# Patient Record
Sex: FEMALE | HISPANIC OR LATINO | Employment: UNEMPLOYED | URBAN - METROPOLITAN AREA
[De-identification: names, ages, dates, MRNs, and addresses within clinical notes are randomized per-mention and may not be internally consistent; named-entity substitution may affect disease eponyms.]

---

## 2017-11-20 ENCOUNTER — GENERIC CONVERSION - ENCOUNTER (OUTPATIENT)
Dept: OTHER | Facility: OTHER | Age: 22
End: 2017-11-20

## 2017-11-26 ENCOUNTER — HOSPITAL ENCOUNTER (EMERGENCY)
Facility: HOSPITAL | Age: 22
Discharge: HOME | End: 2017-11-26
Attending: EMERGENCY MEDICINE | Admitting: EMERGENCY MEDICINE
Payer: COMMERCIAL

## 2017-11-26 VITALS
RESPIRATION RATE: 25 BRPM | HEART RATE: 78 BPM | DIASTOLIC BLOOD PRESSURE: 81 MMHG | SYSTOLIC BLOOD PRESSURE: 117 MMHG | WEIGHT: 162.2 LBS | TEMPERATURE: 98.6 F | OXYGEN SATURATION: 100 %

## 2017-11-26 DIAGNOSIS — Z3A.11 11 WEEKS GESTATION OF PREGNANCY: ICD-10-CM

## 2017-11-26 DIAGNOSIS — O21.9 NAUSEA AND VOMITING IN PREGNANCY: Primary | ICD-10-CM

## 2017-11-26 LAB
ABO GROUP BLD: NORMAL
ALBUMIN SERPL BCP-MCNC: 3.8 G/DL (ref 3.5–5)
ALP SERPL-CCNC: 50 U/L (ref 46–116)
ALT SERPL W P-5'-P-CCNC: 16 U/L (ref 12–78)
ANION GAP SERPL CALCULATED.3IONS-SCNC: 11 MMOL/L (ref 4–13)
AST SERPL W P-5'-P-CCNC: 13 U/L (ref 5–45)
ATRIAL RATE: 74 BPM
B-HCG SERPL-ACNC: ABNORMAL MIU/ML
BASOPHILS # BLD AUTO: 0.01 THOUSANDS/ΜL (ref 0–0.1)
BASOPHILS NFR BLD AUTO: 0 % (ref 0–1)
BILIRUB DIRECT SERPL-MCNC: 0.08 MG/DL (ref 0–0.2)
BILIRUB SERPL-MCNC: 0.32 MG/DL (ref 0.2–1)
BILIRUB UR QL STRIP: NEGATIVE
BLD GP AB SCN SERPL QL: NEGATIVE
BUN SERPL-MCNC: 6 MG/DL (ref 5–25)
CALCIUM SERPL-MCNC: 9.2 MG/DL (ref 8.3–10.1)
CHLORIDE SERPL-SCNC: 103 MMOL/L (ref 100–108)
CLARITY UR: CLEAR
CO2 SERPL-SCNC: 24 MMOL/L (ref 21–32)
COLOR UR: YELLOW
COLOR, POC: YELLOW
CREAT SERPL-MCNC: 0.37 MG/DL (ref 0.6–1.3)
EOSINOPHIL # BLD AUTO: 0.1 THOUSAND/ΜL (ref 0–0.61)
EOSINOPHIL NFR BLD AUTO: 1 % (ref 0–6)
ERYTHROCYTE [DISTWIDTH] IN BLOOD BY AUTOMATED COUNT: 19.7 % (ref 11.6–15.1)
EXT PREG TEST URINE: POSITIVE
GFR SERPL CREATININE-BSD FRML MDRD: 150 ML/MIN/1.73SQ M
GLUCOSE SERPL-MCNC: 81 MG/DL (ref 65–140)
GLUCOSE UR STRIP-MCNC: NEGATIVE MG/DL
HCT VFR BLD AUTO: 29.7 % (ref 34.8–46.1)
HGB BLD-MCNC: 8.9 G/DL (ref 11.5–15.4)
HGB UR QL STRIP.AUTO: NEGATIVE
KETONES UR STRIP-MCNC: ABNORMAL MG/DL
LEUKOCYTE ESTERASE UR QL STRIP: NEGATIVE
LIPASE SERPL-CCNC: 75 U/L (ref 73–393)
LYMPHOCYTES # BLD AUTO: 1.97 THOUSANDS/ΜL (ref 0.6–4.47)
LYMPHOCYTES NFR BLD AUTO: 24 % (ref 14–44)
MCH RBC QN AUTO: 19.8 PG (ref 26.8–34.3)
MCHC RBC AUTO-ENTMCNC: 30 G/DL (ref 31.4–37.4)
MCV RBC AUTO: 66 FL (ref 82–98)
MONOCYTES # BLD AUTO: 0.59 THOUSAND/ΜL (ref 0.17–1.22)
MONOCYTES NFR BLD AUTO: 7 % (ref 4–12)
NEUTROPHILS # BLD AUTO: 5.52 THOUSANDS/ΜL (ref 1.85–7.62)
NEUTS SEG NFR BLD AUTO: 68 % (ref 43–75)
NITRITE UR QL STRIP: NEGATIVE
NRBC BLD AUTO-RTO: 0 /100 WBCS
P AXIS: 38 DEGREES
PH UR STRIP.AUTO: 7 [PH] (ref 4.5–8)
PLATELET # BLD AUTO: 269 THOUSANDS/UL (ref 149–390)
POTASSIUM SERPL-SCNC: 3.4 MMOL/L (ref 3.5–5.3)
PR INTERVAL: 160 MS
PROT SERPL-MCNC: 7.7 G/DL (ref 6.4–8.2)
PROT UR STRIP-MCNC: NEGATIVE MG/DL
QRS AXIS: 60 DEGREES
QRSD INTERVAL: 90 MS
QT INTERVAL: 380 MS
QTC INTERVAL: 421 MS
RBC # BLD AUTO: 4.5 MILLION/UL (ref 3.81–5.12)
RH BLD: POSITIVE
SODIUM SERPL-SCNC: 138 MMOL/L (ref 136–145)
SP GR UR STRIP.AUTO: 1.02 (ref 1–1.03)
SPECIMEN EXPIRATION DATE: NORMAL
T WAVE AXIS: 43 DEGREES
UROBILINOGEN UR QL STRIP.AUTO: 0.2 E.U./DL
VENTRICULAR RATE: 74 BPM
WBC # BLD AUTO: 8.19 THOUSAND/UL (ref 4.31–10.16)

## 2017-11-26 PROCEDURE — 96361 HYDRATE IV INFUSION ADD-ON: CPT

## 2017-11-26 PROCEDURE — 85025 COMPLETE CBC W/AUTO DIFF WBC: CPT | Performed by: FAMILY MEDICINE

## 2017-11-26 PROCEDURE — 81003 URINALYSIS AUTO W/O SCOPE: CPT

## 2017-11-26 PROCEDURE — 99284 EMERGENCY DEPT VISIT MOD MDM: CPT

## 2017-11-26 PROCEDURE — 96374 THER/PROPH/DIAG INJ IV PUSH: CPT

## 2017-11-26 PROCEDURE — 86850 RBC ANTIBODY SCREEN: CPT | Performed by: FAMILY MEDICINE

## 2017-11-26 PROCEDURE — 81025 URINE PREGNANCY TEST: CPT | Performed by: EMERGENCY MEDICINE

## 2017-11-26 PROCEDURE — 93005 ELECTROCARDIOGRAM TRACING: CPT | Performed by: FAMILY MEDICINE

## 2017-11-26 PROCEDURE — 86901 BLOOD TYPING SEROLOGIC RH(D): CPT | Performed by: FAMILY MEDICINE

## 2017-11-26 PROCEDURE — 36415 COLL VENOUS BLD VENIPUNCTURE: CPT | Performed by: FAMILY MEDICINE

## 2017-11-26 PROCEDURE — 83690 ASSAY OF LIPASE: CPT | Performed by: FAMILY MEDICINE

## 2017-11-26 PROCEDURE — 81002 URINALYSIS NONAUTO W/O SCOPE: CPT | Performed by: EMERGENCY MEDICINE

## 2017-11-26 PROCEDURE — 84702 CHORIONIC GONADOTROPIN TEST: CPT | Performed by: FAMILY MEDICINE

## 2017-11-26 PROCEDURE — 86900 BLOOD TYPING SEROLOGIC ABO: CPT | Performed by: FAMILY MEDICINE

## 2017-11-26 PROCEDURE — 80076 HEPATIC FUNCTION PANEL: CPT | Performed by: FAMILY MEDICINE

## 2017-11-26 PROCEDURE — 80048 BASIC METABOLIC PNL TOTAL CA: CPT | Performed by: FAMILY MEDICINE

## 2017-11-26 RX ORDER — ONDANSETRON 2 MG/ML
4 INJECTION INTRAMUSCULAR; INTRAVENOUS ONCE
Status: COMPLETED | OUTPATIENT
Start: 2017-11-26 | End: 2017-11-26

## 2017-11-26 RX ORDER — ONDANSETRON 4 MG/1
4 TABLET, FILM COATED ORAL EVERY 8 HOURS PRN
Qty: 10 TABLET | Refills: 0 | Status: ON HOLD | OUTPATIENT
Start: 2017-11-26 | End: 2018-06-11

## 2017-11-26 RX ORDER — ACETAMINOPHEN 500 MG
1000 TABLET ORAL EVERY 6 HOURS PRN
Status: ON HOLD | COMMUNITY
End: 2018-06-11

## 2017-11-26 RX ORDER — FAMOTIDINE 20 MG
TABLET ORAL
Qty: 30 EACH | Refills: 0 | Status: SHIPPED | OUTPATIENT
Start: 2017-11-26 | End: 2017-11-28

## 2017-11-26 RX ADMIN — SODIUM CHLORIDE 1000 ML: 0.9 INJECTION, SOLUTION INTRAVENOUS at 14:28

## 2017-11-26 RX ADMIN — ONDANSETRON 4 MG: 2 INJECTION INTRAMUSCULAR; INTRAVENOUS at 14:35

## 2017-11-26 NOTE — ED ATTENDING ATTESTATION
Yael Lares MD, saw and evaluated the patient  I have discussed the patient with the resident/non-physician practitioner and agree with the resident's/non-physician practitioner's findings, Plan of Care, and MDM as documented in the resident's/non-physician practitioner's note, except where noted  All available labs and Radiology studies were reviewed  At this point I agree with the current assessment done in the Emergency Department  I have conducted an independent evaluation of this patient a history and physical is as follows:  Patient is 11 weeks pregnant per LMP, , comes with c/o nausea, vomiting, dizziness, going on for several days; denies abdominal pain, vaginal bleeding  PSH:   On exam, no acute distress, VSS, Abd soft, NTNDM [place] CTA, CVA RRR, Neuro intact, PERRL, no CN or focal neuro deficit  We will check labs, to r/o Dehydration, Anemia, check cbc, cmp, lipase, beta HCG  Update:  Labs within normal limits, bedside ultrasound done, IUP confirmed, fetal heart rate documented  Patient stable for discharge, outpatient follow-up with OBGYN      Critical Care Time  CritCare Time

## 2017-11-26 NOTE — ED PROVIDER NOTES
History  Chief Complaint   Patient presents with    Dizziness     pt reports feeling dizzy every morning since she found out she was pregnant on wednesday    LMP 9/10  denies abdominal pain, bleeding or discharge  nausea without vomiting  22 yo F came in with complaints of dizziness and lightheadedness for last 3 weeks when she found out that she was pregnant  Her last LMP was on Sept 10 and she has not visited her OB yet  She says the nausea, dizziness, lightheadedness so bad that she could not even get up today  She also has vomiting with nausea,about 2 episodes daily, nonbilious, no blood  She denies any cough, congestion, fever, ear ache pain abdomen, urinary complaints, constipation, diarrhea, sexually transmitted ds, PID  She is  and has had 2 term deliveries via C section and had to be transfused blood in last pregnancy after C section  She denies smoking, alcohol and last used pot 3 weeks ago        Dizziness   Associated symptoms: nausea and vomiting    Associated symptoms: no blood in stool, no chest pain, no diarrhea, no headaches, no palpitations and no shortness of breath        Prior to Admission Medications   Prescriptions Last Dose Informant Patient Reported? Taking?   acetaminophen (TYLENOL) 500 mg tablet   Yes Yes   Sig: Take 1,000 mg by mouth every 6 (six) hours as needed for mild pain   albuterol (2 5 mg/3 mL) 0 083 % nebulizer solution 2017 at Unknown time  No Yes   Sig: Take 3 mL by nebulization every 6 (six) hours as needed for wheezing      Facility-Administered Medications: None       Past Medical History:   Diagnosis Date    Asthma        Past Surgical History:   Procedure Laterality Date     SECTION         History reviewed  No pertinent family history  I have reviewed and agree with the history as documented      Social History   Substance Use Topics    Smoking status: Never Smoker    Smokeless tobacco: Never Used    Alcohol use No        Review of Systems   Constitutional: Positive for activity change and appetite change  Negative for fatigue, fever and unexpected weight change  HENT: Negative for congestion, sneezing, sore throat, trouble swallowing and voice change  Respiratory: Negative for choking, chest tightness, shortness of breath and wheezing  Cardiovascular: Negative for chest pain, palpitations and leg swelling  Gastrointestinal: Positive for nausea and vomiting  Negative for abdominal distention, abdominal pain, blood in stool, constipation and diarrhea  Genitourinary: Negative for difficulty urinating, vaginal bleeding and vaginal discharge  Neurological: Positive for dizziness and light-headedness  Negative for headaches  Physical Exam  ED Triage Vitals   Temperature Pulse Respirations Blood Pressure SpO2   11/26/17 1217 11/26/17 1217 11/26/17 1217 11/26/17 1217 11/26/17 1217   98 6 °F (37 °C) 82 16 138/68 100 %      Temp Source Heart Rate Source Patient Position - Orthostatic VS BP Location FiO2 (%)   11/26/17 1217 11/26/17 1441 11/26/17 1441 11/26/17 1441 --   Temporal Monitor Lying Right arm       Pain Score       11/26/17 1217       No Pain           Orthostatic Vital Signs  Vitals:    11/26/17 1217 11/26/17 1441 11/26/17 1606   BP: 138/68 152/80 117/81   Pulse: 82 65 78   Patient Position - Orthostatic VS:  Lying Lying       Physical Exam   Constitutional: She is oriented to person, place, and time  She appears well-developed and well-nourished  HENT:   Head: Normocephalic and atraumatic  Lips and tongue dry   Eyes: Conjunctivae and EOM are normal    Neck: Normal range of motion  Cardiovascular: Normal rate, regular rhythm, normal heart sounds and intact distal pulses  Pulmonary/Chest: Effort normal and breath sounds normal    Abdominal: Soft  Neurological: She is alert and oriented to person, place, and time  Skin: Skin is warm and dry         ED Medications  Medications   sodium chloride 0 9 % bolus 1,000 mL (0 mL Intravenous Stopped 11/26/17 1600)   ondansetron (ZOFRAN) injection 4 mg (4 mg Intravenous Given 11/26/17 1435)       Diagnostic Studies  Results Reviewed     Procedure Component Value Units Date/Time    Hepatic function panel [[id number]]  (Normal) Collected:  11/26/17 1427    Lab Status:  Final result Specimen:  Blood from Arm, Left Updated:  11/26/17 1521     Total Bilirubin 0 32 mg/dL      Bilirubin, Direct 0 08 mg/dL      Alkaline Phosphatase 50 U/L      AST 13 U/L      ALT 16 U/L      Total Protein 7 7 g/dL      Albumin 3 8 g/dL     Lipase [[id number]]  (Normal) Collected:  11/26/17 1427    Lab Status:  Final result Specimen:  Blood from Arm, Left Updated:  11/26/17 1521     Lipase 75 u/L     hCG, quantitative [[id number]]  (Abnormal) Collected:  11/26/17 1427    Lab Status:  Final result Specimen:  Blood from Arm, Left Updated:  11/26/17 1521     HCG, Quant 111,165 9 (H) mIU/mL     Narrative:          Expected Ranges:     Approximate               Approximate HCG  Gestation age          Concentration ( mIU/mL)  _____________          ______________________   Aundra Dally                      HCG values  0 2-1                       5-50  1-2                           2-3                         100-5000  3-4                         500-72377  4-5                         1000-32222  5-6                         [id number]  6-8                         87260-169298  8-12                        [id number]    Basic metabolic panel [[id number]]  (Abnormal) Collected:  11/26/17 1427    Lab Status:  Final result Specimen:  Blood from Arm, Left Updated:  11/26/17 1454     Sodium 138 mmol/L      Potassium 3 4 (L) mmol/L      Chloride 103 mmol/L      CO2 24 mmol/L      Anion Gap 11 mmol/L      BUN 6 mg/dL      Creatinine 0 37 (L) mg/dL      Glucose 81 mg/dL      Calcium 9 2 mg/dL      eGFR 150 ml/min/1 73sq m     Narrative:         National Kidney Disease Education Program recommendations are as follows:  GFR calculation is accurate only with a steady state creatinine  Chronic Kidney disease less than 60 ml/min/1 73 sq  meters  Kidney failure less than 15 ml/min/1 73 sq  meters      CBC and differential [[id number]]  (Abnormal) Collected:  11/26/17 1427    Lab Status:  Final result Specimen:  Blood from Arm, Left Updated:  11/26/17 1434     WBC 8 19 Thousand/uL      RBC 4 50 Million/uL      Hemoglobin 8 9 (L) g/dL      Hematocrit 29 7 (L) %      MCV 66 (L) fL      MCH 19 8 (L) pg      MCHC 30 0 (L) g/dL      RDW 19 7 (H) %      Platelets 007 Thousands/uL      nRBC 0 /100 WBCs      Neutrophils Relative 68 %      Lymphocytes Relative 24 %      Monocytes Relative 7 %      Eosinophils Relative 1 %      Basophils Relative 0 %      Neutrophils Absolute 5 52 Thousands/µL      Lymphocytes Absolute 1 97 Thousands/µL      Monocytes Absolute 0 59 Thousand/µL      Eosinophils Absolute 0 10 Thousand/µL      Basophils Absolute 0 01 Thousands/µL     POCT urinalysis dipstick [[id number]]  (Normal) Resulted:  11/26/17 1255    Lab Status:  Final result Updated:  11/26/17 1256     Color, UA yellow    POCT pregnancy, urine [[id number]]  (Normal) Resulted:  11/26/17 1255    Lab Status:  Final result Updated:  11/26/17 1255     EXT PREG TEST UR (Ref: Negative) positive    ED Urine Macroscopic [[id number]]  (Abnormal) Collected:  11/26/17 1310    Lab Status:  Final result Specimen:  Urine Updated:  11/26/17 1254     Color, UA Yellow     Clarity, UA Clear     pH, UA 7 0     Leukocytes, UA Negative     Nitrite, UA Negative     Protein, UA Negative mg/dl      Glucose, UA Negative mg/dl      Ketones, UA 15 (1+) (A) mg/dl      Urobilinogen, UA 0 2 E U /dl      Bilirubin, UA Negative     Blood, UA Negative     Specific Gravity, UA 1 020    Narrative:       CLINITEK RESULT                 No orders to display         Procedures  ECG 12 Lead Documentation  Date/Time: 11/26/2017 2:41 PM  Performed by: Nathanael Escalante  Authorized by: Mirna Rojo     ECG reviewed by me, the ED Provider: yes    Patient location:  ED  Previous ECG:     Previous ECG:  Compared to current  Rhythm:     Rhythm: sinus rhythm    Ectopy:     Ectopy: PVCs      PVCs:  Infrequent  Conduction:     Conduction: normal    ST segments:     ST segments:  Normal  Pelvic Ultrasound  Performed by: Ciarra Feliciano by: Lolly Lugo     Procedure date/time:  11/26/2017 2:42 PM  Patient location:  Bedside and ED  Procedure details:     Indications: evaluate for IUP      Assess:  Intrauterine pregnancy    Technique:  Transabdominal obstetric (limited) exam  Uterine findings:     Intrauterine pregnancy: identified      Single gestation: identified      Fetal heart rate: identified      Fetal heart rate (bpm):  160          Phone Consults  ED Phone Contact    ED Course  ED Course                                MDM  Number of Diagnoses or Management Options  11 weeks gestation of pregnancy:   Nausea and vomiting in pregnancy:     CritCare Time    Disposition  Final diagnoses:   11 weeks gestation of pregnancy   Nausea and vomiting in pregnancy     Time reflects when diagnosis was documented in both MDM as applicable and the Disposition within this note     Time User Action Codes Description Comment    11/26/2017  4:00 PM Marleny Oddi Add [Z3A 11] 11 weeks gestation of pregnancy     11/26/2017  4:00 PM Marleny Oddi Add [O21 9] Nausea and vomiting in pregnancy     11/26/2017  4:00 PM Marleny Oddi Modify [Z3A 11] 11 weeks gestation of pregnancy     11/26/2017  4:00 PM Marleny Oddi Modify [O21 9] Nausea and vomiting in pregnancy     11/26/2017  4:00 PM Marleny Oddi Modify [Z3A 11] 11 weeks gestation of pregnancy     11/26/2017  4:00 PM Marleny Oddi Modify [O21 9] Nausea and vomiting in pregnancy       ED Disposition     ED Disposition Condition Comment    Discharge  [address] discharge to home/self care      Condition at discharge: Good        Follow-up Information     Follow up With Specialties Details Why [address] [facility] [address] [facility] OB/GYN Obstetrics and Gynecology Schedule an appointment as soon as possible for a visit in 1 week  [address] [facility] [address] [phone]        Patient's Medications   Discharge Prescriptions    ONDANSETRON (ZOFRAN) 4 MG TABLET    Take 1 tablet by mouth every 8 (eight) hours as needed for nausea or vomiting       Start Date: 11/26/2017End Date: --       Order Dose: 4 mg       Quantity: 10 tablet    Refills: 0    PRENATAL VIT-FE FUMARATE-FA (PRENATAL COMPLETE) 14-0 4 MG TABS    Take once a day       Start Date: 11/26/2017End Date: --       Order Dose: --       Quantity: 30 each    Refills: 0     No discharge procedures on file  ED Provider  Attending physically available and evaluated Josseline Guzman I managed the patient along with the ED Attending      Electronically Signed by         Diana Rowland MD  Resident  11/26/17 8738

## 2017-11-26 NOTE — DISCHARGE INSTRUCTIONS
Nausea and Vomiting in Pregnancy   AMBULATORY CARE:   Nausea and vomiting in pregnancy  can happen any time of day  These symptoms usually start before the 9th week of pregnancy, and end by the 14th week (second trimester)  Some women can have nausea and vomiting for a longer time  These symptoms can affect some women throughout the entire pregnancy  Nausea and vomiting do not harm your baby  These symptoms can make it hard for you to do your daily activities  Seek care immediately if:   · You have signs of dehydration  Examples are dark yellow urine, dry mouth and lips, dry skin, fast heartbeat, and urinating less than usual     · You have severe abdominal pain  · You feel too weak or dizzy to stand up  · You see blood in your vomit or bowel movements  Contact your healthcare provider if:   · You vomit more than 4 times in 1 day  · You have not been able to keep liquids down for more than 1 day  · You lose more than 2 pounds  · You have a fever  · Your nausea and vomiting continue longer than 14 weeks  · You have questions or concerns about your condition or care  Treatment  for nausea and vomiting in pregnancy is usually not needed  You can make changes in the foods you eat and in your activities to help manage your symptoms  You may need to try several things to learn what works for you  Talk to your healthcare provider if your symptoms do not decrease with the changes suggested below  You may need vitamin B6 and medicine if these changes do not help, or your symptoms become severe  Nutrition changes you can make to manage nausea and vomiting:   · Eat small meals throughout the day instead of 3 large meals  You may be more likely to have nausea and vomiting when your stomach is empty  Eat foods that are low in fat and high in protein  Examples are lean meat, beans, turkey, and chicken without the skin   Eat a small snack, such as crackers, dry cereal, or a small sandwich before you go to bed  · Eat some crackers or dry toast before you get out of bed in the morning  Get out of bed slowly  Sudden movements could cause you to get dizzy and nauseated  · Eat bland foods when you feel nauseated  Examples of bland foods include dry toast, dry cereal, plain pasta, white rice, and bread  Other bland foods include saltine crackers, bananas, gelatin, and pretzels  Avoid spicy, greasy, and fried foods  Avoid any other foods that make you feel nauseated  · Drink liquids that contain ginger  Drink ginger ale made with real ginger or ginger tea made with fresh grated ginger  Ginger capsules or ginger candies may also help to decrease nausea and vomiting  · Drink liquids between meals instead of with meals  Wait at least 30 minutes after you eat to drink liquids  Drink small amounts of liquids often throughout the day to prevent dehydration  Ask how much liquid you should drink each day  Other changes you can make to manage nausea and vomiting:   · Avoid smells that bother you  Strong odors may cause nausea and vomiting to start, or make it worse  Take a short walk, turn on a fan, or try to sleep with the window open to get fresh air  When you are cooking, open windows to get rid of smells that may cause nausea  · Do not brush your teeth right after you eat  if it makes you nauseated  · Rest when you need to  Start activity slowly and work up to your usual routine as you start to feel better  · Talk to your healthcare provider about your prenatal vitamins  Prenatal vitamins can cause nausea for some women  Try taking your prenatal vitamin at night or with a snack  If this change does not help, your healthcare provider may recommend a different type of vitamin  · Do not use any medicines, vitamins, or supplements to manage your symptoms without asking your healthcare provider  Many medicines can harm an unborn baby       · Light to moderate exercise  may help to decrease your symptoms  It may also help you to sleep better at night  Ask your healthcare provider about the best exercise plan for you  Follow up with your healthcare provider as directed:  Write down your questions so you remember to ask them during your visits  © 2017 [facility] Information is for End User's use only and may not be sold, redistributed or otherwise used for commercial purposes  All illustrations and images included in CareNotes® are the copyrighted property of A D A M [employer]  or Jesse Turner  The above information is an  only  It is not intended as medical advice for individual conditions or treatments  Talk to your doctor, nurse or pharmacist before following any medical regimen to see if it is safe and effective for you  Pregnancy at 11 to 14 Weeks   AMBULATORY CARE:   What changes are happening to your body: You are now at the end of your first trimester and entering your second trimester  Morning sickness usually goes away by this time  You may have other symptoms such as fatigue, frequent urination, and headaches  You may have gained between 2 to 4 pounds by now  Seek care immediately if:   · You have pain or cramping in your abdomen or low back  · You have heavy vaginal bleeding or clotting  · You pass material that looks like tissue or large clots  Collect the material and bring it with you  Contact your healthcare provider if:   · You cannot keep food or drinks down, and you are losing weight  · You have light bleeding  · You have chills or a fever  · You have vaginal itching, burning, or pain  · You have yellow, green, white, or foul-smelling vaginal discharge  · You have pain or burning when you urinate, less urine than usual, or pink or bloody urine  · You have questions or concerns about your condition or care    How to care for yourself at this stage of your pregnancy:   · Get plenty of rest   You may feel more tired than normal  You may need to take naps or go to bed earlier  · Manage nausea and vomiting  Avoid fatty and spicy foods  Eat small meals throughout the day instead of large meals  Mel may help to decrease nausea  Ask your healthcare provider about other ways of decreasing nausea and vomiting  · Eat a variety of healthy foods  Healthy foods include fruits, vegetables, whole-grain breads, low-fat dairy foods, beans, lean meats, and fish  Drink liquids as directed  Ask how much liquid to drink each day and which liquids are best for you  Limit caffeine to less than 200 milligrams each day  Limit your intake of fish to 2 servings each week  Choose fish low in mercury such as canned light tuna, shrimp, salmon, cod, or tilapia  Do not  eat fish high in mercury such as swordfish, tilefish, cornelio mackerel, and shark  · Take prenatal vitamins as directed  Your need for certain vitamins and minerals, such as folic acid, increases during pregnancy  Prenatal vitamins provide some of the extra vitamins and minerals you need  Prenatal vitamins may also help to decrease the risk of certain birth defects  · Do not smoke  If you smoke, it is never too late to quit  Smoking increases your risk of a miscarriage and other health problems during your pregnancy  Smoking can cause your baby to be born too early or weigh less at birth  Ask your healthcare provider for information if you need help quitting  · Do not drink alcohol  Alcohol passes from your body to your baby through the placenta  It can affect your baby's brain development and cause fetal alcohol syndrome (FAS)  FAS is a group of conditions that causes mental, behavior, and growth problems  · Talk to your healthcare provider before you take any medicines  Many medicines may harm your baby if you take them when you are pregnant  Do not take any medicines, vitamins, herbs, or supplements without first talking to your healthcare provider   Never use illegal or street drugs (such as marijuana or cocaine) while you are pregnant  Safety tips during pregnancy:   · Avoid hot tubs and saunas  Do not use a hot tub or sauna while you are pregnant, especially during your first trimester  Hot tubs and saunas may raise your baby's temperature and increase the risk of birth defects  · Avoid toxoplasmosis  This is an infection caused by eating raw meat or being around infected cat feces  It can cause birth defects, miscarriages, and other problems  Wash your hands after you touch raw meat  Make sure any meat is well-cooked before you eat it  Avoid raw eggs and unpasteurized milk  Use gloves or ask someone else to clean your cat's litter box while you are pregnant  Changes that are happening with your baby: Your baby has fully formed fingernails and toenails  Your baby's heartbeat can now be heard  Ask your healthcare provider if you can listen to your baby's heartbeat  By week 14, your baby is over 4 inches long from the top of the head to the rump (baby's bottom)  Your baby weighs over 3 ounces  What you need to know about prenatal care:  During the first 28 weeks of your pregnancy, you will see your healthcare provider once a month  Prenatal care can help prevent problems during pregnancy and childbirth  Your healthcare provider will check your blood pressure and weight  You may also need any of the following:  · A urine test  may also be done to check for sugar and protein  These can be signs of gestational diabetes or infection  · Genetic disorders screening tests  may be offered to you  This screening test checks your baby's risk of genetic disorders such as Down syndrome  The screening test includes a blood test and ultrasound  · Your baby's heart rate  will be checked  © 2017 [address] Information is for End User's use only and may not be sold, redistributed or otherwise used for commercial purposes   All illustrations and images included in [employer] are the copyrighted property of A D A M , Inc  or Jesse Turner  The above information is an  only  It is not intended as medical advice for individual conditions or treatments  Talk to your doctor, nurse or pharmacist before following any medical regimen to see if it is safe and effective for you

## 2017-11-28 ENCOUNTER — HOSPITAL ENCOUNTER (EMERGENCY)
Facility: HOSPITAL | Age: 22
Discharge: HOME | End: 2017-11-28
Attending: EMERGENCY MEDICINE | Admitting: EMERGENCY MEDICINE
Payer: COMMERCIAL

## 2017-11-28 VITALS
RESPIRATION RATE: 16 BRPM | WEIGHT: 161.44 LBS | OXYGEN SATURATION: 100 % | SYSTOLIC BLOOD PRESSURE: 125 MMHG | TEMPERATURE: 98.9 F | DIASTOLIC BLOOD PRESSURE: 60 MMHG | HEART RATE: 95 BPM

## 2017-11-28 DIAGNOSIS — Z76.0 MEDICATION REFILL: ICD-10-CM

## 2017-11-28 DIAGNOSIS — Z34.90 PREGNANT: Primary | ICD-10-CM

## 2017-11-28 LAB
ALBUMIN SERPL BCP-MCNC: 3.6 G/DL (ref 3.5–5)
ALP SERPL-CCNC: 48 U/L (ref 46–116)
ALT SERPL W P-5'-P-CCNC: 17 U/L (ref 12–78)
ANION GAP SERPL CALCULATED.3IONS-SCNC: 7 MMOL/L (ref 4–13)
AST SERPL W P-5'-P-CCNC: 12 U/L (ref 5–45)
BASOPHILS # BLD AUTO: 0.01 THOUSANDS/ΜL (ref 0–0.1)
BASOPHILS NFR BLD AUTO: 0 % (ref 0–1)
BILIRUB SERPL-MCNC: 0.33 MG/DL (ref 0.2–1)
BUN SERPL-MCNC: 6 MG/DL (ref 5–25)
CALCIUM SERPL-MCNC: 8.9 MG/DL (ref 8.3–10.1)
CHLORIDE SERPL-SCNC: 104 MMOL/L (ref 100–108)
CO2 SERPL-SCNC: 25 MMOL/L (ref 21–32)
CREAT SERPL-MCNC: 0.4 MG/DL (ref 0.6–1.3)
EOSINOPHIL # BLD AUTO: 0.12 THOUSAND/ΜL (ref 0–0.61)
EOSINOPHIL NFR BLD AUTO: 2 % (ref 0–6)
ERYTHROCYTE [DISTWIDTH] IN BLOOD BY AUTOMATED COUNT: 19.7 % (ref 11.6–15.1)
GFR SERPL CREATININE-BSD FRML MDRD: 146 ML/MIN/1.73SQ M
GLUCOSE SERPL-MCNC: 101 MG/DL (ref 65–140)
HCT VFR BLD AUTO: 28 % (ref 34.8–46.1)
HGB BLD-MCNC: 8.4 G/DL (ref 11.5–15.4)
LYMPHOCYTES # BLD AUTO: 1.77 THOUSANDS/ΜL (ref 0.6–4.47)
LYMPHOCYTES NFR BLD AUTO: 23 % (ref 14–44)
MCH RBC QN AUTO: 19.9 PG (ref 26.8–34.3)
MCHC RBC AUTO-ENTMCNC: 30 G/DL (ref 31.4–37.4)
MCV RBC AUTO: 66 FL (ref 82–98)
MONOCYTES # BLD AUTO: 0.55 THOUSAND/ΜL (ref 0.17–1.22)
MONOCYTES NFR BLD AUTO: 7 % (ref 4–12)
NEUTROPHILS # BLD AUTO: 5.16 THOUSANDS/ΜL (ref 1.85–7.62)
NEUTS SEG NFR BLD AUTO: 68 % (ref 43–75)
NRBC BLD AUTO-RTO: 0 /100 WBCS
PLATELET # BLD AUTO: 248 THOUSANDS/UL (ref 149–390)
POTASSIUM SERPL-SCNC: 3.5 MMOL/L (ref 3.5–5.3)
PROT SERPL-MCNC: 7.3 G/DL (ref 6.4–8.2)
RBC # BLD AUTO: 4.23 MILLION/UL (ref 3.81–5.12)
SODIUM SERPL-SCNC: 136 MMOL/L (ref 136–145)
WBC # BLD AUTO: 7.61 THOUSAND/UL (ref 4.31–10.16)

## 2017-11-28 PROCEDURE — 85025 COMPLETE CBC W/AUTO DIFF WBC: CPT | Performed by: EMERGENCY MEDICINE

## 2017-11-28 PROCEDURE — 99284 EMERGENCY DEPT VISIT MOD MDM: CPT

## 2017-11-28 PROCEDURE — 36415 COLL VENOUS BLD VENIPUNCTURE: CPT

## 2017-11-28 PROCEDURE — 80053 COMPREHEN METABOLIC PANEL: CPT | Performed by: EMERGENCY MEDICINE

## 2017-11-28 RX ORDER — VITAMIN A, VITAMIN C, VITAMIN D-3, VITAMIN E, VITAMIN B-1, VITAMIN B-2, NIACIN, VITAMIN B-6, CALCIUM, IRON, ZINC, COPPER 4000; 120; 400; 22; 1.84; 3; 20; 10; 1; 12; 200; 27; 25; 2 [IU]/1; MG/1; [IU]/1; MG/1; MG/1; MG/1; MG/1; MG/1; MG/1; UG/1; MG/1; MG/1; MG/1; MG/1
1 TABLET ORAL DAILY
Qty: 30 TABLET | Refills: 0 | Status: SHIPPED | OUTPATIENT
Start: 2017-11-28 | End: 2018-07-17 | Stop reason: ALTCHOICE

## 2017-11-28 RX ORDER — PNV NO.95/FERROUS FUM/FOLIC AC 28MG-0.8MG
1 TABLET ORAL DAILY
Qty: 20 TABLET | Refills: 0 | Status: SHIPPED | OUTPATIENT
Start: 2017-11-28 | End: 2017-11-28

## 2017-11-28 NOTE — ED NOTES
Pt up at registration window wanting to speak with charge nurse  Patient and patient's mother asking why she is still waiting after they have seen multiple people that have arrived after her, get called back  Patient and her mother told that the sickest patients get pulled back first and that we are trying to work as quickly as we can to get people seen by the doctors  Patient reporting she was here two days ago and was given a script that "doesnt exist " Asked if she can just be given a new script  Delilah Cheung with [facility] to have her seen in fast track       Jon Rose RN  11/28/17 1659

## 2017-11-28 NOTE — DISCHARGE INSTRUCTIONS
Pregnancy   WHAT YOU NEED TO KNOW:   A normal pregnancy lasts about 40 weeks  The first trimester lasts from your last period through the 12th week of pregnancy  The second trimester lasts from the 13th week of your pregnancy through the 23rd week  The third trimester lasts from your 24th week of pregnancy until your baby is born  If you know the date of your last period, your healthcare provider can estimate your due date  You may give birth to your baby any time from 37 weeks to 2 weeks after your due date  DISCHARGE INSTRUCTIONS:   Return to the emergency department if:   · You develop a severe headache that does not go away  · You have new or increased vision changes, such as blurred or spotted vision  · You have new or increased swelling in your face or hands  · You have pain or cramping in your abdomen or low back  · You have vaginal bleeding  Contact your healthcare provider or obstetrician if:   · You have abdominal cramps, pressure, or tightening  · You have a change in vaginal discharge  · You cannot keep food or drinks down, and you are losing weight  · You have chills or a fever  · You have vaginal itching, burning, or pain  · You have yellow, green, white, or foul-smelling vaginal discharge  · You have pain or burning when you urinate, less urine than usual, or pink or bloody urine  · You have questions or concerns about your condition or care  Medicines:   · Prenatal vitamins  provide some of the extra vitamins and minerals you need during pregnancy  Prenatal vitamins may also help to decrease the risk of certain birth defects  · Take your medicine as directed  Contact your healthcare provider if you think your medicine is not helping or if you have side effects  Tell him or her if you are allergic to any medicine  Keep a list of the medicines, vitamins, and herbs you take  Include the amounts, and when and why you take them   Bring the list or the pill bottles to follow-up visits  Carry your medicine list with you in case of an emergency  Follow up with your healthcare provider or obstetrician as directed:  Go to all of your prenatal visits during your pregnancy  Write down your questions so you remember to ask them during your visits  Body changes that may occur during your pregnancy:   · Breast changes  you will experience include tenderness and tingling during the early part of your pregnancy  Your breasts will become larger  You may need to use a support bra  You may see a thin, yellow fluid, called colostrum, leak from your nipples during the second trimester  Colostrum is a liquid that changes to milk about 3 days after you give birth  · Skin changes and stretch marks  may occur during your pregnancy  You may have red marks, called stretch marks, on your skin  Stretch marks will usually fade after pregnancy  Use lotion if your skin is dry and itchy  The skin on your face, around your nipples, and below your belly button may darken  Most of the time, your skin will return to its normal color after your baby is born  · Morning sickness  is nausea and vomiting that can happen at any time of day  Avoid fatty and spicy foods  Eat small meals throughout the day instead of large meals  Mel may help to decrease nausea  Ask your healthcare provider about other ways of decreasing nausea and vomiting  · Heartburn  may be caused by changes in your hormones during pregnancy  Your growing uterus may also push your stomach upward and force stomach acid to back up into your esophagus  Eat 4 or 5 small meals each day instead of large meals  Avoid spicy foods  Avoid eating right before bedtime  · Constipation  may develop during your pregnancy  To treat constipation, eat foods high in fiber such as fiber cereals, beans, fruits, vegetables, whole-grain breads, and prune juice  Get regular exercise and drink plenty of water   Your healthcare provider may also suggest a fiber supplement to soften your bowel movements  Talk to your healthcare provider before you use any medicines to decrease constipation  · Hemorrhoids  are enlarged veins in the rectal area  They may cause pain, itching, and bright red bleeding from your rectum  To decrease your risk of hemorrhoids, prevent constipation and do not strain to have a bowel movement  If you have hemorrhoids, soak in a tub of warm water to ease discomfort  Ask your healthcare provider how you can treat hemorrhoids  · Leg cramps and swelling  may be caused by low calcium levels or the added weight of pregnancy  Raise your legs above the level of your heart to decrease swelling  During a leg cramp, stretch or massage the muscle that has the cramp  Heat may help decrease pain and muscle spasms  Apply heat on your muscle for 20 to 30 minutes every 2 hours for as many days as directed  · Back pain  may occur as your baby grows  Do not stand for long periods of time or lift heavy items  Use good posture while you stand, squat, or bend  Wear low-heeled shoes with good support  Rest may also help to relieve back pain  Ask your healthcare provider about exercises you can do to strengthen your back muscles  Stay healthy during your pregnancy:   · Eat a variety of healthy foods  Healthy foods include fruits, vegetables, whole-grain breads, low-fat dairy foods, beans, lean meats, and fish  Drink liquids as directed  Ask how much liquid to drink each day and which liquids are best for you  Limit caffeine to less than 200 milligrams each day  Limit your intake of fish to 2 servings each week  Choose fish low in mercury such as canned light tuna, shrimp, crab, salmon, cod, or tilapia  Do not  eat fish high in mercury such as swordfish, tilefish, cornelio mackerel, and shark  · Take prenatal vitamins as directed  Your need for certain vitamins and minerals, such as folic acid, increases during pregnancy   Prenatal vitamins provide some of the extra vitamins and minerals you need  Prenatal vitamins may also help to decrease the risk of certain birth defects  · Ask how much weight you should gain during your pregnancy  Too much or too little weight gain can be unhealthy for you and your baby  · Talk to your healthcare provider about exercise  Moderate exercise can help you stay fit  Your healthcare provider will help you plan an exercise program that is safe for you during pregnancy  · Do not smoke  If you smoke, it is never too late to quit  Smoking increases your risk of a miscarriage and other health problems during your pregnancy  Smoking can cause your baby to be born too early or weigh less at birth  Ask your healthcare provider for information if you need help quitting  · Do not drink alcohol  Alcohol passes from your body to your baby through the placenta  It can affect your baby's brain development and cause fetal alcohol syndrome (FAS)  FAS is a group of conditions that causes mental, behavior, and growth problems  · Talk to your healthcare provider before you take any medicines  Many medicines may harm your baby if you take them when you are pregnant  Do not take any medicines, vitamins, herbs, or supplements without first talking to your healthcare provider  Never use illegal or street drugs (such as marijuana or cocaine) while you are pregnant  Safety tips:   · Avoid hot tubs and saunas  Do not use a hot tub or sauna while you are pregnant, especially during your first trimester  Hot tubs and saunas may raise your baby's temperature and increase the risk of birth defects  · Avoid toxoplasmosis  This is an infection caused by eating raw meat or being around infected cat feces  It can cause birth defects, miscarriages, and other problems  Wash your hands after you touch raw meat  Make sure any meat is well-cooked before you eat it  Avoid raw eggs and unpasteurized milk   Use gloves or ask someone else to clean your cat's litter box while you are pregnant  · Ask your healthcare provider about travel  The most comfortable time to travel is during the second trimester  Ask your healthcare provider if you can travel after 36 weeks  You may not be able to travel in an airplane after 36 weeks  He may also recommend that you avoid long road trips  © 2017 [employer] Information is for End User's use only and may not be sold, redistributed or otherwise used for commercial purposes  All illustrations and images included in CareNotes® are the copyrighted property of [employer]  or Jesse Turner  The above information is an  only  It is not intended as medical advice for individual conditions or treatments  Talk to your doctor, nurse or pharmacist before following any medical regimen to see if it is safe and effective for you

## 2017-11-28 NOTE — ED PROVIDER NOTES
History  Chief Complaint   Patient presents with    Dizziness     Dizziness when standing for 3 weeks  Seen here 2 days ago for same  Pregnant- 11 weeks  Has OB appt tomorrow  This is a 78-year-old female patient who is  2 para 1 was seen here 2 days ago  She does have chronic dizziness when she stands her hemoglobin today is improved  She actually came in today because the prenatal vitamins that she was given at her last visit did not exist   She has a follow-up with OB tomorrow she has no abdominal pain no vaginal bleeding no back pain no fluid gush from her vagina  Really has no complaints just came in for prescription  It did appear that her hemoglobin improved from her last visit and she is chronically anemic  Patient be given a prescription and discharged home  Dizziness       Prior to Admission Medications   Prescriptions Last Dose Informant Patient Reported? Taking? Prenatal Vit-Fe Fumarate-FA (PRENATAL COMPLETE) 14-0 4 MG TABS   No No   Sig: Take once a day   acetaminophen (TYLENOL) 500 mg tablet   Yes No   Sig: Take 1,000 mg by mouth every 6 (six) hours as needed for mild pain   ondansetron (ZOFRAN) 4 mg tablet   No No   Sig: Take 1 tablet by mouth every 8 (eight) hours as needed for nausea or vomiting      Facility-Administered Medications: None       Past Medical History:   Diagnosis Date    Asthma        Past Surgical History:   Procedure Laterality Date     SECTION         History reviewed  No pertinent family history  I have reviewed and agree with the history as documented  Social History   Substance Use Topics    Smoking status: Never Smoker    Smokeless tobacco: Never Used    Alcohol use No        Review of Systems   Neurological: Positive for dizziness  All other systems reviewed and are negative        Physical Exam  ED Triage Vitals [17 1203]   Temperature Pulse Respirations Blood Pressure SpO2   98 9 °F (37 2 °C) 95 16 125/60 100 % Temp Source Heart Rate Source Patient Position - Orthostatic VS BP Location FiO2 (%)   Oral -- -- -- --      Pain Score       No Pain           Orthostatic Vital Signs  Vitals:    11/28/17 1203   BP: 125/60   Pulse: 95       Physical Exam   Constitutional: She appears well-developed and well-nourished  HENT:   Head: Normocephalic and atraumatic  Right Ear: External ear normal    Left Ear: External ear normal    Nose: Nose normal    Mouth/Throat: Oropharynx is clear and moist    Eyes: Conjunctivae are normal  Pupils are equal, round, and reactive to light  Neck: Normal range of motion  Neck supple  Cardiovascular: Normal rate and regular rhythm  Pulmonary/Chest: Effort normal and breath sounds normal    Abdominal: Soft  Bowel sounds are normal  There is no tenderness  Neurological: She is alert  Skin: Skin is warm  Psychiatric: She has a normal mood and affect  Her behavior is normal    Nursing note and vitals reviewed  ED Medications  Medications - No data to display    Diagnostic Studies  Results Reviewed     Procedure Component Value Units Date/Time    Comprehensive metabolic panel [[id number]]  (Abnormal) Collected:  11/28/17 1219    Lab Status:  Final result Specimen:  Blood from Arm, Right Updated:  11/28/17 1237     Sodium 136 mmol/L      Potassium 3 5 mmol/L      Chloride 104 mmol/L      CO2 25 mmol/L      Anion Gap 7 mmol/L      BUN 6 mg/dL      Creatinine 0 40 (L) mg/dL      Glucose 101 mg/dL      Calcium 8 9 mg/dL      AST 12 U/L      ALT 17 U/L      Alkaline Phosphatase 48 U/L      Total Protein 7 3 g/dL      Albumin 3 6 g/dL      Total Bilirubin 0 33 mg/dL      eGFR 146 ml/min/1 73sq m     Narrative:         National Kidney Disease Education Program recommendations are as follows:  GFR calculation is accurate only with a steady state creatinine  Chronic Kidney disease less than 60 ml/min/1 73 sq  meters  Kidney failure less than 15 ml/min/1 73 sq  meters      CBC and differential [[id number]]  (Abnormal) Collected:  11/28/17 1219    Lab Status:  Final result Specimen:  Blood from Arm, Right Updated:  11/28/17 1225     WBC 7 61 Thousand/uL      RBC 4 23 Million/uL      Hemoglobin 8 4 (L) g/dL      Hematocrit 28 0 (L) %      MCV 66 (L) fL      MCH 19 9 (L) pg      MCHC 30 0 (L) g/dL      RDW 19 7 (H) %      Platelets 072 Thousands/uL      nRBC 0 /100 WBCs      Neutrophils Relative 68 %      Lymphocytes Relative 23 %      Monocytes Relative 7 %      Eosinophils Relative 2 %      Basophils Relative 0 %      Neutrophils Absolute 5 16 Thousands/µL      Lymphocytes Absolute 1 77 Thousands/µL      Monocytes Absolute 0 55 Thousand/µL      Eosinophils Absolute 0 12 Thousand/µL      Basophils Absolute 0 01 Thousands/µL                  No orders to display              Procedures  Procedures       Phone Contacts  ED Phone Contact    ED Course  ED Course                                MDM  CritCare Time    Disposition  Final diagnoses:   Pregnant   Medication refill     Time reflects when diagnosis was documented in both MDM as applicable and the Disposition within this note     Time User Action Codes Description Comment    11/28/2017  2:23 PM [facility], [facility] [B18 57] Pregnant     11/28/2017  2:23 PM [place], [facility] Add [Z76 0] Medication refill       ED Disposition     ED Disposition Condition Comment    Discharge  [address] discharge to home/self care  Condition at discharge: Good        Follow-up Information     Follow up With Specialties Details Why Contact Info       Follow-up with the OBGYN tomorrow as planned         Patient's Medications   Discharge Prescriptions    PRENATAL VIT-FE FUMARATE-FA (PRENATAL VITAMIN PLUS LOW IRON) 27-1 MG TABS    Take 1 tablet by mouth daily       Start Date: 11/28/2017End Date: --       Order Dose: 1 tablet       Quantity: 30 tablet    Refills: 0     No discharge procedures on file      ED Provider  Electronically Signed by           Zuleima Gonzalez Alvaro Alcala PA-C  11/28/17 0941

## 2017-11-29 ENCOUNTER — APPOINTMENT (OUTPATIENT)
Dept: PERINATAL CARE | Facility: OTHER | Age: 22
End: 2017-11-29
Payer: COMMERCIAL

## 2017-11-29 ENCOUNTER — GENERIC CONVERSION - ENCOUNTER (OUTPATIENT)
Dept: OTHER | Facility: OTHER | Age: 22
End: 2017-11-29

## 2017-11-29 PROCEDURE — 76801 OB US < 14 WKS SINGLE FETUS: CPT | Performed by: OBSTETRICS & GYNECOLOGY

## 2018-01-16 NOTE — PROGRESS NOTES
2017         RE: Nicole Smith                                To: [address]   MR#: [id number]                                    [address]   : [address] [facility], [address]   SS#: *********                                    Fax: [phone]   ENC: [id number]   (Exam #: [id number])      The LMP of this 23year old,  G3, P2-0-0-2 patient was SEP 10 2017, giving   her an IDA of 2018 and a current gestational age of 5 weeks 3 days   by dates  A sonographic examination was performed on 2017 using   real time equipment  The ultrasound examination was performed using   abdominal technique  The patient has a BMI of 30 0  Her blood pressure   today was 133/85  Earliest US on record: [facility] US 17  10W6D 18  IDA      Thank you very much for referring this very nice patient for a dating   ultrasound  This is her third pregnancy  She has a history of 2 previous    sections  She states that she developed hypertension towards the   end of her pregnancies  She states that she had a blood transfusion just   prior to her last  delivery  She denies the current use of   tobacco, alcohol, or drugs  She currently takes prenatal vitamins and has   no known drug allergies  Her family medical history is unremarkable  A   review of systems is otherwise negative  On exam, the patient appears   well, in no acute distress, and her abdomen is nontender  Multiple longitudinal and transverse sections revealed a paige   intrauterine pregnancy with the fetus in breech presentation  The placenta   is anterior in implantation        Cardiac motion was observed at 156 bpm       INDICATIONS      confirm gestational age   Previous C/S x 2   obesity      Exam Types      Level I      RESULTS      Fetus # 1 of 1   Breech presentation      MEASUREMENTS (* Included In Average GA)      CRL              4 1 cm 10 weeks 6 days*      THE AVERAGE GESTATIONAL AGE is 10 weeks 6 days +/- 7 days  ANATOMY COMMENTS      Anatomic detail is extremely limited at this gestational age  A discrete   fetal pole with cardiac motion was documented  Limb buds were documented   as well  The gestational sac is normal in appearance and located in the   fundus of the uterus  No gross abnormalities were noted on this   examination  Free fluid is not seen in the posterior cul-de-sac  There is   no suspicion of a subchorionic hematoma  ADNEXA      The left ovary appeared normal and measured 3 2 x 2 0 x 1 9 cm with a   volume of 6 4 cc  The right ovary was not visualized  AMNIOTIC FLUID         Largest Vertical Pocket = 2 8 cm   Amniotic Fluid: Normal      IMPRESSION      [facility] IUP   10 weeks and 6 days by this ultrasound  (IDA=2018)   Breech presentation   Regular fetal heart rate of 156 bpm   Anterior placenta      GENERAL COMMENT      Today's ultrasound is overall reassuring without significant fetal   abnormalities appreciated or suspected in a study that is otherwise   limited by early gestational age  Given the patient's history of hypertension in 2 prior pregnancies, I   recommend initiating low dose aspirin therapy  A recent meta-analysis   yielded risk reductions of 24% for preeclampsia, 20% for intrauterine   growth restriction, and 14% for  birth, with an absolute risk   reduction of 2-5% for preeclampsia, one to 5% for intrauterine growth   restriction, and 2-4% for  birth  In this study, there was no   identified risk of harm to the mother or fetus but long-term evidence was   somewhat limited  Given the overall safety profile and risk-benefit   analysis, I recommend an 81 mg aspirin be taken everyday until   approximately 35 weeks  Ideally, low dose aspirin therapy should commence   prior to 16 weeks because if aspirin is taking later, it does not appear   to be as effective    I reviewed these recommendations with the patient and   answered all of her questions to apparent satisfaction  We discussed follow-up in detail and I recommend Crystal return at 20   weeks for a detailed fetal anatomic evaluation  Thank you very much for allowing us to participate in the care of this   very nice patient  Should you have any questions, please do not hesitate   to contact our office  Please note, in addition to the time spent discussing the results of the   ultrasound, I spent approximately 10 minutes of face-to-face time with the   patient, greater than 50% of which was spent in counseling and the   coordination of care for this patient  ARMAND Guo M D     Electronically signed 11/29/17 15:28

## 2018-01-20 ENCOUNTER — HOSPITAL ENCOUNTER (EMERGENCY)
Facility: HOSPITAL | Age: 23
Discharge: HOME | End: 2018-01-20
Attending: EMERGENCY MEDICINE | Admitting: EMERGENCY MEDICINE
Payer: COMMERCIAL

## 2018-01-20 VITALS
SYSTOLIC BLOOD PRESSURE: 119 MMHG | RESPIRATION RATE: 16 BRPM | BODY MASS INDEX: 32.01 KG/M2 | HEART RATE: 78 BPM | WEIGHT: 175 LBS | DIASTOLIC BLOOD PRESSURE: 61 MMHG | TEMPERATURE: 98 F | OXYGEN SATURATION: 100 %

## 2018-01-20 DIAGNOSIS — R10.9 ABDOMINAL CRAMPS: ICD-10-CM

## 2018-01-20 DIAGNOSIS — R42 LIGHTHEADEDNESS: Primary | ICD-10-CM

## 2018-01-20 DIAGNOSIS — R51.9 HEADACHE: ICD-10-CM

## 2018-01-20 LAB
BACTERIA UR QL AUTO: ABNORMAL /HPF
BILIRUB UR QL STRIP: ABNORMAL
CLARITY UR: ABNORMAL
COLOR UR: YELLOW
EXT PREG TEST URINE: NORMAL
GLUCOSE UR STRIP-MCNC: NEGATIVE MG/DL
HGB UR QL STRIP.AUTO: NEGATIVE
KETONES UR STRIP-MCNC: ABNORMAL MG/DL
LEUKOCYTE ESTERASE UR QL STRIP: ABNORMAL
MUCOUS THREADS UR QL AUTO: ABNORMAL
NITRITE UR QL STRIP: NEGATIVE
NON-SQ EPI CELLS URNS QL MICRO: ABNORMAL /HPF
PH UR STRIP.AUTO: 6 [PH] (ref 4.5–8)
PROT UR STRIP-MCNC: ABNORMAL MG/DL
RBC #/AREA URNS AUTO: ABNORMAL /HPF
SP GR UR STRIP.AUTO: >=1.03 (ref 1–1.03)
UROBILINOGEN UR QL STRIP.AUTO: 0.2 E.U./DL
WBC #/AREA URNS AUTO: ABNORMAL /HPF

## 2018-01-20 PROCEDURE — 96361 HYDRATE IV INFUSION ADD-ON: CPT

## 2018-01-20 PROCEDURE — 96374 THER/PROPH/DIAG INJ IV PUSH: CPT

## 2018-01-20 PROCEDURE — 99284 EMERGENCY DEPT VISIT MOD MDM: CPT

## 2018-01-20 PROCEDURE — 81002 URINALYSIS NONAUTO W/O SCOPE: CPT | Performed by: EMERGENCY MEDICINE

## 2018-01-20 PROCEDURE — 81001 URINALYSIS AUTO W/SCOPE: CPT

## 2018-01-20 PROCEDURE — 81025 URINE PREGNANCY TEST: CPT | Performed by: EMERGENCY MEDICINE

## 2018-01-20 RX ORDER — METOCLOPRAMIDE HYDROCHLORIDE 5 MG/ML
10 INJECTION INTRAMUSCULAR; INTRAVENOUS ONCE
Status: COMPLETED | OUTPATIENT
Start: 2018-01-20 | End: 2018-01-20

## 2018-01-20 RX ADMIN — SODIUM CHLORIDE 1000 ML: 0.9 INJECTION, SOLUTION INTRAVENOUS at 15:19

## 2018-01-20 RX ADMIN — METOCLOPRAMIDE 10 MG: 5 INJECTION, SOLUTION INTRAMUSCULAR; INTRAVENOUS at 15:19

## 2018-01-20 NOTE — DISCHARGE INSTRUCTIONS
Lightheadedness   WHAT YOU NEED TO KNOW:   Lightheadedness is the feeling that you may faint, but you do not  Your heartbeat may be fast or feel like it flutters  Lightheadedness may occur when you take certain medicines, such as medicine to lower your blood pressure  Dehydration, low sodium, low blood sugar, an abnormal heart rhythm, and anxiety are other common causes  DISCHARGE INSTRUCTIONS:   Return to the emergency department if:   · You have sudden chest pain  · You have trouble breathing or shortness of breath  · You have vision changes, are sweating, and have nausea while you are sitting or lying down  · You feel flushed and your heart is fluttering  · You faint  Contact your healthcare provider if:   · You feel lightheaded often  · Your heart beats faster or slower than usual      · You have questions or concerns about your condition or care  Follow up with your healthcare provider as directed: You may need more tests to help find the cause of your lightheadedness  The tests will help healthcare providers plan the best treatment for you  Write down your questions so you remember to ask them during your visits  Self-care:  Talk with your healthcare provider about these and other ways to manage your symptoms:  · Lie down  when you feel lightheaded, your throat gets tight, or your vision changes  Raise your legs above the level of your heart  · Stand up slowly  Sit on the side of the bed or couch for a few minutes before you stand up  · Take slow, deep breaths when you feel lightheaded  This can help decrease the feeling that you might faint  · Ask if you need to avoid hot baths and saunas  These may make your symptoms worse  Watch for signs of low blood sugar: These include hunger, nervousness, sweating, and fast or fluttery heartbeats  Talk with your healthcare provider about ways to keep your blood sugar level steady    Check your blood pressure often:  You should do this especially if you take medicine to lower your blood pressure  Check your blood pressure when you are lying down and when you are standing  Ask how often to check during the day  Keep a record of your blood pressure numbers  Your healthcare provider may use the record to help plan your treatment  Keep a record of your lightheadedness episodes:  Include your symptoms and your activity before and after the episode  The record can help your healthcare provider find the cause of your lightheadedness and help you manage episodes  © 2017 [facility] [facility] Information is for End User's use only and may not be sold, redistributed or otherwise used for commercial purposes  All illustrations and images included in CareNotes® are the copyrighted property of A D A M [employer]  or Jesse Turner  The above information is an  only  It is not intended as medical advice for individual conditions or treatments  Talk to your doctor, nurse or pharmacist before following any medical regimen to see if it is safe and effective for you

## 2018-01-20 NOTE — ED PROVIDER NOTES
History  Chief Complaint   Patient presents with    Dizziness     Pt states "I took a hot shower 2 days ago and since then I have been really dizzy and with a bad migraine", pt is reportedly 19 weeks pregnant, unsure who her Ob doctor is  States that she has had lower belly pain which she called her doctor for a few days ago "but they never called me back"      19 weeks pregnant p/w "I have a migraine" x 2 days  Migraine is frontal, constant  Associated with lightheadedness, some light sensitivity and N/V  Also notes intermittent lower abd cramps  Denies bleeding  Called her OBGYN a few days ago "but they were on lunch break "        History provided by:  Patient   used: No    Dizziness   Timing:  Constant  Progression:  Unchanged  Chronicity:  New  Relieved by:  Nothing  Worsened by:  Nothing  Ineffective treatments:  None tried  Associated symptoms: headaches, nausea and vomiting    Associated symptoms: no vision changes and no weakness        Prior to Admission Medications   Prescriptions Last Dose Informant Patient Reported? Taking? Prenatal Vit-Fe Fumarate-FA (PRENATAL VITAMIN PLUS LOW IRON) 27-1 MG TABS   No No   Sig: Take 1 tablet by mouth daily   acetaminophen (TYLENOL) 500 mg tablet   Yes No   Sig: Take 1,000 mg by mouth every 6 (six) hours as needed for mild pain   ondansetron (ZOFRAN) 4 mg tablet   No No   Sig: Take 1 tablet by mouth every 8 (eight) hours as needed for nausea or vomiting      Facility-Administered Medications: None       Past Medical History:   Diagnosis Date    Asthma        Past Surgical History:   Procedure Laterality Date     SECTION         History reviewed  No pertinent family history  I have reviewed and agree with the history as documented  Social History   Substance Use Topics    Smoking status: Never Smoker    Smokeless tobacco: Never Used    Alcohol use No        Review of Systems   Constitutional: Negative for chills and fever  HENT: Negative for congestion, rhinorrhea, sinus pressure and sore throat  Eyes: Negative for photophobia, pain, redness and visual disturbance  Respiratory: Negative for cough  Gastrointestinal: Positive for abdominal pain (Intermittent, lower abd cramps), nausea and vomiting  Musculoskeletal: Negative for myalgias, neck pain and neck stiffness  Skin: Negative for rash  Neurological: Positive for dizziness and headaches  Negative for facial asymmetry, speech difficulty, weakness, light-headedness and numbness  Psychiatric/Behavioral: Negative for confusion  All other systems reviewed and are negative  Physical Exam  ED Triage Vitals [01/20/18 1257]   Temperature Pulse Respirations Blood Pressure SpO2   98 °F (36 7 °C) 89 18 155/67 97 %      Temp src Heart Rate Source Patient Position - Orthostatic VS BP Location FiO2 (%)   -- Monitor Sitting Right arm --      Pain Score       8           Orthostatic Vital Signs  Vitals:    01/20/18 1257 01/20/18 1523 01/20/18 1603   BP: 155/67 120/67 119/61   Pulse: 89 72 78   Patient Position - Orthostatic VS: Sitting Sitting Lying       Physical Exam   Constitutional: She is oriented to person, place, and time  She appears well-developed and well-nourished  No distress  HENT:   Head: Normocephalic  Mouth/Throat: Oropharynx is clear and moist and mucous membranes are normal    Neck: Normal range of motion  Neck supple  Cardiovascular: Normal rate, regular rhythm, normal heart sounds and intact distal pulses  Exam reveals no gallop and no friction rub  No murmur heard  Pulmonary/Chest: Effort normal and breath sounds normal  No respiratory distress  She has no wheezes  She has no rales  Abdominal: Soft  Normal appearance and bowel sounds are normal  She exhibits no distension and no mass  There is no hepatosplenomegaly  There is no tenderness   There is no rigidity, no rebound, no guarding, no CVA tenderness, no tenderness at McBurney's point and negative Gomez's sign  Musculoskeletal: She exhibits no edema  Lymphadenopathy:     She has no cervical adenopathy  Neurological: She is alert and oriented to person, place, and time  She has normal strength  No cranial nerve deficit or sensory deficit  GCS eye subscore is 4  GCS verbal subscore is 5  GCS motor subscore is 6  Skin: Skin is warm, dry and intact  No rash noted  No pallor  Nursing note and vitals reviewed        ED Medications  Medications   metoclopramide (REGLAN) injection 10 mg (10 mg Intravenous Given 1/20/18 1519)   sodium chloride 0 9 % bolus 1,000 mL (0 mL Intravenous Stopped 1/20/18 1621)       Diagnostic Studies  Results Reviewed     Procedure Component Value Units Date/Time    Urine Microscopic [[id number]]  (Abnormal) Collected:  01/20/18 1325    Lab Status:  Final result Specimen:  Urine from Urine, Clean Catch Updated:  01/20/18 1355     RBC, UA None Seen /hpf      WBC, UA 1-2 (A) /hpf      Epithelial Cells Occasional /hpf      Bacteria, UA Occasional /hpf      MUCOUS THREADS Moderate    POCT pregnancy, urine [[id number]]  (Normal) Resulted:  01/20/18 1330    Lab Status:  Edited Result - FINAL Updated:  01/20/18 1335     EXT PREG TEST UR (Ref: Negative) Positive ( + )    POCT urinalysis dipstick [[id number]]  (Abnormal) Resulted:  01/20/18 1331    Lab Status:  Final result Specimen:  Urine Updated:  01/20/18 1331    ED Urine Macroscopic [[id number]]  (Abnormal) Collected:  01/20/18 1325    Lab Status:  Final result Specimen:  Urine Updated:  01/20/18 1326     Color, UA Yellow     Clarity, UA Slightly Cloudy     pH, UA 6 0     Leukocytes, UA Small (A)     Nitrite, UA Negative     Protein, UA 30 (1+) (A) mg/dl      Glucose, UA Negative mg/dl      Ketones, UA 15 (1+) (A) mg/dl      Urobilinogen, UA 0 2 E U /dl      Bilirubin, UA Interference- unable to analyze (A)     Blood, UA Negative     Specific Gravity, UA >=1 030    Narrative:       CLINITEK RESULT                 No orders to display                  Procedures  Pelvic Ultrasound  Performed by: Mellisa Richard  Authorized by: Mellisa Richard     Procedure date/time:  1/20/2018 1:51 PM  Patient location:  Bedside  Procedure details:     Indications: evaluate for IUP    Uterine findings:     Intrauterine pregnancy: identified      Fetal heart rate (bpm):  144           Phone Contacts  ED Phone Contact    ED Course  ED Course as of Jan 20 1650   Sat Jan 20, 2018   1613 Pt reports resolution of symptoms  MDM  Number of Diagnoses or Management Options  Diagnosis management comments: 1  Migraine - Symptomatic tx   2   Intermittent lower abd cramps - Will check urine to r/o UTI, bedside US to r/o ectopic pregnancy  CritCare Time    Disposition  Final diagnoses:   Lightheadedness   Headache   Abdominal cramps     Time reflects when diagnosis was documented in both MDM as applicable and the Disposition within this note     Time User Action Codes Description Comment    1/20/2018  4:13 PM Ashley Cardinal [R42] Lightheadedness     1/20/2018  4:13 PM Carl Lemons Add [R51] Headache     1/20/2018  4:14 PM Naz Petersen 46 [R10 9] Abdominal cramps       ED Disposition     ED Disposition Condition Comment    Discharge  [facility] discharge to home/self care  Condition at discharge: Good        Follow-up Information    None       Discharge Medication List as of 1/20/2018  4:14 PM      CONTINUE these medications which have NOT CHANGED    Details   acetaminophen (TYLENOL) 500 mg tablet Take 1,000 mg by mouth every 6 (six) hours as needed for mild pain, Historical Med      ondansetron (ZOFRAN) 4 mg tablet Take 1 tablet by mouth every 8 (eight) hours as needed for nausea or vomiting, Starting Sun 11/26/2017, Print      Prenatal Vit-Fe Fumarate-FA (PRENATAL VITAMIN PLUS LOW IRON) 27-1 MG TABS Take 1 tablet by mouth daily, Starting Tue 11/28/2017, Print           No discharge procedures on file      ED Provider  Electronically Signed by           DO Gerson  01/20/18 7176

## 2018-01-22 VITALS
SYSTOLIC BLOOD PRESSURE: 133 MMHG | WEIGHT: 159.05 LBS | DIASTOLIC BLOOD PRESSURE: 85 MMHG | HEART RATE: 93 BPM | HEIGHT: 61 IN | BODY MASS INDEX: 30.03 KG/M2

## 2018-03-01 ENCOUNTER — ROUTINE PRENATAL (OUTPATIENT)
Dept: PERINATAL CARE | Facility: CLINIC | Age: 23
End: 2018-03-01
Payer: COMMERCIAL

## 2018-03-01 VITALS
SYSTOLIC BLOOD PRESSURE: 123 MMHG | DIASTOLIC BLOOD PRESSURE: 76 MMHG | HEART RATE: 108 BPM | WEIGHT: 179.2 LBS | HEIGHT: 64 IN | BODY MASS INDEX: 30.59 KG/M2

## 2018-03-01 DIAGNOSIS — Z36.3 ENCOUNTER FOR ANTENATAL SCREENING FOR MALFORMATIONS: Primary | ICD-10-CM

## 2018-03-01 DIAGNOSIS — Z3A.24 24 WEEKS GESTATION OF PREGNANCY: ICD-10-CM

## 2018-03-01 DIAGNOSIS — O99.212 OBESITY AFFECTING PREGNANCY IN SECOND TRIMESTER: ICD-10-CM

## 2018-03-01 PROCEDURE — 99212 OFFICE O/P EST SF 10 MIN: CPT | Performed by: OBSTETRICS & GYNECOLOGY

## 2018-03-01 PROCEDURE — 76811 OB US DETAILED SNGL FETUS: CPT | Performed by: OBSTETRICS & GYNECOLOGY

## 2018-03-01 NOTE — PATIENT INSTRUCTIONS
Please take 81mg daily aspirin as advised  Please return in 6 weeks for fetal growth measurement  Preeclampsia   WHAT YOU NEED TO KNOW:   What is preeclampsia? Preeclampsia is a condition that can develop during week 20 or later of your pregnancy  Preeclampsia means you have high blood pressure and may have protein in your urine  Preeclampsia can cause mild to life-threatening health problems for you and your unborn baby  What are the signs and symptoms of preeclampsia? You may not have any symptoms  Severe preeclampsia may cause any of the following symptoms:  · Swollen face and hands    · A sudden weight gain of 5 pounds or more    · Headache    · Spotted or blurred vision     · Pain in your upper abdomen  What increases my risk for preeclampsia? · First pregnancy    · Pregnant with twins or multiples    · Personal or family history of preeclampsia or eclampsia    · Overweight    · Diabetes, high blood pressure, or kidney disease    · Age older than 40 years  How is preeclampsia diagnosed? · A blood pressure  of 140/90 mmHg or more for at least 2 readings may mean you have preeclampsia  Your blood pressure will need to be checked 1 to 2 times a week until your baby is born  · Blood tests  are done to check your liver and kidney function  You may need blood tests every week while you are pregnant  · Urine tests  are used to check for protein  You may need to give healthcare providers a urine sample at each visit  You may also need to collect your urine every time you urinate for 24 hours  How will my unborn baby be monitored? You may need to keep track of how often your baby moves or kicks over a certain amount of time  Ask your healthcare provider how to do kick counts and how often to do them  You may also need the following tests at each visit until your baby is born:  · A fetal biophysical profile  combines a nonstress test and an ultrasound of your unborn baby   The nonstress test measures changes in your baby's heartbeat when he moves  The ultrasound will show your baby's movement, growth, and how his breathing muscles are working  Healthcare providers can check the amount of fluid around your baby  The ultrasound will also show how well your baby's lungs are working  · An umbilical cord Doppler  checks blood flow through the umbilical cord  How is preeclampsia treated? · Medicines  may be given to lower your blood pressure, protect your organs, or prevent seizures  Low doses of aspirin after 12 weeks of pregnancy may be recommended if you are at high risk for preeclampsia  Aspirin may help prevent preeclampsia or problems that can happen from preeclampsia  Do not take aspirin unless directed by your healthcare provider  · Rest  as directed  Your healthcare provider may tell you to rest more often if you have mild symptoms of preeclampsia  Lie on your left side as often as you can  You may need complete bedrest if you have more severe symptoms  You may need to be in the hospital if your condition worsens  · Delivery  usually stops preeclampsia  Healthcare providers may deliver your baby right away if he is full-term (37 weeks or more)  He may need to be delivered early if you or the baby has life-threatening symptoms  What are the risks of preeclampsia? Your baby may not grow as he should and may need to be delivered early  Placental abruption can occur if the placenta pulls away from the uterus too soon  This condition is life-threatening for your baby  High blood pressure that is not controlled can lead to blood clots, kidney or liver failure, or stroke  Severe preeclampsia can cause seizures or coma  This condition is called eclampsia  Eclampsia is a life-threatening condition for you and your unborn baby  Call 911 for any of the following:   · You have a seizure  · You have severe abdominal pain with nausea and vomiting  When should I seek immediate care?    · You develop a severe headache that does not go away  · You have blurred or spotted vision that does not go away  · You are bleeding from your vagina  · You have new or increased swelling in your face or hands  · You are urinating little or not at all  When should I contact my healthcare provider? · You are urinating less than usual      · You do not feel your baby's movements as often as usual     · You have questions or concerns about your condition or care  CARE AGREEMENT:   You have the right to help plan your care  Learn about your health condition and how it may be treated  Discuss treatment options with your caregivers to decide what care you want to receive  You always have the right to refuse treatment  The above information is an  only  It is not intended as medical advice for individual conditions or treatments  Talk to your doctor, nurse or pharmacist before following any medical regimen to see if it is safe and effective for you  © 2017 [employer] Information is for End User's use only and may not be sold, redistributed or otherwise used for commercial purposes  All illustrations and images included in CareNotes® are the copyrighted property of [employer]  or [employer]

## 2018-03-01 NOTE — PROGRESS NOTES
[facility]: Ms Irena Alcantara was seen today at 24w4d for anatomic survey ultrasound  See ultrasound report under "OB Procedures" tab  Please don't hesitate to contact our office with any concerns or questions    Simin Rhodes MD

## 2018-05-17 PROCEDURE — G0145 SCR C/V CYTO,THINLAYER,RESCR: HCPCS | Performed by: PATHOLOGY

## 2018-05-17 PROCEDURE — 87624 HPV HI-RISK TYP POOLED RSLT: CPT | Performed by: OBSTETRICS & GYNECOLOGY

## 2018-05-17 PROCEDURE — G0124 SCREEN C/V THIN LAYER BY MD: HCPCS | Performed by: PATHOLOGY

## 2018-05-18 ENCOUNTER — LAB REQUISITION (OUTPATIENT)
Dept: LAB | Facility: HOSPITAL | Age: 23
End: 2018-05-18
Payer: COMMERCIAL

## 2018-05-18 DIAGNOSIS — Z34.80 ENCOUNTER FOR SUPERVISION OF OTHER NORMAL PREGNANCY, UNSPECIFIED TRIMESTER: ICD-10-CM

## 2018-05-18 LAB
CHLAMYDIA TRACHOMATIS DNA SDA,GENITAL (HISTORICAL): NORMAL
N GONORRHOEAE DNA SDA,GENITAL (HISTORICAL): NORMAL
STREP GRP B, MOLECULAR (HISTORICAL): DETECTED

## 2018-05-21 LAB — HPV RRNA GENITAL QL NAA+PROBE: ABNORMAL

## 2018-05-22 LAB
ALBUMIN SERPL BCP-MCNC: 3.1 G/DL (ref 3–5.2)
ALP SERPL-CCNC: 100 U/L (ref 43–122)
ALT SERPL W P-5'-P-CCNC: 16 U/L (ref 9–52)
AMORPHOUS MATERIAL (HISTORICAL): ABNORMAL
ANION GAP SERPL CALCULATED.3IONS-SCNC: 6 MMOL/L (ref 5–14)
AST SERPL W P-5'-P-CCNC: 12 U/L (ref 14–36)
BACTERIA UR QL AUTO: ABNORMAL
BILIRUB SERPL-MCNC: 0.3 MG/DL
BILIRUB UR QL STRIP: NEGATIVE MG/DL
BUN SERPL-MCNC: 6 MG/DL (ref 5–25)
CALCIUM SERPL-MCNC: 8.6 MG/DL (ref 8.4–10.2)
CASTS/CASTS TYPE (HISTORICAL): ABNORMAL /LPF
CHLORIDE SERPL-SCNC: 107 MEQ/L (ref 97–108)
CLARITY UR: ABNORMAL
CO2 SERPL-SCNC: 24 MMOL/L (ref 22–30)
COLOR UR: YELLOW
CREATINE, SERUM (HISTORICAL): 0.43 MG/DL (ref 0.6–1.2)
CREATININE, RANDOM URINE (HISTORICAL): 76.9 MG/DL (ref 50–200)
CRYSTAL TYPE (HISTORICAL): ABNORMAL /HPF
EGFR (HISTORICAL): >60 ML/MIN/1.73 M2
GLUCOSE 1 HR 50 GM GLUC CHALLENGE-PREG PTS (HISTORICAL): 103 MG/DL
GLUCOSE SERPL-MCNC: 103 MG/DL (ref 70–99)
GLUCOSE UR STRIP-MCNC: NEGATIVE MG/DL
HEMOGLOBIN A2 QUANTITATION (HISTORICAL): 2.4
HEMOGLOBIN E (HISTORICAL): 0
HEMOGLOBIN F QUANTITATION (HISTORICAL): 0.3
HEMOGLOBIN S QUANTITATION (HISTORICAL): 0
HEMOGLOBIN,CAPILLARY ELP (HISTORICAL): NORMAL
HGB A (HISTORICAL): 97.3
HGB C (HISTORICAL): 0
HGB UR QL STRIP.AUTO: NEGATIVE
KETONES UR STRIP-MCNC: NEGATIVE MG/DL
LEUKOCYTE ESTERASE UR QL STRIP: ABNORMAL
Lab: NORMAL
MUCOUS THREADS URNS QL MICRO: ABNORMAL
NITRITE UR QL STRIP: NEGATIVE
NON-SQ EPI CELLS URNS QL MICRO: ABNORMAL
OTHER HEMOGLOBIN 1 (HISTORICAL): 0
OTHER STN SPEC: ABNORMAL
PATHOLOGIST INTERPRET. (HISTORICAL): NORMAL
PH UR STRIP.AUTO: 7 [PH] (ref 4.5–8)
POTASSIUM SERPL-SCNC: 3.3 MEQ/L (ref 3.6–5)
PROT UR STRIP-MCNC: 15 MG/DL
PROT UR-MCNC: 16 MG/DL
PROT/CREAT UR: 208 MG/G
RBC #/AREA URNS AUTO: ABNORMAL /HPF
SICKLE CELLS (HISTORICAL): NORMAL
SODIUM SERPL-SCNC: 137 MEQ/L (ref 137–147)
SP GR UR STRIP.AUTO: 1.01 (ref 1–1.04)
TOTAL PROTEIN (HISTORICAL): 5.9 G/DL (ref 5.9–8.4)
TSH SERPL DL<=0.05 MIU/L-ACNC: 1.57 UIU/ML (ref 0.47–4.68)
URIC ACID (HISTORICAL): 3 MG/DL (ref 2.5–7.5)
UROBILINOGEN UR QL STRIP.AUTO: NEGATIVE MG/DL (ref 0–1)
WBC #/AREA URNS AUTO: ABNORMAL /HPF

## 2018-05-23 ENCOUNTER — ULTRASOUND (OUTPATIENT)
Dept: PERINATAL CARE | Facility: OTHER | Age: 23
End: 2018-05-23
Payer: COMMERCIAL

## 2018-05-23 VITALS
HEART RATE: 99 BPM | WEIGHT: 184.6 LBS | HEIGHT: 64 IN | DIASTOLIC BLOOD PRESSURE: 80 MMHG | SYSTOLIC BLOOD PRESSURE: 120 MMHG | BODY MASS INDEX: 31.51 KG/M2

## 2018-05-23 DIAGNOSIS — O99.340 DEPRESSION AFFECTING PREGNANCY: ICD-10-CM

## 2018-05-23 DIAGNOSIS — Z92.89 HISTORY OF TRANSFUSION OF PACKED RBC: ICD-10-CM

## 2018-05-23 DIAGNOSIS — F32.A DEPRESSION AFFECTING PREGNANCY: ICD-10-CM

## 2018-05-23 DIAGNOSIS — O34.219 MATERNAL CARE FOR SCAR FROM PREVIOUS CESAREAN DELIVERY, UNSPECIFIED PRIOR CESAREAN DELIVERY TYPE: ICD-10-CM

## 2018-05-23 DIAGNOSIS — O09.33 INSUFFICIENT PRENATAL CARE IN THIRD TRIMESTER: ICD-10-CM

## 2018-05-23 DIAGNOSIS — Z3A.36 36 WEEKS GESTATION OF PREGNANCY: ICD-10-CM

## 2018-05-23 DIAGNOSIS — Z63.4 WIDOWED: ICD-10-CM

## 2018-05-23 DIAGNOSIS — Z36.89 ENCOUNTER FOR ULTRASOUND TO CHECK FETAL GROWTH: Primary | ICD-10-CM

## 2018-05-23 LAB
ABSOL LYMPHOCYTES (HISTORICAL): 1.3 K/UL (ref 0.5–4)
BASOPHILS # BLD AUTO: 0 % (ref 0–1)
BASOPHILS # BLD AUTO: 0 K/UL (ref 0–0.1)
COMMENT (HISTORICAL): ABNORMAL
DEPRECATED RDW RBC AUTO: 19.3 %
EOSINOPHIL # BLD AUTO: 0.1 K/UL (ref 0–0.4)
EOSINOPHIL NFR BLD AUTO: 2 % (ref 0–6)
HCT VFR BLD AUTO: 23.4 % (ref 36–46)
HEPATITIS B SURFACE AG CONFIRMATION (HISTORICAL): ABNORMAL
HEPATITIS C ANTIBODY (HISTORICAL): NORMAL
HGB BLD-MCNC: 7.1 G/DL (ref 12–16)
HIV 1/2 AB DIFFERENTIATION (HISTORICAL): NEGATIVE
LAB AP GYN PRIMARY INTERPRETATION: NORMAL
LAB AP LMP: NORMAL
LYMPHOCYTES NFR BLD AUTO: 19 % (ref 25–45)
Lab: NORMAL
MCH RBC QN AUTO: 18.6 PG (ref 26–34)
MCHC RBC AUTO-ENTMCNC: 30.5 % (ref 31–36)
MCV RBC AUTO: 61 FL (ref 80–100)
MONOCYTES # BLD AUTO: 0.4 K/UL (ref 0.2–0.9)
MONOCYTES NFR BLD AUTO: 6 % (ref 1–10)
NEUTROPHILS ABS COUNT (HISTORICAL): 5.2 K/UL (ref 1.8–7.8)
NEUTS SEG NFR BLD AUTO: 73 % (ref 45–65)
PATH INTERP SPEC-IMP: NORMAL
PLATELET # BLD AUTO: 188 K/MCL (ref 150–450)
RBC # BLD AUTO: 3.84 M/MCL (ref 4–5.2)
RBC MORPHOLOGY (HISTORICAL): ABNORMAL
RPR SCREEN (HISTORICAL): ABNORMAL
RUBELLA, IGG (HISTORICAL): 6.5 IU/ML
WBC # BLD AUTO: 7 K/MCL (ref 4.5–11)

## 2018-05-23 PROCEDURE — 99213 OFFICE O/P EST LOW 20 MIN: CPT | Performed by: OBSTETRICS & GYNECOLOGY

## 2018-05-23 PROCEDURE — 76816 OB US FOLLOW-UP PER FETUS: CPT | Performed by: OBSTETRICS & GYNECOLOGY

## 2018-05-23 SDOH — SOCIAL STABILITY - SOCIAL INSECURITY: DISSAPEARANCE AND DEATH OF FAMILY MEMBER: Z63.4

## 2018-05-23 NOTE — PROGRESS NOTES
[facility]: Ms Izaiah Bazan was seen today at 36w3d for fetal growth assessment ultrasound  She has required transfusion with both prior deliveries  Please also note that she was  this pregnancy and was depressed today so I prescribed her Zoloft  She is interested in a social work consult surrounding delivery which is scheduled for 6/11/18  See ultrasound report under "OB Procedures" tab  Please don't hesitate to contact our office with any concerns or questions    Deana Dubois MD

## 2018-05-24 LAB — VARICELLA ZOSTER IGG (HISTORICAL): 637

## 2018-06-01 LAB
ABSOL LYMPHOCYTES (HISTORICAL): 1.7 K/UL (ref 0.5–4)
ALBUMIN SERPL BCP-MCNC: 3.2 G/DL (ref 3–5.2)
ALP SERPL-CCNC: 116 U/L (ref 43–122)
ALT SERPL W P-5'-P-CCNC: 22 U/L (ref 9–52)
ANION GAP SERPL CALCULATED.3IONS-SCNC: 8 MMOL/L (ref 5–14)
AST SERPL W P-5'-P-CCNC: 14 U/L (ref 14–36)
BANDS (HISTORICAL): 5 % (ref 3–11)
BILIRUB SERPL-MCNC: 0.3 MG/DL
BUN SERPL-MCNC: 9 MG/DL (ref 5–25)
CALCIUM SERPL-MCNC: 8.9 MG/DL (ref 8.4–10.2)
CHLORIDE SERPL-SCNC: 106 MEQ/L (ref 97–108)
CO2 SERPL-SCNC: 23 MMOL/L (ref 22–30)
COMMENT (HISTORICAL): ABNORMAL
CREATINE, SERUM (HISTORICAL): 0.44 MG/DL (ref 0.6–1.2)
DEPRECATED RDW RBC AUTO: 18.9 %
EGFR (HISTORICAL): >60 ML/MIN/1.73 M2
FERRITIN SERPL-MCNC: 4 NG/ML (ref 6–137)
FOLATE SERPL-MCNC: 10.1 NG/ML
GLUCOSE SERPL-MCNC: 67 MG/DL (ref 70–99)
HCT VFR BLD AUTO: 23.4 % (ref 36–46)
HGB BLD-MCNC: 7.1 G/DL (ref 12–16)
IRON SERPL-MCNC: 22 UG/DL (ref 37–170)
LDH (HISTORICAL): 447 U/L (ref 313–618)
LYMPHOCYTES NFR BLD AUTO: 21 % (ref 25–45)
MCH RBC QN AUTO: 18.4 PG (ref 26–34)
MCHC RBC AUTO-ENTMCNC: 30.5 % (ref 31–36)
MCV RBC AUTO: 61 FL (ref 80–100)
MONOCYTES # BLD AUTO: 0.6 K/UL (ref 0.2–0.9)
MONOCYTES NFR BLD AUTO: 7 % (ref 1–10)
NEUTROPHILS ABS COUNT (HISTORICAL): 6 K/UL (ref 1.8–7.8)
NEUTS SEG NFR BLD AUTO: 67 % (ref 45–65)
PERCENT SATURATION (HISTORICAL): 4 % (ref 11–46)
PLATELET # BLD AUTO: 233 K/MCL (ref 150–450)
POTASSIUM SERPL-SCNC: 4.2 MEQ/L (ref 3.6–5)
RBC # BLD AUTO: 3.87 M/MCL (ref 4–5.2)
RBC MORPHOLOGY (HISTORICAL): ABNORMAL
SODIUM SERPL-SCNC: 137 MEQ/L (ref 137–147)
TIBC SERPL-MCNC: 553 UG/DL (ref 265–497)
TOTAL PROTEIN (HISTORICAL): 6.3 G/DL (ref 5.9–8.4)
VIT B12 SERPL-MCNC: 380 PG/ML (ref 239–931)
WBC # BLD AUTO: 8.3 K/MCL (ref 4.5–11)

## 2018-06-05 LAB — METHYLMALONIC ACID, S (HISTORICAL): 0.14

## 2018-06-10 ENCOUNTER — HOSPITAL ENCOUNTER (OUTPATIENT)
Dept: LABOR AND DELIVERY | Facility: HOSPITAL | Age: 23
Setting detail: SURGERY ADMIT
Discharge: HOME | DRG: 540 | End: 2018-06-10
Payer: COMMERCIAL

## 2018-06-10 ENCOUNTER — HOSPITAL ENCOUNTER (INPATIENT)
Facility: HOSPITAL | Age: 23
LOS: 3 days | Discharge: HOME | DRG: 540 | End: 2018-06-13
Attending: OBSTETRICS & GYNECOLOGY | Admitting: OBSTETRICS & GYNECOLOGY
Payer: COMMERCIAL

## 2018-06-10 DIAGNOSIS — Z3A.39 39 WEEKS GESTATION OF PREGNANCY: Primary | ICD-10-CM

## 2018-06-10 DIAGNOSIS — Z98.891 H/O: CESAREAN SECTION: ICD-10-CM

## 2018-06-10 PROCEDURE — 30233N1 TRANSFUSION OF NONAUTOLOGOUS RED BLOOD CELLS INTO PERIPHERAL VEIN, PERCUTANEOUS APPROACH: ICD-10-PCS | Performed by: OBSTETRICS & GYNECOLOGY

## 2018-06-10 PROCEDURE — 86592 SYPHILIS TEST NON-TREP QUAL: CPT | Performed by: OBSTETRICS & GYNECOLOGY

## 2018-06-10 PROCEDURE — 85027 COMPLETE CBC AUTOMATED: CPT | Performed by: OBSTETRICS & GYNECOLOGY

## 2018-06-10 PROCEDURE — 80307 DRUG TEST PRSMV CHEM ANLYZR: CPT | Performed by: OBSTETRICS & GYNECOLOGY

## 2018-06-10 RX ORDER — ONDANSETRON 2 MG/ML
4 INJECTION INTRAMUSCULAR; INTRAVENOUS EVERY 8 HOURS PRN
Status: DISCONTINUED | OUTPATIENT
Start: 2018-06-10 | End: 2018-06-11

## 2018-06-10 RX ORDER — SODIUM CHLORIDE, SODIUM LACTATE, POTASSIUM CHLORIDE, CALCIUM CHLORIDE 600; 310; 30; 20 MG/100ML; MG/100ML; MG/100ML; MG/100ML
125 INJECTION, SOLUTION INTRAVENOUS CONTINUOUS
Status: DISCONTINUED | OUTPATIENT
Start: 2018-06-10 | End: 2018-06-13 | Stop reason: HOSPADM

## 2018-06-11 ENCOUNTER — ANESTHESIA (INPATIENT)
Dept: LABOR AND DELIVERY | Facility: HOSPITAL | Age: 23
DRG: 540 | End: 2018-06-11
Payer: COMMERCIAL

## 2018-06-11 PROBLEM — Z3A.39 39 WEEKS GESTATION OF PREGNANCY: Status: ACTIVE | Noted: 2018-06-11

## 2018-06-11 PROBLEM — Z98.891 H/O: CESAREAN SECTION: Status: ACTIVE | Noted: 2018-02-13

## 2018-06-11 LAB
ABO GROUP BLD: NORMAL
AMPHETAMINES SERPL QL SCN: NEGATIVE
BARBITURATES UR QL: NEGATIVE
BASE EXCESS BLDCOV CALC-SCNC: -2.6 MMOL/L (ref 1–9)
BENZODIAZ UR QL: NEGATIVE
BLD GP AB SCN SERPL QL: NEGATIVE
COCAINE UR QL: NEGATIVE
ERYTHROCYTE [DISTWIDTH] IN BLOOD BY AUTOMATED COUNT: 26.3 % (ref 11.6–15.1)
HCO3 BLDCOV-SCNC: 21.7 MMOL/L (ref 12.2–28.6)
HCT VFR BLD AUTO: 28.8 % (ref 34.8–46.1)
HGB BLD-MCNC: 8.2 G/DL (ref 11.5–15.4)
MCH RBC QN AUTO: 20 PG (ref 26.8–34.3)
MCHC RBC AUTO-ENTMCNC: 28.5 G/DL (ref 31.4–37.4)
MCV RBC AUTO: 70 FL (ref 82–98)
METHADONE UR QL: NEGATIVE
OPIATES UR QL SCN: NEGATIVE
OXYHGB MFR BLDCOV: 84.2 %
PCO2 BLDCOV: 36.6 MM HG (ref 27–43)
PCP UR QL: NEGATIVE
PH BLDCOV: 7.39 [PH] (ref 7.19–7.49)
PLATELET # BLD AUTO: 210 THOUSANDS/UL (ref 149–390)
PO2 BLDCOV: 38.8 MM HG (ref 15–45)
RBC # BLD AUTO: 4.11 MILLION/UL (ref 3.81–5.12)
RH BLD: POSITIVE
RPR SER QL: NORMAL
SAO2 % BLDCOV: 19.6 ML/DL
SPECIMEN EXPIRATION DATE: NORMAL
THC UR QL: NEGATIVE
WBC # BLD AUTO: 9.6 THOUSAND/UL (ref 4.31–10.16)

## 2018-06-11 PROCEDURE — 59514 CESAREAN DELIVERY ONLY: CPT | Performed by: OBSTETRICS & GYNECOLOGY

## 2018-06-11 PROCEDURE — 86900 BLOOD TYPING SEROLOGIC ABO: CPT | Performed by: OBSTETRICS & GYNECOLOGY

## 2018-06-11 PROCEDURE — P9021 RED BLOOD CELLS UNIT: HCPCS | Performed by: ANESTHESIOLOGY

## 2018-06-11 PROCEDURE — 82805 BLOOD GASES W/O2 SATURATION: CPT | Performed by: OBSTETRICS & GYNECOLOGY

## 2018-06-11 PROCEDURE — 4A1HXCZ MONITORING OF PRODUCTS OF CONCEPTION, CARDIAC RATE, EXTERNAL APPROACH: ICD-10-PCS | Performed by: OBSTETRICS & GYNECOLOGY

## 2018-06-11 PROCEDURE — 86901 BLOOD TYPING SEROLOGIC RH(D): CPT | Performed by: OBSTETRICS & GYNECOLOGY

## 2018-06-11 PROCEDURE — 86850 RBC ANTIBODY SCREEN: CPT | Performed by: OBSTETRICS & GYNECOLOGY

## 2018-06-11 PROCEDURE — 86920 COMPATIBILITY TEST SPIN: CPT | Performed by: ANESTHESIOLOGY

## 2018-06-11 RX ORDER — KETOROLAC TROMETHAMINE 30 MG/ML
15 INJECTION, SOLUTION INTRAMUSCULAR; INTRAVENOUS EVERY 6 HOURS PRN
Status: DISCONTINUED | OUTPATIENT
Start: 2018-06-11 | End: 2018-06-13 | Stop reason: HOSPADM

## 2018-06-11 RX ORDER — OXYCODONE HYDROCHLORIDE AND ACETAMINOPHEN 5; 325 MG/1; MG/1
1 TABLET ORAL EVERY 4 HOURS PRN
Status: DISCONTINUED | OUTPATIENT
Start: 2018-06-12 | End: 2018-06-13 | Stop reason: HOSPADM

## 2018-06-11 RX ORDER — DIPHENHYDRAMINE HYDROCHLORIDE 50 MG/ML
25 INJECTION INTRAMUSCULAR; INTRAVENOUS EVERY 6 HOURS PRN
Status: DISCONTINUED | OUTPATIENT
Start: 2018-06-11 | End: 2018-06-13 | Stop reason: HOSPADM

## 2018-06-11 RX ORDER — BUPIVACAINE HYDROCHLORIDE 7.5 MG/ML
INJECTION, SOLUTION INTRASPINAL AS NEEDED
Status: DISCONTINUED | OUTPATIENT
Start: 2018-06-11 | End: 2018-06-11 | Stop reason: SURG

## 2018-06-11 RX ORDER — ALBUTEROL SULFATE 90 UG/1
2 AEROSOL, METERED RESPIRATORY (INHALATION) EVERY 4 HOURS PRN
COMMUNITY
End: 2018-12-08

## 2018-06-11 RX ORDER — ONDANSETRON 2 MG/ML
4 INJECTION INTRAMUSCULAR; INTRAVENOUS EVERY 4 HOURS PRN
Status: DISPENSED | OUTPATIENT
Start: 2018-06-11 | End: 2018-06-12

## 2018-06-11 RX ORDER — IBUPROFEN 600 MG/1
600 TABLET ORAL EVERY 6 HOURS PRN
Status: DISCONTINUED | OUTPATIENT
Start: 2018-06-11 | End: 2018-06-13 | Stop reason: HOSPADM

## 2018-06-11 RX ORDER — OXYTOCIN/RINGER'S LACTATE 30/500 ML
PLASTIC BAG, INJECTION (ML) INTRAVENOUS CONTINUOUS PRN
Status: DISCONTINUED | OUTPATIENT
Start: 2018-06-11 | End: 2018-06-11 | Stop reason: SURG

## 2018-06-11 RX ORDER — OXYCODONE HYDROCHLORIDE AND ACETAMINOPHEN 5; 325 MG/1; MG/1
1 TABLET ORAL EVERY 4 HOURS PRN
Status: DISCONTINUED | OUTPATIENT
Start: 2018-06-11 | End: 2018-06-13 | Stop reason: HOSPADM

## 2018-06-11 RX ORDER — MORPHINE SULFATE 0.5 MG/ML
INJECTION, SOLUTION EPIDURAL; INTRATHECAL; INTRAVENOUS
Status: COMPLETED
Start: 2018-06-11 | End: 2018-06-11

## 2018-06-11 RX ORDER — SODIUM CHLORIDE, SODIUM LACTATE, POTASSIUM CHLORIDE, CALCIUM CHLORIDE 600; 310; 30; 20 MG/100ML; MG/100ML; MG/100ML; MG/100ML
125 INJECTION, SOLUTION INTRAVENOUS CONTINUOUS
Status: DISCONTINUED | OUTPATIENT
Start: 2018-06-11 | End: 2018-06-13 | Stop reason: HOSPADM

## 2018-06-11 RX ORDER — DEXAMETHASONE SODIUM PHOSPHATE 10 MG/ML
8 INJECTION, SOLUTION INTRAMUSCULAR; INTRAVENOUS ONCE AS NEEDED
Status: DISPENSED | OUTPATIENT
Start: 2018-06-11 | End: 2018-06-12

## 2018-06-11 RX ORDER — OXYTOCIN/RINGER'S LACTATE 30/500 ML
62.5 PLASTIC BAG, INJECTION (ML) INTRAVENOUS CONTINUOUS
Status: ACTIVE | OUTPATIENT
Start: 2018-06-11 | End: 2018-06-11

## 2018-06-11 RX ORDER — ONDANSETRON 2 MG/ML
4 INJECTION INTRAMUSCULAR; INTRAVENOUS ONCE AS NEEDED
Status: DISCONTINUED | OUTPATIENT
Start: 2018-06-11 | End: 2018-06-11

## 2018-06-11 RX ORDER — FENTANYL CITRATE/PF 50 MCG/ML
25 SYRINGE (ML) INJECTION
Status: DISCONTINUED | OUTPATIENT
Start: 2018-06-11 | End: 2018-06-11

## 2018-06-11 RX ORDER — MORPHINE SULFATE 0.5 MG/ML
INJECTION, SOLUTION EPIDURAL; INTRATHECAL; INTRAVENOUS AS NEEDED
Status: DISCONTINUED | OUTPATIENT
Start: 2018-06-11 | End: 2018-06-11 | Stop reason: SURG

## 2018-06-11 RX ORDER — ONDANSETRON 2 MG/ML
4 INJECTION INTRAMUSCULAR; INTRAVENOUS EVERY 8 HOURS PRN
Status: DISCONTINUED | OUTPATIENT
Start: 2018-06-11 | End: 2018-06-13 | Stop reason: HOSPADM

## 2018-06-11 RX ORDER — SIMETHICONE 80 MG
80 TABLET,CHEWABLE ORAL 4 TIMES DAILY PRN
Status: DISCONTINUED | OUTPATIENT
Start: 2018-06-11 | End: 2018-06-13 | Stop reason: HOSPADM

## 2018-06-11 RX ORDER — BUPIVACAINE HYDROCHLORIDE 7.5 MG/ML
INJECTION, SOLUTION INTRASPINAL
Status: COMPLETED
Start: 2018-06-11 | End: 2018-06-11

## 2018-06-11 RX ORDER — ACETAMINOPHEN 325 MG/1
650 TABLET ORAL EVERY 4 HOURS PRN
Status: DISCONTINUED | OUTPATIENT
Start: 2018-06-11 | End: 2018-06-13 | Stop reason: HOSPADM

## 2018-06-11 RX ORDER — DOCUSATE SODIUM 100 MG/1
100 CAPSULE, LIQUID FILLED ORAL 2 TIMES DAILY
Status: DISCONTINUED | OUTPATIENT
Start: 2018-06-11 | End: 2018-06-13 | Stop reason: HOSPADM

## 2018-06-11 RX ORDER — DIAPER,BRIEF,INFANT-TODD,DISP
1 EACH MISCELLANEOUS DAILY PRN
Status: DISCONTINUED | OUTPATIENT
Start: 2018-06-11 | End: 2018-06-13 | Stop reason: HOSPADM

## 2018-06-11 RX ORDER — OXYCODONE HYDROCHLORIDE AND ACETAMINOPHEN 5; 325 MG/1; MG/1
2 TABLET ORAL EVERY 4 HOURS PRN
Status: DISCONTINUED | OUTPATIENT
Start: 2018-06-12 | End: 2018-06-13 | Stop reason: HOSPADM

## 2018-06-11 RX ORDER — ALBUTEROL SULFATE 90 UG/1
2 AEROSOL, METERED RESPIRATORY (INHALATION) EVERY 4 HOURS PRN
Status: DISCONTINUED | OUTPATIENT
Start: 2018-06-11 | End: 2018-06-13 | Stop reason: HOSPADM

## 2018-06-11 RX ADMIN — OXYCODONE HYDROCHLORIDE AND ACETAMINOPHEN 1 TABLET: 5; 325 TABLET ORAL at 23:18

## 2018-06-11 RX ADMIN — SODIUM CHLORIDE, SODIUM LACTATE, POTASSIUM CHLORIDE, AND CALCIUM CHLORIDE 125 ML/HR: .6; .31; .03; .02 INJECTION, SOLUTION INTRAVENOUS at 11:15

## 2018-06-11 RX ADMIN — ONDANSETRON 4 MG: 2 INJECTION INTRAMUSCULAR; INTRAVENOUS at 14:46

## 2018-06-11 RX ADMIN — BUPIVACAINE HYDROCHLORIDE IN DEXTROSE 1.6 ML: 7.5 INJECTION, SOLUTION SUBARACHNOID at 07:36

## 2018-06-11 RX ADMIN — HYDROMORPHONE HYDROCHLORIDE 0.5 MG: 1 INJECTION, SOLUTION INTRAMUSCULAR; INTRAVENOUS; SUBCUTANEOUS at 19:52

## 2018-06-11 RX ADMIN — DOCUSATE SODIUM 100 MG: 100 CAPSULE, LIQUID FILLED ORAL at 19:52

## 2018-06-11 RX ADMIN — SODIUM CHLORIDE, SODIUM LACTATE, POTASSIUM CHLORIDE, AND CALCIUM CHLORIDE 125 ML/HR: .6; .31; .03; .02 INJECTION, SOLUTION INTRAVENOUS at 19:49

## 2018-06-11 RX ADMIN — SODIUM CHLORIDE, SODIUM LACTATE, POTASSIUM CHLORIDE, AND CALCIUM CHLORIDE: .6; .31; .03; .02 INJECTION, SOLUTION INTRAVENOUS at 08:05

## 2018-06-11 RX ADMIN — KETOROLAC TROMETHAMINE 15 MG: 30 INJECTION, SOLUTION INTRAMUSCULAR at 16:30

## 2018-06-11 RX ADMIN — Medication 250 MILLI-UNITS/MIN: at 08:02

## 2018-06-11 RX ADMIN — CEFAZOLIN SODIUM 2000 MG: 10 INJECTION, POWDER, FOR SOLUTION INTRAVENOUS at 07:34

## 2018-06-11 RX ADMIN — MORPHINE SULFATE 0.15 MG: 0.5 INJECTION, SOLUTION EPIDURAL; INTRATHECAL; INTRAVENOUS at 07:36

## 2018-06-11 RX ADMIN — ONDANSETRON HYDROCHLORIDE 4 MG: 2 INJECTION, SOLUTION INTRAVENOUS at 08:06

## 2018-06-11 RX ADMIN — SODIUM CHLORIDE, SODIUM LACTATE, POTASSIUM CHLORIDE, AND CALCIUM CHLORIDE 125 ML/HR: .6; .31; .03; .02 INJECTION, SOLUTION INTRAVENOUS at 00:17

## 2018-06-11 RX ADMIN — DEXAMETHASONE SODIUM PHOSPHATE 8 MG: 10 INJECTION, SOLUTION INTRAMUSCULAR; INTRAVENOUS at 16:30

## 2018-06-11 NOTE — OP NOTE
OPERATIVE REPORT  PATIENT NAME: Ida Camilo    :  1995  MRN: [id number]  Pt Location: [facility] L[facility] OR ROOM 01    SURGERY DATE: 2018    Surgeon(s) and Role:     * Ladarius Orlando DO - Primary     * Joel Tyson MD - Assisting     * Anika Castro DO - Assisting    Preop Diagnosis:  39 weeks gestation  Hx previous  section x2    Procedure(s) (LRB):   SECTION () REPEAT (N/A), low transverse    Specimen(s):  ID Type Source Tests Collected by Time Destination   A :  Tissue (Placenta on Hold) OB Only Placenta PLACENTA IN STORAGE Ladarius Orlando DO 2018    B :  Cord Blood Cord BLOOD GAS, VENOUS, CORD Ladarius Orlando DO 2018        Estimated Blood Loss:   700cc    Drains:  Urethral Catheter Double-lumen 16 Fr  (Active)   Number of days: 0       Anesthesia Type:   Spinal anesthesia    Operative Indications:  39 weeks of gestation  Hx previous  section x2    Operative Findings:  Viable female , weight 7lbs 1oz, APGARs of 8 (1 min) and 9 (5 min)  Intact placenta, manually expressed, 3 vessel cord  Clear amniotic fluid  Grossly normal appearing uterus  Grossly normal appearing fallopian tubes and ovaries bilaterally  Arterial cord pH unable to be obtained  Venous cord pH 7 391, base excess -2 6    Complications:   None    Procedure and Technique:  Ida Camilo is a 23yo G3 now P3 who presented to labor and delivery for a scheduled repeat  section  She presented the night before to be evaluated for anemia  She received 2u of PRBCs due to a depressed preoperative hemoglobin 8 2  Risks, benefits, possible complications, alternate treatment options, and expected outcomes were discussed with the patient  The patient agreed with the proposed plan and gave informed consent  The patient was taken to the operating room where she was properly identified to the OR staff and attending physician  She received spinal anesthesia by Dr Deny Cantu preoperatively  Fetal heart tones were appreciated and found to be appropriate  A Alvarenga catheter was aseptically inserted and SCDs were placed  The abdomen was prepped with Chloraprep and the vagina prepped with Betadine  Following appropriate drying time, the patient was draped in the usual sterile manner for a Pfannenstiel incision  The patient had received Ancef 2g IV pre-operatively for prophylaxis  A Time Out was held and the above information confirmed  The patient was identified as Herman Pale and the procedure verified as  Delivery  A Pfannenstiel incision was made and carried down through the underlying subcutaneous tissue to the fascia using a scalpel  Rectus fascia was then incised in the midline and extended laterally using Oliveira scissors  The superior edge of the fascia was grasped with Kocher clamps, tented upward, and the underlying muscle was bluntly and sharply dissected off  The inferior edge was grasped with Kocher clamps and cleared in similar fashion  All anatomic layers were well-demarcated  The rectus muscles were  and the peritoneum was identified and subsequently entered and extended longitudinally with blunt dissection  The vesicouterine peritoneum was identified and a bladder flap was created using Metzenbaum scissors  The Shan-O retractor was inserted  A low transverse uterine incision was made with the scalpel and extended laterally with blunt dissection  The amnion was entered bluntly  Surgeons hand was inserted through the hysterotomy and the fetal head was palpated, elevated, and delivered through the uterine incision with the assistance of fundal pressure  Baby had spontaneous cry with good color and tone  The umbilical cord was clamped and cut  The infant was handed off to the  providers  Venous cord gases, cord blood, and a segment of umbilical cord were obtained for evaluation and promptly sent to the lab   Arterial cord gasses were unable to be obtained  The placenta delivered spontaneously with uterine fundal massage and was noted to have a 3 vessel cord  This was sent to the lab for placental storage  The uterus was examined and a moist lap sponge was used to clear the cavity of clots and products of conception  The uterine incision was closed with a running locked suture of 0 Vicryl  A second layer of the same suture was used to imbricate the first   Good hemostasis was confirmed upon uterine closure  The Shan-O retractor was removed from the abdomen  The rectus abdominus was reapproximated using 2-0 Plain suture  The fascia was closed with a running suture of 0 Vicryl  Subcutaneous tissues were closed with 2-0 Plain suture  The skin was closed with 4-0 Monocryl in a subcuticular fashion  Histoacryl was applied and an abdominal binder was then placed  At the conclusion of the procedure, all needle, sponge, and instrument counts were noted to be correct x2  The patient tolerated the procedure well and was transferred to her the recovery room in stable condition  Dr Johnny Robertson was present and participated in all key portions of the case      Patient Disposition:  PACU  and hemodynamically stable    SIGNATURE: Wallace Han DO  DATE: June 11, 2018  TIME: 9:13 AM

## 2018-06-11 NOTE — PLAN OF CARE
Knowledge Deficit     Verbalizes understanding of labor plan Completed     Patient/family/caregiver demonstrates understanding of disease process, treatment plan, medications, and discharge instructions Completed        Labor & Delivery     Manages discomfort Completed     Patient vital signs are stable Completed          DISCHARGE PLANNING     Discharge to home or other facility with appropriate resources Progressing        INFECTION - ADULT     Absence or prevention of progression during hospitalization Progressing     Absence of fever/infection during neutropenic period Progressing        PAIN - ADULT     Verbalizes/displays adequate comfort level or baseline comfort level Progressing        POSTPARTUM     Experiences normal postpartum course Progressing     Appropriate maternal -  bonding Progressing     Establishment of infant feeding pattern Progressing     Incision(s), wounds(s) or drain site(s) healing without S/S of infection Progressing        SAFETY ADULT     Patient will remain free of falls Progressing     Maintain or return to baseline ADL function Progressing     Maintain or return mobility status to optimal level Progressing

## 2018-06-11 NOTE — ANESTHESIA PREPROCEDURE EVALUATION
Review of Systems/Medical History  Patient summary reviewed  Chart reviewed      Cardiovascular  Exercise tolerance (METS): >4,     Pulmonary  Asthma , well controlled/ stable Asthma type of rescue: PRN inhaler,        GI/Hepatic  Negative GI/hepatic ROS          Negative  ROS        Endo/Other  Negative endo/other ROS      GYN  Negative gynecology ROS          Hematology  Anemia anemia of chronic disease,     Musculoskeletal  Negative musculoskeletal ROS        Neurology  Negative neurology ROS      Psychology   Depression ,              Physical Exam    Airway    Mallampati score: II  TM Distance: <3 FB  Neck ROM: full     Dental       Cardiovascular  Rhythm: regular, Rate: normal,     Pulmonary  Breath sounds clear to auscultation,     Other Findings        Anesthesia Plan  ASA Score- 3     Anesthesia Type- spinal with ASA Monitors  Additional Monitors:   Airway Plan:         Plan Factors- Patient instructed to abstain from smoking on day of procedure  Patient did not smoke on day of surgery  Induction- intravenous  Postoperative Plan-     Informed Consent- Anesthetic plan and risks discussed with patient

## 2018-06-11 NOTE — ANESTHESIA PROCEDURE NOTES
Spinal Block    Start time: 6/11/2018 7:30 AM  End time: 6/11/2018 7:35 AM  Staffing  Anesthesiologist: Mony Andrade  Performed: anesthesiologist   Preanesthetic Checklist  Completed: patient identified, site marked, surgical consent, pre-op evaluation, timeout performed, IV checked, risks and benefits discussed and monitors and equipment checked  Spinal Block  Patient position: sitting  Prep: Betadine  Patient monitoring: heart rate, continuous pulse ox and frequent blood pressure checks  Approach: midline  Location: L3-4  Injection technique: single-shot  Needle  Needle type: pencil-tip   Needle gauge: 24 G  Assessment  Injection Assessment:  negative aspiration for heme, no paresthesia on injection and positive aspiration for clear CSF    Post-procedure:  site cleaned

## 2018-06-11 NOTE — H&P
History & Physical - OB/GYN   Lynn Navarrete 23 y o  female MRN: [id number]  Unit/Bed#: L&D 328-01 Encounter: [id number]     Lynn Navarrete is a patient of Dr David Sims      Chief complaint:  "I am here for a scheduled "     HPI:  Lynn Navarrete is a 23 y o   female with an IDA of 2018, at 39w0d weeks gestation who is being admitted for Elective  Section, Repeat  She is a patient of [facility]  This is her third   She has a history of intraoperative bleeding requiring blood transfusions and uterotonic agents  She does not remember how much blood she received, but records from [facility] show she received at least 1u of pRBCs during the time of her last   She is a recent  and was started on antidepressants 50mg Zoloft  She currently denies homicidal ideation and suicidal ideation  She is a chronic anemic with a Hb in the 8's  Her mother states she has a "mediterranean disease" but the patient's Hb electrophoresis was WNL  She is aware that her Hb is low and provides verbal consent to receive blood and blood products   Also discussed with patient's mother      Contractions:  none  Fetal movement:  present  Vaginal bleeding:   none  Leaking of fluid:  none     Pregnancy Complications:  Previous two pregnancies delivered by  with complication of blood transfusions; GBS+; history of gestational HTN x2     PMH:  Medical History        Past Medical History:   Diagnosis Date    Asthma        Depression     PSH:  Surgical History         Past Surgical History:   Procedure Laterality Date     SECTION                Social Hx:  Denies EtOH, cigarette, and recreational drug use in pregnancy; history of marijuana use - everyday for the past five years, stopped 2017 after finding out about pregnancy     OB Hx:               Obstetric History       T0      L0     SAB0   TAB0   Ectopic0   Multiple0   Live Births0        # Outcome Date GA Lbr Wai/2nd Weight Sex Delivery Anes PTL Lv   1 Current                               Meds:  No current facility-administered medications on file prior to encounter               Current Outpatient Prescriptions on File Prior to Encounter   Medication Sig Dispense Refill    acetaminophen (TYLENOL) 500 mg tablet Take 1,000 mg by mouth every 6 (six) hours as needed for mild pain        ondansetron (ZOFRAN) 4 mg tablet Take 1 tablet by mouth every 8 (eight) hours as needed for nausea or vomiting 10 tablet 0    Prenatal Vit-Fe Fumarate-FA (PRENATAL VITAMIN PLUS LOW IRON) 27-1 MG TABS Take 1 tablet by mouth daily 30 tablet 0         Allergies: Allergies   Allergen Reactions    Iron Dizziness         Review of Systems   All other systems reviewed and are negative      Physical Exam   Constitutional: She is oriented to person, place, and time  She appears well-developed and well-nourished  Cardiovascular: Normal rate and regular rhythm  Pulmonary/Chest: Effort normal    Abdominal: Soft  She exhibits no distension  There is no tenderness  Neurological: She is alert and oriented to person, place, and time  Cervix:  Not examined     Fetal heart rate:    120bpm/mod variability/no decelerations/reactive accelerations     [place]:    N/a     EFW: unknown - last previous 36w - 2917g, possibly 7 5lb by Leopold's     GBS: positive     Membranes: intact     Labs:  Hemoglobin: Hb 8 2  Blood type: A+  Antibody: negative  Group B strep: positive  HIV: negative  Hepatitis B: negative  RPR: nonreactive  Rubella: nonimmune  Varicella immune  1 hour Glucose: normal, 103     Assessment:   23 y o   at 39w0d presents for a scheduled  with two previous deliveries through  requiring transfusions [1u PRBC [2015]]     Plan:   1  IUP at 39w1d   - cEFM, tocometry   - Planned  section scheduled for 18  2  FEN   - IV LR for hydration   - NPO after midnight for surgery  3   Anemia   - Admission Hb 8 2   - T&C for 4u PRBCs   - Will give 2u of PRBCs pre-operatively overnight, consent signed  4  Anesthesia   - Spinal tomorrow  5  GBS positive   - Scheduled C/S will not need prophylaxis  6  Pre-op antibioitcs   - Ancef 2g  7  DVT ppx   - SCDS bilateral  8   Rubella non-immune   - MMR postpartum     D/w Dr Seth Forth

## 2018-06-11 NOTE — ANESTHESIA PREPROCEDURE EVALUATION
Review of Systems/Medical History  Patient summary reviewed  Chart reviewed      Cardiovascular  Negative cardio ROS Exercise tolerance (METS): >4,     Pulmonary  Asthma , well controlled/ stable ,        GI/Hepatic       Negative  ROS        Endo/Other  Negative endo/other ROS      GYN  Negative gynecology ROS          Hematology  Anemia anemia of chronic disease,     Musculoskeletal  Negative musculoskeletal ROS        Neurology  Negative neurology ROS      Psychology   Depression ,              Physical Exam    Airway    Mallampati score: I  TM Distance: <3 FB  Neck ROM: full     Dental       Cardiovascular  Comment: Negative ROS, Rhythm: regular, Rate: normal,     Pulmonary  Breath sounds clear to auscultation,     Other Findings        Anesthesia Plan  ASA Score- 3     Anesthesia Type- spinal with ASA Monitors  Additional Monitors:   Airway Plan:         Plan Factors- Patient instructed to abstain from smoking on day of procedure  Patient did not smoke on day of surgery  Induction- intravenous  Postoperative Plan- Plan for postoperative opioid use  Informed Consent- Anesthetic plan and risks discussed with patient

## 2018-06-12 LAB
ABO GROUP BLD BPU: NORMAL
ABO GROUP BLD BPU: NORMAL
BASOPHILS # BLD AUTO: 0 THOUSANDS/ΜL (ref 0–0.1)
BASOPHILS NFR BLD AUTO: 0 % (ref 0–1)
BPU ID: NORMAL
BPU ID: NORMAL
CROSSMATCH: NORMAL
CROSSMATCH: NORMAL
EOSINOPHIL # BLD AUTO: 0.01 THOUSAND/ΜL (ref 0–0.61)
EOSINOPHIL NFR BLD AUTO: 0 % (ref 0–6)
ERYTHROCYTE [DISTWIDTH] IN BLOOD BY AUTOMATED COUNT: 29.3 % (ref 11.6–15.1)
HCT VFR BLD AUTO: 31 % (ref 34.8–46.1)
HGB BLD-MCNC: 9.1 G/DL (ref 11.5–15.4)
LYMPHOCYTES # BLD AUTO: 1.23 THOUSANDS/ΜL (ref 0.6–4.47)
LYMPHOCYTES NFR BLD AUTO: 11 % (ref 14–44)
MCH RBC QN AUTO: 20.9 PG (ref 26.8–34.3)
MCHC RBC AUTO-ENTMCNC: 29.4 G/DL (ref 31.4–37.4)
MCV RBC AUTO: 71 FL (ref 82–98)
MONOCYTES # BLD AUTO: 0.38 THOUSAND/ΜL (ref 0.17–1.22)
MONOCYTES NFR BLD AUTO: 3 % (ref 4–12)
NEUTROPHILS # BLD AUTO: 9.91 THOUSANDS/ΜL (ref 1.85–7.62)
NEUTS SEG NFR BLD AUTO: 86 % (ref 43–75)
PLATELET # BLD AUTO: 220 THOUSANDS/UL (ref 149–390)
RBC # BLD AUTO: 4.35 MILLION/UL (ref 3.81–5.12)
UNIT DISPENSE STATUS: NORMAL
UNIT DISPENSE STATUS: NORMAL
UNIT PRODUCT CODE: NORMAL
UNIT PRODUCT CODE: NORMAL
UNIT RH: NORMAL
UNIT RH: NORMAL
WBC # BLD AUTO: 11.53 THOUSAND/UL (ref 4.31–10.16)

## 2018-06-12 PROCEDURE — 85025 COMPLETE CBC W/AUTO DIFF WBC: CPT | Performed by: OBSTETRICS & GYNECOLOGY

## 2018-06-12 PROCEDURE — 90715 TDAP VACCINE 7 YRS/> IM: CPT | Performed by: OBSTETRICS & GYNECOLOGY

## 2018-06-12 PROCEDURE — 99024 POSTOP FOLLOW-UP VISIT: CPT | Performed by: OBSTETRICS & GYNECOLOGY

## 2018-06-12 RX ADMIN — IBUPROFEN 600 MG: 600 TABLET ORAL at 22:16

## 2018-06-12 RX ADMIN — TETANUS TOXOID, REDUCED DIPHTHERIA TOXOID AND ACELLULAR PERTUSSIS VACCINE, ADSORBED 0.5 ML: 5; 2.5; 8; 8; 2.5 SUSPENSION INTRAMUSCULAR at 08:27

## 2018-06-12 RX ADMIN — Medication 1 TABLET: at 08:26

## 2018-06-12 RX ADMIN — SERTRALINE HYDROCHLORIDE 50 MG: 50 TABLET ORAL at 08:26

## 2018-06-12 RX ADMIN — KETOROLAC TROMETHAMINE 15 MG: 30 INJECTION, SOLUTION INTRAMUSCULAR at 08:27

## 2018-06-12 RX ADMIN — SIMETHICONE CHEW TAB 80 MG 80 MG: 80 TABLET ORAL at 22:23

## 2018-06-12 RX ADMIN — OXYCODONE HYDROCHLORIDE AND ACETAMINOPHEN 1 TABLET: 5; 325 TABLET ORAL at 04:45

## 2018-06-12 RX ADMIN — OXYCODONE HYDROCHLORIDE AND ACETAMINOPHEN 2 TABLET: 5; 325 TABLET ORAL at 22:22

## 2018-06-12 RX ADMIN — DOCUSATE SODIUM 100 MG: 100 CAPSULE, LIQUID FILLED ORAL at 08:27

## 2018-06-12 RX ADMIN — OXYCODONE HYDROCHLORIDE AND ACETAMINOPHEN 1 TABLET: 5; 325 TABLET ORAL at 13:42

## 2018-06-12 RX ADMIN — OXYCODONE HYDROCHLORIDE AND ACETAMINOPHEN 1 TABLET: 5; 325 TABLET ORAL at 18:26

## 2018-06-12 RX ADMIN — DOCUSATE SODIUM 100 MG: 100 CAPSULE, LIQUID FILLED ORAL at 18:23

## 2018-06-12 NOTE — PLAN OF CARE
Problem: POSTPARTUM  Goal: Experiences normal postpartum course  INTERVENTIONS:  - Monitor maternal vital signs  - Assess uterine involution and lochia   Outcome: Progressing    Goal: Appropriate maternal -  bonding  INTERVENTIONS:  - Identify family support  - Assess for appropriate maternal/infant bonding   -Encourage maternal/infant bonding opportunities  - Referral to  or  as needed   Outcome: Progressing    Goal: Establishment of infant feeding pattern  INTERVENTIONS:  - Assess breast/bottle feeding  - Refer to lactation as needed   Outcome: Progressing    Goal: Incision(s), wounds(s) or drain site(s) healing without S/S of infection  INTERVENTIONS  - Assess and document risk factors for skin impairment   - Assess and document dressing, incision, wound bed, drain sites and surrounding tissue  - Initiate Nutrition services consult and/or wound management as needed   Outcome: Progressing      Problem: PAIN - ADULT  Goal: Verbalizes/displays adequate comfort level or baseline comfort level  Interventions:  - Encourage patient to monitor pain and request assistance  - Assess pain using appropriate pain scale  - Administer analgesics based on type and severity of pain and evaluate response  - Implement non-pharmacological measures as appropriate and evaluate response  - Consider cultural and social influences on pain and pain management  - Notify physician/advanced practitioner if interventions unsuccessful or patient reports new pain   Outcome: Progressing      Problem: INFECTION - ADULT  Goal: Absence or prevention of progression during hospitalization  INTERVENTIONS:  - Assess and monitor for signs and symptoms of infection  - Monitor lab/diagnostic results  - Monitor all insertion sites, i e  indwelling lines, tubes, and drains  - Monitor endotracheal (as able) and nasal secretions for changes in amount and color  - [place] appropriate cooling/warming therapies per order  - Administer medications as ordered  - Instruct and encourage patient and family to use good hand hygiene technique  - Identify and instruct in appropriate isolation precautions for identified infection/condition   Outcome: Progressing    Goal: Absence of fever/infection during neutropenic period  INTERVENTIONS:  - Monitor WBC  - Implement neutropenic guidelines   Outcome: Completed Date Met: 06/12/18  No neutropenia    Problem: SAFETY ADULT  Goal: Patient will remain free of falls  INTERVENTIONS:  - Assess patient frequently for physical needs  -  Identify cognitive and physical deficits and behaviors that affect risk of falls    -  [place] fall precautions as indicated by assessment   - Educate patient/family on patient safety including physical limitations  - Instruct patient to call for assistance with activity based on assessment  - Modify environment to reduce risk of injury  - Consider OT/PT consult to assist with strengthening/mobility   Outcome: Progressing    Goal: Maintain or return to baseline ADL function  INTERVENTIONS:  -  Assess patient's ability to carry out ADLs; assess patient's baseline for ADL function and identify physical deficits which impact ability to perform ADLs (bathing, care of mouth/teeth, toileting, grooming, dressing, etc )  - Assess/evaluate cause of self-care deficits   - Assess range of motion  - Assess patient's mobility; develop plan if impaired  - Assess patient's need for assistive devices and provide as appropriate  - Encourage maximum independence but intervene and supervise when necessary  ¯ Involve family in performance of ADLs  ¯ Assess for home care needs following discharge   ¯ Request OT consult to assist with ADL evaluation and planning for discharge  ¯ Provide patient education as appropriate   Outcome: Progressing    Goal: Maintain or return mobility status to optimal level  INTERVENTIONS:  - Assess patient's baseline mobility status (ambulation, transfers, stairs, etc )    - Identify cognitive and physical deficits and behaviors that affect mobility  - Identify mobility aids required to assist with transfers and/or ambulation (gait belt, sit-to-stand, lift, walker, cane, etc )  - [place] fall precautions as indicated by assessment  - Record patient progress and toleration of activity level on Mobility SBAR; progress patient to next Phase/Stage  - Instruct patient to call for assistance with activity based on assessment  - Request Rehabilitation consult to assist with strengthening/weightbearing, etc    Outcome: Progressing      Problem: DISCHARGE PLANNING  Goal: Discharge to home or other facility with appropriate resources  INTERVENTIONS:  - Identify barriers to discharge w/patient and caregiver  - Arrange for needed discharge resources and transportation as appropriate  - Identify discharge learning needs (meds, wound care, etc )  - Arrange for interpretive services to assist at discharge as needed  - Refer to Case Management Department for coordinating discharge planning if the patient needs post-hospital services based on physician/advanced practitioner order or complex needs related to functional status, cognitive ability, or social support system   Outcome: Progressing

## 2018-06-12 NOTE — SOCIAL WORK
Consult received for MOB , hx of THC use  CM met with mom to address consult and discuss any discharge needs  MOB reports last THC use prior to pregnancy  UDS for mom and baby negative on admission  MOB reports being  18 months, FOB is not the  partner  FOB of this child is not involved in infant care  MOB lives with her mother who is identified as her primary support person at this time  She has a hx of depression  She has not sought out any counseling services in the past but reports that she would like to be set up with [facility] services prior to discharge to f/u outpatient  She is high risk PPD given social situation  She has not had any bereavement services since her prior partners passing, and we did not discuss how she was feeling today, however, she was tearful when we discussed [facility] services  CM called [facility] today and spoke with Viri Steele  In intake - set initial appointment for counseling for  at 2pm with Ms Lisa Macias, [address]  Mom agreeable to this date and time - will put further information in her AVS      MOB gave birth to a baby girl, Errol Wadsworth  FOB not identified or involved in infant care  MOB lives with her two children, ages 1 and 3 and her mother who is her primary support person  She reports having all necessary items for baby at discharge  PNC provided through [facility]  She plans to use [facility] for baby at discharge  She can walk to Ped appt from her home  For [address] appointment, she reports that her mother drives  SHe has [facility] services and has a set appointment for Friday, 6/15  She is formula feeding  Baby will be enrolled in [employer] and MOB knows to call within the first 30 days to avoid gaps in care  Mental Health follow up arranged  No further CM needs identified

## 2018-06-12 NOTE — DISCHARGE INSTR - APPOINTMENTS
[address]  [address] [place], [place] [address]  Paresh Parson U  47  [phone]  Appointment: Tuesday, June 19th at 2pm with Ms Fatuma Rhodes for an "IDI" Appointment - Please arrive 10 minutes early  You will be instructed to sign some paperwork, and to also sign into a tablet  If the tablet is not working correctly please notify the     Attendance is taken seriously - if you can not attend your first appointment please communicate with the practice to re-schedule  Please bring with you:   Your family physicians information (name, address, telephone number)  Your Insurance Card  Any information on your school or employer  Any history with any past [address] Ana JON 06/12/18 12:48 PM

## 2018-06-12 NOTE — PROGRESS NOTES
Progress Note - OB/GYN   Onur Parra 23 y o  female MRN: [id number]  Unit/Bed#: L&D 313-01 Encounter: [id number]    Assessment:  23 y o  [id number] s/p Repeat low transverse  section post-operative day 1   Chronic anemia   History of THC use,  UDS negative   rubella nonimmune    Plan:  1  Routine post-partum care   - adequate urine output overnight, Alvarenga removed this morning follow-up voiding trial  2  Chronic anemia   - hemoglobin on admission  8 2--> 2uPRBCs--> 9 1  3  Rubella nonimmune   - MMR ordered  4  Social issues: recently , history of THC use   - follow-up case management consult  5  Contraception   -Possibly Nexplanon  6  Anticipate discharge POD 3    Subjective/Objective   Chief Complaint:       Subjective:  Onur Parra is well appearing and has no complaints at this time  Pain: yes  Tolerating PO: yes  Voiding:  Not yet, Alvarenga removed this morning  Flatus: yes  BM: no  Ambulating: yes  Breastfeeding: no  Chest pain: no  Shortness of breath: no  Leg pain: no  Lochia:  minimal    Objective:     Vitals: Blood pressure 118/84, pulse 59, temperature 98 2 °F (36 8 °C), temperature source Oral, resp  rate 16, height 5' 2" (1 575 m), weight 82 6 kg (182 lb), last menstrual period 09/10/2017, SpO2 98 %, not currently breastfeeding      Intake/Output Summary (Last 24 hours) at 18 0853  Last data filed at 18 0500   Gross per 24 hour   Intake             1500 ml   Output             5350 ml   Net            -3850 ml       Physical Exam:     General: NAD  Cardiovascular: RRR, no murmur, nl S1/S2   Lungs: CTAB, non-labored breathing   Abdomen: Soft, no distension/rebound/guarding/tenderness   Fundus: Firm, non-tender, fundus: at the umbilicus   Incision: C/D/I, healing well and suture  Lower Extremities: Non-tender  Other: none    Lab, Imaging and other studies:     Recent Results (from the past 72 hour(s))   CBC    Collection Time: 06/10/18 11:58 PM   Result Value Ref Range WBC 9 60 4 31 - 10 16 Thousand/uL    RBC 4 11 3 81 - 5 12 Million/uL    Hemoglobin 8 2 (L) 11 5 - 15 4 g/dL    Hematocrit 28 8 (L) 34 8 - 46 1 %    MCV 70 (L) 82 - 98 fL    MCH 20 0 (L) 26 8 - 34 3 pg    MCHC 28 5 (L) 31 4 - 37 4 g/dL    RDW 26 3 (H) 11 6 - 15 1 %    Platelets 781 331 - 487 Thousands/uL   RPR    Collection Time: 06/10/18 11:58 PM   Result Value Ref Range    RPR Non-Reactive Non-Reactive   Rapid drug screen, urine    Collection Time: 06/10/18 11:58 PM   Result Value Ref Range    Amph/Meth UR Negative Negative    Barbiturate Ur Negative Negative    Benzodiazepine Urine Negative Negative    Cocaine Urine Negative Negative    Methadone Urine Negative Negative    Opiate Urine Negative Negative    PCP Ur Negative Negative    THC Urine Negative Negative   Type and screen    Collection Time: 06/11/18 12:06 AM   Result Value Ref Range    ABO Grouping A     Rh Factor Positive     Antibody Screen Negative     Specimen Expiration Date [id number]    Prepare RBC:Special Requirements: None; Has consent been obtained? Yes; Where is the Surgery Scheduled?  [facility], 3 Units    Collection Time: 06/11/18  1:56 AM   Result Value Ref Range    Unit Product Code R9194V44     Unit Number [id number]     Unit ABO A     Unit [facility] POS     Crossmatch Compatible     Unit Dispense Status Crossmatched     Unit Product Code B1453F40     Unit Number [id number]     Unit ABO A     Unit [facility] POS     Crossmatch Compatible     Unit Dispense Status Crossmatched     Unit Product Code A9538V38     Unit Number [id number]     Unit ABO A     Unit [facility] POS     Crossmatch Compatible     Unit Dispense Status Crossmatched     Unit Product Code E2638B99     Unit Number [id number]     Unit ABO A     Unit [facility] POS     Crossmatch Compatible     Unit Dispense Status Crossmatched    Blood gas, venous, cord    Collection Time: 06/11/18  7:13 AM   Result Value Ref Range    pH, Cord Roly 7 391 7 190 - 7 490    pCO2, Cord Roly 36 6 27 0 - 43 0 mm HG    pO2, Cord Roly 38 8 15 0 - 45 0 mm HG    HCO3, Cord Roly 21 7 12 2 - 28 6 mmol/L    Base Exc, Cord Roly -2 6 (L) 1 0 - 9 0 mmol/L    O2 Cont, Cord Roly 19 6 mL/dL    O2 HGB,VENOUS CORD 84 2 %   CBC and differential    Collection Time: 06/12/18  5:10 AM   Result Value Ref Range    WBC 11 53 (H) 4 31 - 10 16 Thousand/uL    RBC 4 35 3 81 - 5 12 Million/uL    Hemoglobin 9 1 (L) 11 5 - 15 4 g/dL    Hematocrit 31 0 (L) 34 8 - 46 1 %    MCV 71 (L) 82 - 98 fL    MCH 20 9 (L) 26 8 - 34 3 pg    MCHC 29 4 (L) 31 4 - 37 4 g/dL    RDW 29 3 (H) 11 6 - 15 1 %    Platelets 786 129 - 225 Thousands/uL    Neutrophils Relative 86 (H) 43 - 75 %    Lymphocytes Relative 11 (L) 14 - 44 %    Monocytes Relative 3 (L) 4 - 12 %    Eosinophils Relative 0 0 - 6 %    Basophils Relative 0 0 - 1 %    Neutrophils Absolute 9 91 (H) 1 85 - 7 62 Thousands/µL    Lymphocytes Absolute 1 23 0 60 - 4 47 Thousands/µL    Monocytes Absolute 0 38 0 17 - 1 22 Thousand/µL    Eosinophils Absolute 0 01 0 00 - 0 61 Thousand/µL    Basophils Absolute 0 00 0 00 - 0 10 Thousands/µL   Prepare RBC:Special Requirements: None; Has consent been obtained? Yes; Date of Surgery: 6/11/2018 (0730); Where is the Surgery Scheduled?  [facility], 2 Units    Collection Time: 06/12/18  5:30 AM   Result Value Ref Range    Unit Product Code T5736V75     Unit Number [id number]     Unit ABO A     Unit [facility] POS     Crossmatch Compatible     Unit Dispense Status Presumed Trans     Unit Product Code E6755I34     Unit Number [id number]     Unit ABO A     Unit RH POS     Crossmatch Compatible     Unit Dispense Status Presumed Trans      Meds:    docusate sodium 100 mg Oral BID   prenatal multivitamin 1 tablet Oral Daily   sertraline 50 mg Oral Daily       acetaminophen 650 mg Q4H PRN   albuterol 2 puff Q4H PRN   dexamethasone 8 mg Once PRN   diphenhydrAMINE 25 mg Q6H PRN   hydrocortisone 1 application Daily PRN   HYDROmorphone 0 5 mg Q3H PRN   ibuprofen 600 mg Q6H PRN ketorolac 15 mg Q6H PRN   measles-mumps-rubella 0 5 mL Prior to discharge   ondansetron 4 mg Q4H PRN   ondansetron 4 mg Q8H PRN   oxyCODONE-acetaminophen 1 tablet Q4H PRN   oxyCODONE-acetaminophen 1 tablet Q4H PRN   oxyCODONE-acetaminophen 2 tablet Q4H PRN   simethicone 80 mg 4x Daily PRN   tetanus-diphtheria-acellular pertussis 0 5 mL Prior to discharge   witch hazel-glycerin 1 pad Q4H PRN             Signature / Title: Cassidy Posadas MD, Ob/Gyn, PGY-2  Date: 6/12/2018  Time: 6:09 AM

## 2018-06-12 NOTE — PLAN OF CARE
Problem: POSTPARTUM  Goal: Experiences normal postpartum course  INTERVENTIONS:  - Monitor maternal vital signs  - Assess uterine involution and lochia   Outcome: Progressing    Goal: Appropriate maternal -  bonding  INTERVENTIONS:  - Identify family support  - Assess for appropriate maternal/infant bonding   -Encourage maternal/infant bonding opportunities  - Referral to  or  as needed   Outcome: Progressing    Goal: Establishment of infant feeding pattern  INTERVENTIONS:  - Assess breast/bottle feeding  - Refer to lactation as needed   Outcome: Progressing    Goal: Incision(s), wounds(s) or drain site(s) healing without S/S of infection  INTERVENTIONS  - Assess and document risk factors for skin impairment   - Assess and document dressing, incision, wound bed, drain sites and surrounding tissue  - Initiate Nutrition services consult and/or wound management as needed   Outcome: Progressing      Problem: PAIN - ADULT  Goal: Verbalizes/displays adequate comfort level or baseline comfort level  Interventions:  - Encourage patient to monitor pain and request assistance  - Assess pain using appropriate pain scale  - Administer analgesics based on type and severity of pain and evaluate response  - Implement non-pharmacological measures as appropriate and evaluate response  - Consider cultural and social influences on pain and pain management  - Notify physician/advanced practitioner if interventions unsuccessful or patient reports new pain   Outcome: Progressing      Problem: INFECTION - ADULT  Goal: Absence or prevention of progression during hospitalization  INTERVENTIONS:  - Assess and monitor for signs and symptoms of infection  - Monitor lab/diagnostic results  - Monitor all insertion sites, i e  indwelling lines, tubes, and drains  - Monitor endotracheal (as able) and nasal secretions for changes in amount and color  - [place] appropriate cooling/warming therapies per order  - Administer medications as ordered  - Instruct and encourage patient and family to use good hand hygiene technique  - Identify and instruct in appropriate isolation precautions for identified infection/condition   Outcome: Progressing    Goal: Absence of fever/infection during neutropenic period  INTERVENTIONS:  - Monitor WBC  - Implement neutropenic guidelines   Outcome: Progressing      Problem: SAFETY ADULT  Goal: Patient will remain free of falls  INTERVENTIONS:  - Assess patient frequently for physical needs  -  Identify cognitive and physical deficits and behaviors that affect risk of falls    -  [place] fall precautions as indicated by assessment   - Educate patient/family on patient safety including physical limitations  - Instruct patient to call for assistance with activity based on assessment  - Modify environment to reduce risk of injury  - Consider OT/PT consult to assist with strengthening/mobility   Outcome: Progressing    Goal: Maintain or return to baseline ADL function  INTERVENTIONS:  -  Assess patient's ability to carry out ADLs; assess patient's baseline for ADL function and identify physical deficits which impact ability to perform ADLs (bathing, care of mouth/teeth, toileting, grooming, dressing, etc )  - Assess/evaluate cause of self-care deficits   - Assess range of motion  - Assess patient's mobility; develop plan if impaired  - Assess patient's need for assistive devices and provide as appropriate  - Encourage maximum independence but intervene and supervise when necessary  ¯ Involve family in performance of ADLs  ¯ Assess for home care needs following discharge   ¯ Request OT consult to assist with ADL evaluation and planning for discharge  ¯ Provide patient education as appropriate   Outcome: Progressing    Goal: Maintain or return mobility status to optimal level  INTERVENTIONS:  - Assess patient's baseline mobility status (ambulation, transfers, stairs, etc )    - Identify cognitive and physical deficits and behaviors that affect mobility  - Identify mobility aids required to assist with transfers and/or ambulation (gait belt, sit-to-stand, lift, walker, cane, etc )  - [place] fall precautions as indicated by assessment  - Record patient progress and toleration of activity level on Mobility SBAR; progress patient to next Phase/Stage  - Instruct patient to call for assistance with activity based on assessment  - Request Rehabilitation consult to assist with strengthening/weightbearing, etc    Outcome: Progressing      Problem: DISCHARGE PLANNING  Goal: Discharge to home or other facility with appropriate resources  INTERVENTIONS:  - Identify barriers to discharge w/patient and caregiver  - Arrange for needed discharge resources and transportation as appropriate  - Identify discharge learning needs (meds, wound care, etc )  - Arrange for interpretive services to assist at discharge as needed  - Refer to Case Management Department for coordinating discharge planning if the patient needs post-hospital services based on physician/advanced practitioner order or complex needs related to functional status, cognitive ability, or social support system   Outcome: Progressing

## 2018-06-12 NOTE — PLAN OF CARE
Problem: DISCHARGE PLANNING - CARE MANAGEMENT  Goal: Discharge to post-acute care or home with appropriate resources  INTERVENTIONS:  - Conduct assessment to determine patient/family and health care team treatment goals, and need for post-acute services based on payer coverage, community resources, and patient preferences, and barriers to discharge  - Address psychosocial, clinical, and financial barriers to discharge as identified in assessment in conjunction with the patient/family and health care team  - Arrange appropriate level of post-acute services according to patients   needs and preference and payer coverage in collaboration with the physician and health care team  - Communicate with and update the patient/family, physician, and health care team regarding progress on the discharge plan  - Arrange appropriate transportation to post-acute venues  Outcome: Completed Date Met: 06/12/18  Mental Health follow up set up for outpatient - [facility]  First appointment 6/19/2018 at 2pm at the [facility] practice

## 2018-06-13 VITALS
OXYGEN SATURATION: 95 % | HEART RATE: 68 BPM | BODY MASS INDEX: 33.49 KG/M2 | RESPIRATION RATE: 18 BRPM | TEMPERATURE: 98.6 F | WEIGHT: 182 LBS | DIASTOLIC BLOOD PRESSURE: 99 MMHG | SYSTOLIC BLOOD PRESSURE: 124 MMHG | HEIGHT: 62 IN

## 2018-06-13 PROCEDURE — 90707 MMR VACCINE SC: CPT | Performed by: OBSTETRICS & GYNECOLOGY

## 2018-06-13 PROCEDURE — 99024 POSTOP FOLLOW-UP VISIT: CPT | Performed by: OBSTETRICS & GYNECOLOGY

## 2018-06-13 RX ORDER — OXYCODONE HYDROCHLORIDE AND ACETAMINOPHEN 5; 325 MG/1; MG/1
1 TABLET ORAL EVERY 4 HOURS PRN
Qty: 15 TABLET | Refills: 0 | Status: SHIPPED | OUTPATIENT
Start: 2018-06-13 | End: 2018-06-23

## 2018-06-13 RX ORDER — ACETAMINOPHEN 325 MG/1
650 TABLET ORAL EVERY 4 HOURS PRN
Qty: 30 TABLET | Refills: 0 | Status: SHIPPED | OUTPATIENT
Start: 2018-06-13 | End: 2018-07-17 | Stop reason: ALTCHOICE

## 2018-06-13 RX ORDER — ACETAMINOPHEN 325 MG/1
650 TABLET ORAL EVERY 4 HOURS PRN
Qty: 30 TABLET | Refills: 0 | Status: SHIPPED | OUTPATIENT
Start: 2018-06-13 | End: 2018-06-13

## 2018-06-13 RX ORDER — DOCUSATE SODIUM 100 MG/1
100 CAPSULE, LIQUID FILLED ORAL 2 TIMES DAILY PRN
Qty: 10 CAPSULE | Refills: 0 | Status: SHIPPED | OUTPATIENT
Start: 2018-06-13 | End: 2018-07-17 | Stop reason: ALTCHOICE

## 2018-06-13 RX ORDER — IBUPROFEN 200 MG
600 TABLET ORAL EVERY 6 HOURS PRN
Qty: 30 TABLET | Refills: 0 | Status: SHIPPED | OUTPATIENT
Start: 2018-06-13 | End: 2018-12-14

## 2018-06-13 RX ORDER — OXYCODONE HYDROCHLORIDE AND ACETAMINOPHEN 5; 325 MG/1; MG/1
1 TABLET ORAL EVERY 4 HOURS PRN
Qty: 15 TABLET | Refills: 0 | Status: SHIPPED | OUTPATIENT
Start: 2018-06-13 | End: 2018-06-13

## 2018-06-13 RX ORDER — DOCUSATE SODIUM 100 MG/1
100 CAPSULE, LIQUID FILLED ORAL 2 TIMES DAILY PRN
Qty: 10 CAPSULE | Refills: 0 | Status: SHIPPED | OUTPATIENT
Start: 2018-06-13 | End: 2018-06-13

## 2018-06-13 RX ORDER — IBUPROFEN 200 MG
600 TABLET ORAL EVERY 6 HOURS PRN
Qty: 30 TABLET | Refills: 0 | Status: SHIPPED | OUTPATIENT
Start: 2018-06-13 | End: 2018-06-13

## 2018-06-13 RX ADMIN — MEASLES, MUMPS, AND RUBELLA VIRUS VACCINE LIVE 0.5 ML: 1000; 12500; 1000 INJECTION, POWDER, LYOPHILIZED, FOR SUSPENSION SUBCUTANEOUS at 11:49

## 2018-06-13 RX ADMIN — SERTRALINE HYDROCHLORIDE 50 MG: 50 TABLET ORAL at 08:05

## 2018-06-13 RX ADMIN — OXYCODONE HYDROCHLORIDE AND ACETAMINOPHEN 2 TABLET: 5; 325 TABLET ORAL at 02:27

## 2018-06-13 RX ADMIN — DOCUSATE SODIUM 100 MG: 100 CAPSULE, LIQUID FILLED ORAL at 08:05

## 2018-06-13 RX ADMIN — Medication 1 TABLET: at 08:06

## 2018-06-13 RX ADMIN — OXYCODONE HYDROCHLORIDE AND ACETAMINOPHEN 2 TABLET: 5; 325 TABLET ORAL at 08:07

## 2018-06-13 NOTE — DISCHARGE SUMMARY
CS Discharge Summary - Michael Rivas 23 y o  female MRN: [id number]    Unit/Bed#: L&D 987-76 Encounter: [id number]    Admission Date: 6/10/2018     Discharge Date: 2018    Admitting Diagnosis:   1  IUP at 39w0d for scheduled RTLCS  2  Previous C - section x 2  3  Chronic anemia  4  GBS positive  5  Rubella non immune  6  History of THC use     Discharge Diagnosis:   Same, delivered, s/p RLTCS x 3    Procedures:   repeat  section, low transverse incision    Admitting Attending: Dr Will Unger  Delivery Attending: Dr Tommy Ulrich  Discharge Attending: Dr Bruce Valdez service: none  Consult attending: none    Hospital Course:     Michael Rivas is a 23 y o   who was admitted at 39w0d for elective C section  Past obstetric history was significant for LTCSx2 and history of intraoperative bleeding requiring blood transfusions and uterotonic agents  PMH significant for depression and chronic anemia  Hb electrophoresis was WNL  Pregnancy complicated by GBS + status    Pre operative hemoglobin was 8 2 and she received 2 U PRBC  She underwent an uncomplicated  section on 2018 with EBL 700cc   was transferred to  nursery  Had APGARS of 8 and 9 at 1 and 5 minutes  Patient tolerated the procedure well and was transferred to recovery in stable condition  Her post-operative course was uncomplicated  Preoperative hemaglobin was 8 2, postoperative was 9 1 after the 2 U PRBC  Her postoperative pain was well controlled with oral analgesics    On day of discharge, she was ambulating and able to reasonably perform all ADLs  She was voiding and had appropriate bowel function  Pain was well controlled  She was discharged home on post-operative day #2 without complications  Patient declined progestin contraception at present and was advised to abstain until IUD is placed   Patient was instructed to follow up with her OB for post op check at [facility] and also advised to make 3 week post partum visit  She was given appropriate warnings to call provider if she develops signs of infection or uncontrolled pain  Complications:   None    Condition at discharge:   stable     Provisions for Follow-Up Care:  See after visit summary for information related to follow-up care and any pertinent home health orders  Disposition:   Home    Planned Readmission:   No    Discharge Medications:   Prenatal vitamin daily for 6 months or the duration of nursing whichever is longer  Motrin 600 mg orally every 6 hours as needed for pain  Tylenol (over the counter) per bottle directions as needed for pain  Hydrocortisone cream 1% (over the counter) applied 1-2x daily to hemorrhoids as needed  Witch hazel pads for hemorrhoidal discomfort as needed      Discharge instructions :   -Do not place anything (no partner, tampons or douche) in your vagina for 6 weeks  -You may walk for exercise for the first 6 weeks then gradually return to your usual activities    -Please do not drive for 1 week if you have no stitches and for 2 weeks if you have stitches or underwent a  delivery     -You may take baths or shower per your preference    -Please look at your bust (breasts) in the mirror daily and call provider for redness or tenderness or increased warmth  - If you have had a  please look at your incision daily as well and call provider for increasing redness or steady drainage from the incision    -Please call your provider if temperature > 100 4*F or 38* C, worsening pain or a foul discharge        Maria Eugenia Willett [place]   2018

## 2018-06-13 NOTE — PROGRESS NOTES
Progress Note - OB/GYN   Miguelina Bowser 23 y o  female MRN: [id number]  Unit/Bed#: L&D 313-01 Encounter: [id number]      Miguelina Bowser is a patient of [facility]    Assessment:  Post operative day #2 s/p  RLTCS x3, stable, and doing well today  She has not required blood post-operatively after receiving 2 u of PRBCs prior to her surgery on the morning of 6/11/18  Plan:  1  Chronic Anemia   - Starting Hb 8 2   - Recommend iron and colace upon discharge tomorrow  2  Hemodynamically stable   - Pre-op Hb 8 2 --> post-op Hb 9 1   - Vitals WNL, currently asymptomatic  3  [place] catheter   - Removed   - Making adequate urine output independently  4  Continue routine post partum care   - Encourage ambulation   - Encourage breastfeeding  5  Continue current meds   - See list below   - Pain adequately controlled with PO analgesics  6  Future contraception    - Pt is unsure of what she would like for contraception at this point in time   - Will readdress this tomorrow prior to D/C  7  CM consult   -UDS neg for THC, cleared for discharge  8  Rubella non-immune   - MMR ordered prior to discharge  9  Disposition   - Stable   - Anticipate discharge home tomorrow      Subjective/Objective     Chief Complaint:     Post operative day #2 from a RLTCS with no complaints today    Subjective:     Pain: no  Tolerating Oral Intake: yes  Voiding: yes  Flatus: yes  Bowel Movement: no  Ambulating: yes  Breastfeeding: Bottle feeding  Chest Pain: no  Shortness of Breath: no  Leg Pain/Discomfort: no  Lochia: minimal    Objective:   Vitals: /82 (BP Location: Left arm)   Pulse 74   Temp 98 4 °F (36 9 °C) (Oral)   Resp 18   Ht 5' 2" (1 575 m)   Wt 82 6 kg (182 lb)   LMP 09/10/2017   SpO2 95%   Breastfeeding?  No   BMI 33 29 kg/m²       Intake/Output Summary (Last 24 hours) at 06/13/18 0607  Last data filed at 06/12/18 2230   Gross per 24 hour   Intake                0 ml   Output             1500 ml   Net            -1500 ml       Lab Results   Component Value Date    WBC 11 53 (H) 06/12/2018    HGB 9 1 (L) 06/12/2018    HCT 31 0 (L) 06/12/2018    MCV 71 (L) 06/12/2018     06/12/2018       Meds/Allergies   Current Facility-Administered Medications   Medication Dose Route Frequency    acetaminophen (TYLENOL) tablet 650 mg  650 mg Oral Q4H PRN    albuterol (PROVENTIL HFA,VENTOLIN HFA) inhaler 2 puff  2 puff Inhalation Q4H PRN    diphenhydrAMINE (BENADRYL) injection 25 mg  25 mg Intravenous Q6H PRN    docusate sodium (COLACE) capsule 100 mg  100 mg Oral BID    hydrocortisone 1 % cream 1 application  1 application Topical Daily PRN    HYDROmorphone (DILAUDID) injection 0 5 mg  0 5 mg Intravenous Q3H PRN    ibuprofen (MOTRIN) tablet 600 mg  600 mg Oral Q6H PRN    ketorolac (TORADOL) injection 15 mg  15 mg Intravenous Q6H PRN    lactated ringers infusion  125 mL/hr Intravenous Continuous    lactated ringers infusion  125 mL/hr Intravenous Continuous    measles-mumps-rubella (M-M-R II) subcutaneous injection 0 5 mL  0 5 mL Subcutaneous Prior to discharge    ondansetron (ZOFRAN) injection 4 mg  4 mg Intravenous Q8H PRN    oxyCODONE-acetaminophen (PERCOCET) 5-325 mg per tablet 1 tablet  1 tablet Oral Q4H PRN    oxyCODONE-acetaminophen (PERCOCET) 5-325 mg per tablet 1 tablet  1 tablet Oral Q4H PRN    oxyCODONE-acetaminophen (PERCOCET) 5-325 mg per tablet 2 tablet  2 tablet Oral Q4H PRN    prenatal multivitamin tablet 1 tablet  1 tablet Oral Daily    sertraline (ZOLOFT) tablet 50 mg  50 mg Oral Daily    simethicone (MYLICON) chewable tablet 80 mg  80 mg Oral 4x Daily PRN    witch hazel-glycerin (TUCKS) topical pad 1 pad  1 pad Topical Q4H PRN       Physical Exam:  General: in no apparent distress, well developed and well nourished and non-toxic  Lungs: non-labored breathing  Abdomen: abdomen is soft without significant tenderness, masses, organomegaly or guarding; incision c/d/i closed with running absorbable suture  Fundus: Firm and non-tender, 1 cm below the umbilicus  Lower extremeties: nontender    Labs/Tests:   No results found for this or any previous visit (from the past 24 hour(s))      MEDS:   Current Facility-Administered Medications   Medication Dose Route Frequency    acetaminophen (TYLENOL) tablet 650 mg  650 mg Oral Q4H PRN    albuterol (PROVENTIL HFA,VENTOLIN HFA) inhaler 2 puff  2 puff Inhalation Q4H PRN    diphenhydrAMINE (BENADRYL) injection 25 mg  25 mg Intravenous Q6H PRN    docusate sodium (COLACE) capsule 100 mg  100 mg Oral BID    hydrocortisone 1 % cream 1 application  1 application Topical Daily PRN    HYDROmorphone (DILAUDID) injection 0 5 mg  0 5 mg Intravenous Q3H PRN    ibuprofen (MOTRIN) tablet 600 mg  600 mg Oral Q6H PRN    ketorolac (TORADOL) injection 15 mg  15 mg Intravenous Q6H PRN    lactated ringers infusion  125 mL/hr Intravenous Continuous    lactated ringers infusion  125 mL/hr Intravenous Continuous    measles-mumps-rubella (M-M-R II) subcutaneous injection 0 5 mL  0 5 mL Subcutaneous Prior to discharge    ondansetron (ZOFRAN) injection 4 mg  4 mg Intravenous Q8H PRN    oxyCODONE-acetaminophen (PERCOCET) 5-325 mg per tablet 1 tablet  1 tablet Oral Q4H PRN    oxyCODONE-acetaminophen (PERCOCET) 5-325 mg per tablet 1 tablet  1 tablet Oral Q4H PRN    oxyCODONE-acetaminophen (PERCOCET) 5-325 mg per tablet 2 tablet  2 tablet Oral Q4H PRN    prenatal multivitamin tablet 1 tablet  1 tablet Oral Daily    sertraline (ZOLOFT) tablet 50 mg  50 mg Oral Daily    simethicone (MYLICON) chewable tablet 80 mg  80 mg Oral 4x Daily PRN    witch hazel-glycerin (TUCKS) topical pad 1 pad  1 pad Topical Q4H PRN     Invasive Devices     Peripheral Intravenous Line            Peripheral IV 06/11/18 Right Wrist 2 days                Bob Yepez DO  PGY-1 OB/GYN   6/13/2018 6:07 AM

## 2018-06-13 NOTE — CASE MANAGEMENT
Notification of Maternity Inpatient Admission/Maternity Inpatient Authorization Request  This is a Notification of Maternity Inpatient Admission/Maternity Inpatient Authorization Request to our facility [facility]  Please be advised that this patient is currently in our facility under Inpatient Status  Below you will find the Birth/[place] Summary, Attending Physician and Facilitys information including NPI# and contact for the Utilization  assigned to the [facility] where the patient is receiving services  Please feel free to contact the Utilization Review Department with any questions  Mothers Information:  Ladonna Ram  MRN: [id number]  YOB: 1995  Admission Date: 6/10/2018  9:24 PM  Discharge Date: 2018  1:34 PM  Disposition: Home/Self Care  Admitting Diagnosis:   O82  DELIVERY  [place] Information:  Estimated Date of Delivery: 18  Information for the patient's :  Danica Huang Girl  [facility]) [[id number]]      Delivery Information:  Sex: female  Delivered 2018 8:01 AM by , Low Transverse; Gestational Age: 36w3d    [place] Measurements:  Weight: 7 lb 1 oz (3204 g); Height: 21"    APGAR 1 minute 5 minutes 10 minutes   Totals: 8 9      OB History      Para Term  AB Living    3 3 3     3    SAB TAB Ectopic Multiple Live Births          0 3        Attending Physician:  ARCELIA Villalobos  Specialty- Obstetrics and Gynecology  [facility] ID- [id number]  [id number] [facility] [address] [facility], [address] [place] [address]  Phone 1: [phone]  Fax: [phone]    Facility: [place] [address] [facility]  [phone]  Tax ID: [id number]  NPI: [id number]    [address] [facility] in the [employer] by [employer] for 2017  Network Utilization Review Department  Phone: [phone];  Fax [phone]  ATTENTION: The Network Utilization Review Department is now centralized for our 7 Facilities  Make a note that we have a new phone and fax numbers for our Department  Please call with any questions or concerns to [phone] and carefully follow the prompts so that you are directed to the right person  All voicemails are confidential  Fax any determinations, approvals, denials, and requests for initial or continue stay review clinical to [phone]  **Due to HIGH CALL volume, it would be easier if you could please send daily logs  This will expedite your requests and in part, help us provide discharge notifications faster   **

## 2018-06-15 LAB
ABO GROUP BLD BPU: NORMAL
BPU ID: NORMAL
CROSSMATCH: NORMAL
UNIT DISPENSE STATUS: NORMAL
UNIT PRODUCT CODE: NORMAL
UNIT RH: NORMAL

## 2018-06-19 LAB — PLACENTA IN STORAGE: NORMAL

## 2018-07-17 ENCOUNTER — HOSPITAL ENCOUNTER (EMERGENCY)
Facility: HOSPITAL | Age: 23
Discharge: HOME | End: 2018-07-17
Attending: EMERGENCY MEDICINE
Payer: COMMERCIAL

## 2018-07-17 ENCOUNTER — APPOINTMENT (EMERGENCY)
Dept: RADIOLOGY | Facility: HOSPITAL | Age: 23
End: 2018-07-17
Payer: COMMERCIAL

## 2018-07-17 VITALS
HEART RATE: 100 BPM | DIASTOLIC BLOOD PRESSURE: 73 MMHG | TEMPERATURE: 98.2 F | OXYGEN SATURATION: 98 % | WEIGHT: 174 LBS | BODY MASS INDEX: 31.83 KG/M2 | RESPIRATION RATE: 20 BRPM | SYSTOLIC BLOOD PRESSURE: 135 MMHG

## 2018-07-17 DIAGNOSIS — J45.901 ASTHMA EXACERBATION: Primary | ICD-10-CM

## 2018-07-17 PROCEDURE — 71046 X-RAY EXAM CHEST 2 VIEWS: CPT

## 2018-07-17 PROCEDURE — 99284 EMERGENCY DEPT VISIT MOD MDM: CPT

## 2018-07-17 PROCEDURE — 94640 AIRWAY INHALATION TREATMENT: CPT

## 2018-07-17 RX ORDER — ALBUTEROL SULFATE 2.5 MG/3ML
5 SOLUTION RESPIRATORY (INHALATION) ONCE
Status: COMPLETED | OUTPATIENT
Start: 2018-07-17 | End: 2018-07-17

## 2018-07-17 RX ORDER — ALBUTEROL SULFATE 90 UG/1
2 AEROSOL, METERED RESPIRATORY (INHALATION) EVERY 6 HOURS PRN
Qty: 8 G | Refills: 0 | Status: SHIPPED | OUTPATIENT
Start: 2018-07-17 | End: 2020-01-23 | Stop reason: ALTCHOICE

## 2018-07-17 RX ORDER — PREDNISONE 10 MG/1
TABLET ORAL
Qty: 20 TABLET | Refills: 0 | Status: SHIPPED | OUTPATIENT
Start: 2018-07-17 | End: 2020-01-23 | Stop reason: ALTCHOICE

## 2018-07-17 RX ORDER — AZITHROMYCIN 250 MG/1
TABLET, FILM COATED ORAL
Qty: 6 TABLET | Refills: 0 | Status: SHIPPED | OUTPATIENT
Start: 2018-07-17 | End: 2018-07-22

## 2018-07-17 RX ADMIN — ALBUTEROL SULFATE 5 MG: 2.5 SOLUTION RESPIRATORY (INHALATION) at 17:35

## 2018-07-17 RX ADMIN — IPRATROPIUM BROMIDE 0.5 MG: 0.5 SOLUTION RESPIRATORY (INHALATION) at 17:35

## 2018-07-17 NOTE — ED PROVIDER NOTES
History  Chief Complaint   Patient presents with    Shortness of Breath     Reports sob, b/l ear pain, and difficulty breathing for the past three days  23y  o female with PMH of asthma presents to the ER for dyspnea for 3 days  Patient states "it's my asthma"  Patient states she was using her albuterol inhaler for symptoms but she ran out  Symptoms are constant  She denies sick contacts or recent travel  Patient recently had a  in   Patient states she had to be transfused prior to her  due to low iron  She denies long plane/car rides, birth control use, calf pain, history of DVT/cancer  Associated symptoms: rhinorrhea and cough  She denies fever, chills, chest pain, N/V/D, abdominal pain, weakness or paresthesias  History provided by:  Patient   used: No        Prior to Admission Medications   Prescriptions Last Dose Informant Patient Reported? Taking? albuterol (PROVENTIL HFA,VENTOLIN HFA) 90 mcg/act inhaler More than a month at Unknown time Self Yes No   Sig: Inhale 2 puffs every 4 (four) hours as needed for wheezing or shortness of breath   ibuprofen (MOTRIN) 200 mg tablet 2018 at Unknown time  No Yes   Sig: Take 3 tablets (600 mg total) by mouth every 6 (six) hours as needed for moderate pain      Facility-Administered Medications: None       Past Medical History:   Diagnosis Date    Asthma        Past Surgical History:   Procedure Laterality Date     SECTION      [place]  DELIVERY ONLY N/A 2018    Procedure:  SECTION () REPEAT;  Surgeon: Cristi Aleman DO;  Location: [facility];  Service: Obstetrics       Family History   Problem Relation Age of Onset    No Known Problems Mother     No Known Problems Father     No Known Problems Sister     No Known Problems Brother      I have reviewed and agree with the history as documented      Social History   Substance Use Topics    Smoking status: Never Smoker    Smokeless tobacco: Never Used    Alcohol use No        Review of Systems   Constitutional: Negative for chills and fever  HENT: Positive for congestion and rhinorrhea  Negative for drooling, ear discharge, ear pain, facial swelling and sore throat  Eyes: Negative for redness  Respiratory: Positive for cough and shortness of breath  Cardiovascular: Negative for chest pain  Gastrointestinal: Negative for abdominal pain, diarrhea, nausea and vomiting  Musculoskeletal: Negative for neck stiffness  Skin: Negative for rash  Allergic/Immunologic: Negative for food allergies  Neurological: Negative for weakness and numbness  Physical Exam  Physical Exam   Constitutional:  Non-toxic appearance  No distress  HENT:   Head: Normocephalic and atraumatic  Right Ear: Tympanic membrane, external ear and ear canal normal  No drainage, swelling or tenderness  No foreign bodies  Tympanic membrane is not erythematous  No hemotympanum  Left Ear: Tympanic membrane, external ear and ear canal normal  No drainage, swelling or tenderness  No foreign bodies  Tympanic membrane is not erythematous  No hemotympanum  Nose: Nose normal    Mouth/Throat: Uvula is midline, oropharynx is clear and moist and mucous membranes are normal  No uvula swelling  No posterior oropharyngeal edema, posterior oropharyngeal erythema or tonsillar abscesses  No tonsillar exudate  Neck: Normal range of motion and phonation normal  Neck supple  No tracheal deviation present  Cardiovascular: Normal rate, regular rhythm, S1 normal, S2 normal and normal heart sounds  Exam reveals no gallop and no friction rub  No murmur heard  Pulmonary/Chest: Effort normal  No respiratory distress  She has no decreased breath sounds  She has wheezes (diffuse)  She has no rhonchi  She has no rales  She exhibits no tenderness  Abdominal: Soft  Bowel sounds are normal  There is no tenderness  There is no rebound and no guarding         Neurological: She is alert  GCS eye subscore is 4  GCS verbal subscore is 5  GCS motor subscore is 6  Skin: Skin is warm and dry  No rash noted  Psychiatric: She has a normal mood and affect  Nursing note and vitals reviewed  Vital Signs  ED Triage Vitals   Temperature Pulse Respirations Blood Pressure SpO2   07/17/18 1647 07/17/18 1647 07/17/18 1647 07/17/18 1647 07/17/18 1647   98 2 °F (36 8 °C) 103 20 135/73 98 %      Temp Source Heart Rate Source Patient Position - Orthostatic VS BP Location FiO2 (%)   07/17/18 1647 07/17/18 1814 -- -- --   Oral Monitor         Pain Score       07/17/18 1647       8           Vitals:    07/17/18 1647 07/17/18 1814   BP: 135/73    Pulse: 103 100       Visual Acuity      ED Medications  Medications   albuterol inhalation solution 5 mg (5 mg Nebulization Given 7/17/18 1735)   ipratropium (ATROVENT) 0 02 % inhalation solution 0 5 mg (0 5 mg Nebulization Given 7/17/18 1735)       Diagnostic Studies  Results Reviewed     None                 XR chest 2 views   ED Interpretation by Flakito Mercado PA-C (07/17 1745)   No acute cardiopulmonary findings seen by me at this time  Final Result by Tosin Luis DO (07/17 1741)      No acute cardiopulmonary disease  Workstation performed: [id number]                    Procedures  Procedures       Phone Contacts  ED Phone Contact    ED Course  ED Course as of Jul 17 1820   Tue Jul 17, 2018   1809 Breathing treatment completed  Lung sounds now clear  Patient states this feels like her typical asthma  Will discharge  MDM  Number of Diagnoses or Management Options  Asthma exacerbation: new and requires workup  Diagnosis management comments: DDX consists of but not limited to: asthma exacerbation, anemia, PE, pneumothorax    Will check CXR and give duoneb  1809 Breathing treatment completed  Lung sounds now clear  Patient states this feels like her typical asthma  Will discharge        At discharge, I instructed the patient to:  -follow up with pcp  -take Azithromycin as prescribed  -take Prednisone as prescribed  -use Albuterol inhaler as prescribed  -rest and drink plenty of fluids  -return to the ER if symptoms worsened or new symptoms arose  Patient agreed to this plan and was stable at time of discharge  Amount and/or Complexity of Data Reviewed  Tests in the radiology section of CPT®: ordered and reviewed  Independent visualization of images, tracings, or specimens: yes    Patient Progress  Patient progress: stable    CritCare Time    Disposition  Final diagnoses:   Asthma exacerbation     Time reflects when diagnosis was documented in both MDM as applicable and the Disposition within this note     Time User Action Codes Description Comment    7/17/2018  6:15 PM Ernestinecourtney Botellolaura PIÑA Add [J45 [id number]] Asthma exacerbation       ED Disposition     ED Disposition Condition Comment    Discharge  [place] discharge to home/self care  Condition at discharge: Stable        Follow-up Information     Follow up With Specialties Details Why Contact Info    Jennifer Parisi MD Family Medicine Schedule an appointment as soon as possible for a visit in 1 day  [address] [place] [address]            Patient's Medications   Discharge Prescriptions    ALBUTEROL (PROVENTIL HFA,VENTOLIN HFA) 90 MCG/ACT INHALER    Inhale 2 puffs every 6 (six) hours as needed for wheezing       Start Date: 7/17/2018 End Date: --       Order Dose: 2 puffs       Quantity: 8 g    Refills: 0    AZITHROMYCIN (ZITHROMAX) 250 MG TABLET    Take 2 tablets (500mg) by mouth on day one then take 1 tablet (250mg) by mouth for the next four days         Start Date: 7/17/2018 End Date: 7/22/2018       Order Dose: --       Quantity: 6 tablet    Refills: 0    PREDNISONE 10 MG TABLET    Take 4 tablets for 2 days then take 3 tablets for 2 days then take 2 tablets for 2 days then take 1 tablet for 2 days       Start Date: 7/17/2018 End Date: --       Order Dose: --       Quantity: 20 tablet    Refills: 0     No discharge procedures on file      ED Provider  Electronically Signed by           Chandler Child PA-C  07/17/18 4047

## 2018-07-17 NOTE — DISCHARGE INSTRUCTIONS
Asthma   WHAT YOU NEED TO KNOW:   Asthma is a lung disease that makes breathing difficult  Chronic inflammation and reactions to triggers narrow the airways in the lungs  Asthma can become life-threatening if it is not managed  DISCHARGE INSTRUCTIONS:   Return to the emergency department if:   · You have severe shortness of breath  · Your lips or nails turn blue or gray  · The skin around your neck and ribs pulls in with each breath  · You have shortness of breath, even after you take your short-term medicine as directed  · Your peak flow numbers are in the red zone of your AAP  Contact your healthcare provider if:   · You run out of medicine before your next refill is due  · Your symptoms get worse  · You need to take more medicine than usual to control your symptoms  · You have questions or concerns about your condition or care  Medicines:   · Medicines  decrease inflammation, open airways, and make it easier to breathe  Medicines may be inhaled, taken as a pill, or injected  Short-term medicines relieve your symptoms quickly  Long-term medicines are used to prevent future attacks  You may also need medicine to help control your allergies  Ask your healthcare provider for more information about the medicine you are given and how to take it safely  · Take your medicine as directed  Contact your healthcare provider if you think your medicine is not helping or if you have side effects  Tell him of her if you are allergic to any medicine  Keep a list of the medicines, vitamins, and herbs you take  Include the amounts, and when and why you take them  Bring the list or the pill bottles to follow-up visits  Carry your medicine list with you in case of an emergency  Follow up with your healthcare provider as directed: You will need to return to make sure your medicine is working and your symptoms are controlled   You may be referred to an asthma specialist  Yakov Rosado may be asked to keep a record of your peak flow values and bring it with you to your appointments  Write down your questions so you remember to ask them during your visits  Manage your symptoms and prevent future attacks:   · Follow your Asthma Action Plan (AAP)  This is a written plan that you and your healthcare provider create  It explains which medicine you need and when to change doses if necessary  It also explains how you can monitor symptoms and use a peak flow meter  The meter measures how well your lungs are working  · Manage other health conditions , such as allergies, acid reflux, and sleep apnea  · Identify and avoid triggers  These may include pets, dust mites, mold, and cockroaches  · Do not smoke or be around others who smoke  Nicotine and other chemicals in cigarettes and cigars can cause lung damage  Ask your healthcare provider for information if you currently smoke and need help to quit  E-cigarettes or smokeless tobacco still contain nicotine  Talk to your healthcare provider before you use these products  · Ask about the flu vaccine  The flu can make your asthma worse  You may need a yearly flu shot  © 2017 [address] [place] [address] Information is for End User's use only and may not be sold, redistributed or otherwise used for commercial purposes  All illustrations and images included in CareNotes® are the copyrighted property of A D A M , Inc  or [employer]  The above information is an  only  It is not intended as medical advice for individual conditions or treatments  Talk to your doctor, nurse or pharmacist before following any medical regimen to see if it is safe and effective for you  DISCHARGE INSTRUCTIONS:    FOLLOW UP WITH YOUR PRIMARY CARE PROVIDER OR THE [address] [place] [address]  MAKE AN APPOINTMENT TO BE SEEN  TAKE AZITHROMYCIN AS PRESCRIBED   IF RASH, SHORTNESS OF BREATH OR TROUBLE SWALLOWING, STOP TAKING THE MEDICATION AND BE SEEN     TAKE PREDNISONE AS PRESCRIBED  IF RASH, SHORTNESS OF BREATH OR TROUBLE SWALLOWING, STOP TAKING THE MEDICATION AND BE SEEN  USE ALBUTEROL INHALER  IF RASH, SHORTNESS OF BREATH OR TROUBLE SWALLOWING, STOP TAKING THE MEDICATION AND BE SEEN  REST AND DRINK PLENTY OF FLUIDS  IF SYMPTOMS WORSEN OR NEW SYMPTOMS ARISE, RETURN TO THE ER TO BE SEEN

## 2018-09-18 DIAGNOSIS — Z33.1 PREGNANT STATE, INCIDENTAL: ICD-10-CM

## 2018-09-18 DIAGNOSIS — D50.8 OTHER IRON DEFICIENCY ANEMIAS: Primary | ICD-10-CM

## 2018-10-05 ENCOUNTER — TELEPHONE (OUTPATIENT)
Dept: HEMATOLOGY ONCOLOGY | Facility: CLINIC | Age: 23
End: 2018-10-05

## 2018-10-05 NOTE — TELEPHONE ENCOUNTER
Called patient as she had an 11:20 a m  Apt, and did not show up   Patients mother answered the phone, and she will have the patient call the office

## 2018-10-05 NOTE — TELEPHONE ENCOUNTER
Called patient for a third time regarding missed apt today  Left message for patient to call the office

## 2018-10-05 NOTE — TELEPHONE ENCOUNTER
Patient never called office  Called patient for a second time, and phone went to voicemail  Left message for patient to call the office

## 2018-12-08 ENCOUNTER — HOSPITAL ENCOUNTER (EMERGENCY)
Facility: HOSPITAL | Age: 23
Discharge: HOME | End: 2018-12-08
Attending: EMERGENCY MEDICINE | Admitting: EMERGENCY MEDICINE
Payer: COMMERCIAL

## 2018-12-08 VITALS
SYSTOLIC BLOOD PRESSURE: 134 MMHG | OXYGEN SATURATION: 100 % | RESPIRATION RATE: 18 BRPM | DIASTOLIC BLOOD PRESSURE: 75 MMHG | WEIGHT: 174 LBS | TEMPERATURE: 98.4 F | BODY MASS INDEX: 31.83 KG/M2 | HEART RATE: 78 BPM

## 2018-12-08 DIAGNOSIS — J45.909 ASTHMA: Primary | ICD-10-CM

## 2018-12-08 PROCEDURE — 99281 EMR DPT VST MAYX REQ PHY/QHP: CPT

## 2018-12-08 RX ORDER — ALBUTEROL SULFATE 2.5 MG/3ML
2.5 SOLUTION RESPIRATORY (INHALATION) ONCE
Status: COMPLETED | OUTPATIENT
Start: 2018-12-08 | End: 2018-12-08

## 2018-12-08 RX ORDER — ALBUTEROL SULFATE 90 UG/1
2 AEROSOL, METERED RESPIRATORY (INHALATION) EVERY 4 HOURS PRN
Qty: 1 INHALER | Refills: 0 | Status: SHIPPED | OUTPATIENT
Start: 2018-12-08 | End: 2020-01-23 | Stop reason: ALTCHOICE

## 2018-12-08 RX ADMIN — ALBUTEROL SULFATE 2.5 MG: 2.5 SOLUTION RESPIRATORY (INHALATION) at 17:33

## 2018-12-08 NOTE — ED PROVIDER NOTES
History  Chief Complaint   Patient presents with    Medication Refill     states ran out of her albuterol inhaler x1 moth ago with difficulty breathing since last night       Cough   Cough characteristics:  Dry  Sputum characteristics:  Nondescript  Severity:  Mild  Onset quality:  Gradual  Duration:  1 day  Timing:  Constant  Progression:  Unchanged  Chronicity:  New  Smoker: no    Context: upper respiratory infection    Relieved by:  Nothing  Worsened by:  Nothing  Ineffective treatments:  None tried  Associated symptoms: no chest pain, no chills, no headaches, no shortness of breath and no sinus congestion        Prior to Admission Medications   Prescriptions Last Dose Informant Patient Reported? Taking? albuterol (PROVENTIL HFA,VENTOLIN HFA) 90 mcg/act inhaler  Self Yes No   Sig: Inhale 2 puffs every 4 (four) hours as needed for wheezing or shortness of breath   albuterol (PROVENTIL HFA,VENTOLIN HFA) 90 mcg/act inhaler   No No   Sig: Inhale 2 puffs every 6 (six) hours as needed for wheezing   ibuprofen (MOTRIN) 200 mg tablet   No No   Sig: Take 3 tablets (600 mg total) by mouth every 6 (six) hours as needed for moderate pain   predniSONE 10 mg tablet   No No   Sig: Take 4 tablets for 2 days then take 3 tablets for 2 days then take 2 tablets for 2 days then take 1 tablet for 2 days      Facility-Administered Medications: None       Past Medical History:   Diagnosis Date    Asthma        Past Surgical History:   Procedure Laterality Date     SECTION      [place]  DELIVERY ONLY N/A 2018    Procedure:  SECTION () REPEAT;  Surgeon: Gem Hernandez DO;  [facility];  Service: Obstetrics       Family History   Problem Relation Age of Onset    No Known Problems Mother     No Known Problems Father     No Known Problems Sister     No Known Problems Brother      I have reviewed and agree with the history as documented      Social History   Substance Use Topics    Smoking status: Never Smoker    Smokeless tobacco: Never Used    Alcohol use No        Review of Systems   Constitutional: Negative for activity change, appetite change and chills  Respiratory: Positive for cough and chest tightness  Negative for shortness of breath  Cardiovascular: Negative for chest pain  Gastrointestinal: Negative for abdominal pain, nausea and vomiting  Genitourinary: Negative for dysuria  Neurological: Negative for headaches  All other systems reviewed and are negative  Physical Exam  Physical Exam   Constitutional: She appears well-developed and well-nourished  No distress  HENT:   Head: Normocephalic and atraumatic  Eyes: Pupils are equal, round, and reactive to light  Conjunctivae and EOM are normal    Neck: Normal range of motion  Neck supple  Cardiovascular: Normal rate, regular rhythm, normal heart sounds and intact distal pulses  Exam reveals no gallop and no friction rub  No murmur heard  Pulmonary/Chest: Effort normal and breath sounds normal    Faint expiratory wheezes in bilateral upper lung fields   Abdominal: Soft  Bowel sounds are normal    Musculoskeletal: Normal range of motion  Neurological: She is alert  Skin: Skin is warm and dry  Capillary refill takes less than 2 seconds  No rash noted  She is not diaphoretic  Nursing note and vitals reviewed        Vital Signs  ED Triage Vitals [12/08/18 1656]   Temperature Pulse Respirations Blood Pressure SpO2   98 4 °F (36 9 °C) 78 18 134/75 100 %      Temp Source Heart Rate Source Patient Position - Orthostatic VS BP Location FiO2 (%)   Oral Monitor Sitting Right arm --      Pain Score       No Pain           Vitals:    12/08/18 1656   BP: 134/75   Pulse: 78   Patient Position - Orthostatic VS: Sitting       Visual Acuity      ED Medications  Medications   albuterol inhalation solution 2 5 mg (2 5 mg Nebulization Given 12/8/18 0983)       Diagnostic Studies  Results Reviewed     None                 No orders to display              Procedures  Procedures       Phone Contacts  ED Phone Contact    ED Course                               MDM  Number of Diagnoses or Management Options  Asthma: new and does not require workup  Diagnosis management comments: Patient presents emergency department for evaluation of med refill  Patient states that yesterday she started having mild cough and chest tightness associated with asthma symptoms  States that this is typical presentation for when she gets URI/change of weather  Does not have albuterol at home  Has not called PCP  Otherwise asymptomatic  No chest pain  No shortness of breath currently  Patient slowly reports chest tightness and some wheezing  On exam patient no acute distress  Faint wheezes noted  Will give albuterol emergency department discharge with inhaler  PCP follow-up  Strict return precautions worsening symptoms  Patient states she has never been hospitalized or intubated with her asthma in the past   She has not taken daily medication for asthma control    Risk of Complications, Morbidity, and/or Mortality  Presenting problems: low  Diagnostic procedures: minimal  Management options: low    Patient Progress  Patient progress: stable    CritCare Time    Disposition  Final diagnoses:   Asthma     Time reflects when diagnosis was documented in both MDM as applicable and the Disposition within this note     Time User Action Codes Description Comment    12/8/2018  5:42 PM Mariela La Add [J45 [id number]] Asthma       ED Disposition     ED Disposition Condition Comment    Discharge  [place] discharge to home/self care      Condition at discharge: Stable        Follow-up Information     Follow up With Specialties Details Why Contact Info Additional Information    Vera Moody MD [facility] Medicine   [address] [place] [address] [address]  [address] [facility]  [address]  [facility] [address]        [facility] Emergency Medicine  If symptoms worsen [address] [facility]  [phone] [place] ED, [address]  , [place], [place], [address]          Discharge Medication List as of 12/8/2018  5:45 PM      CONTINUE these medications which have CHANGED    Details   !! albuterol (PROVENTIL HFA,VENTOLIN HFA) 90 mcg/act inhaler Inhale 2 puffs every 4 (four) hours as needed for wheezing or shortness of breath, Starting Sat 12/8/2018, Print       !! - Potential duplicate medications found  Please discuss with provider  CONTINUE these medications which have NOT CHANGED    Details   !! albuterol (PROVENTIL HFA,VENTOLIN HFA) 90 mcg/act inhaler Inhale 2 puffs every 6 (six) hours as needed for wheezing, Starting Tue 7/17/2018, Print      ibuprofen (MOTRIN) 200 mg tablet Take 3 tablets (600 mg total) by mouth every 6 (six) hours as needed for moderate pain, Starting Wed 6/13/2018, Print      predniSONE 10 mg tablet Take 4 tablets for 2 days then take 3 tablets for 2 days then take 2 tablets for 2 days then take 1 tablet for 2 days, Print       !! - Potential duplicate medications found  Please discuss with provider  No discharge procedures on file      ED Provider  Electronically Signed by           Drew Dickerson PA-C  12/08/18 8681

## 2018-12-08 NOTE — DISCHARGE INSTRUCTIONS
Asthma   WHAT YOU NEED TO KNOW:   Asthma is a lung disease that makes breathing difficult  Chronic inflammation and reactions to triggers narrow the airways in the lungs  Asthma can become life-threatening if it is not managed  DISCHARGE INSTRUCTIONS:   Return to the emergency department if:   · You have severe shortness of breath  · Your lips or nails turn blue or gray  · The skin around your neck and ribs pulls in with each breath  · You have shortness of breath, even after you take your short-term medicine as directed  · Your peak flow numbers are in the red zone of your AAP  Contact your healthcare provider if:   · You run out of medicine before your next refill is due  · Your symptoms get worse  · You need to take more medicine than usual to control your symptoms  · You have questions or concerns about your condition or care  Medicines:   · Medicines  decrease inflammation, open airways, and make it easier to breathe  Medicines may be inhaled, taken as a pill, or injected  Short-term medicines relieve your symptoms quickly  Long-term medicines are used to prevent future attacks  You may also need medicine to help control your allergies  Ask your healthcare provider for more information about the medicine you are given and how to take it safely  · Take your medicine as directed  Contact your healthcare provider if you think your medicine is not helping or if you have side effects  Tell him of her if you are allergic to any medicine  Keep a list of the medicines, vitamins, and herbs you take  Include the amounts, and when and why you take them  Bring the list or the pill bottles to follow-up visits  Carry your medicine list with you in case of an emergency  Follow up with your healthcare provider as directed: You will need to return to make sure your medicine is working and your symptoms are controlled   You may be referred to an asthma specialist  Rodriguez Guillen may be asked to keep a record of your peak flow values and bring it with you to your appointments  Write down your questions so you remember to ask them during your visits  Manage your symptoms and prevent future attacks:   · Follow your Asthma Action Plan (AAP)  This is a written plan that you and your healthcare provider create  It explains which medicine you need and when to change doses if necessary  It also explains how you can monitor symptoms and use a peak flow meter  The meter measures how well your lungs are working  · Manage other health conditions , such as allergies, acid reflux, and sleep apnea  · Identify and avoid triggers  These may include pets, dust mites, mold, and cockroaches  · Do not smoke or be around others who smoke  Nicotine and other chemicals in cigarettes and cigars can cause lung damage  Ask your healthcare provider for information if you currently smoke and need help to quit  E-cigarettes or smokeless tobacco still contain nicotine  Talk to your healthcare provider before you use these products  · Ask about the flu vaccine  The flu can make your asthma worse  You may need a yearly flu shot  © 2017 [facility] Information is for End User's use only and may not be sold, redistributed or otherwise used for commercial purposes  All illustrations and images included in CareNotes® are the copyrighted property of A D A M , Inc  or Jesse Turner  The above information is an  only  It is not intended as medical advice for individual conditions or treatments  Talk to your doctor, nurse or pharmacist before following any medical regimen to see if it is safe and effective for you

## 2018-12-08 NOTE — ED NOTES
Patient is resting comfortably  "I don't have an inhaler at home I ran out of it  Really no pain or anything"       Nettie Bolanos RN  12/08/18 9085

## 2018-12-14 ENCOUNTER — HOSPITAL ENCOUNTER (EMERGENCY)
Facility: HOSPITAL | Age: 23
Discharge: HOME | End: 2018-12-14
Attending: EMERGENCY MEDICINE | Admitting: EMERGENCY MEDICINE
Payer: COMMERCIAL

## 2018-12-14 VITALS
HEART RATE: 83 BPM | OXYGEN SATURATION: 98 % | SYSTOLIC BLOOD PRESSURE: 140 MMHG | TEMPERATURE: 98.1 F | DIASTOLIC BLOOD PRESSURE: 80 MMHG | RESPIRATION RATE: 16 BRPM

## 2018-12-14 DIAGNOSIS — J06.9 URI (UPPER RESPIRATORY INFECTION): Primary | ICD-10-CM

## 2018-12-14 DIAGNOSIS — H66.90 OTITIS MEDIA: ICD-10-CM

## 2018-12-14 PROCEDURE — 99282 EMERGENCY DEPT VISIT SF MDM: CPT

## 2018-12-14 RX ORDER — FLUTICASONE PROPIONATE 50 MCG
1 SPRAY, SUSPENSION (ML) NASAL DAILY
Qty: 16 G | Refills: 0 | Status: SHIPPED | OUTPATIENT
Start: 2018-12-14 | End: 2020-01-23 | Stop reason: ALTCHOICE

## 2018-12-14 RX ORDER — AMOXICILLIN 500 MG/1
500 CAPSULE ORAL 3 TIMES DAILY
Qty: 21 CAPSULE | Refills: 0 | Status: SHIPPED | OUTPATIENT
Start: 2018-12-14 | End: 2018-12-24

## 2018-12-14 NOTE — ED PROVIDER NOTES
History  Chief Complaint   Patient presents with    Earache     Reports ear pain since 4 am this morning  Patient 18-year-old female with past medical history of asthma presents for evaluation of URI symptoms and ear pain  She states her URI symptoms include nasal congestion, nasal discharge, sinus pressure, sore throat  She states he started a few days ago  She woke up this morning and states that she had bilateral ear pain  She feels a pressure/clogged sensation and has decreased hearing  She states the left is worse than the right  She has not seen any bleeding or drainage from the ears  She denies any injury or trauma  She denies cough and states that her asthma has been well controlled with albuterol  She denies any fever, chills, nausea vomiting diarrhea, chest pain, shortness breath, abdominal pain, rash  Prior to Admission Medications   Prescriptions Last Dose Informant Patient Reported? Taking?    albuterol (PROVENTIL HFA,VENTOLIN HFA) 90 mcg/act inhaler   No No   Sig: Inhale 2 puffs every 6 (six) hours as needed for wheezing   albuterol (PROVENTIL HFA,VENTOLIN HFA) 90 mcg/act inhaler   No No   Sig: Inhale 2 puffs every 4 (four) hours as needed for wheezing or shortness of breath   predniSONE 10 mg tablet   No No   Sig: Take 4 tablets for 2 days then take 3 tablets for 2 days then take 2 tablets for 2 days then take 1 tablet for 2 days      Facility-Administered Medications: None       Past Medical History:   Diagnosis Date    Asthma        Past Surgical History:   Procedure Laterality Date     SECTION      [place]  DELIVERY ONLY N/A 2018    Procedure:  SECTION () REPEAT;  Surgeon: Ernesto Neves DO;  [facility];  Service: Obstetrics       Family History   Problem Relation Age of Onset    No Known Problems Mother     No Known Problems Father     No Known Problems Sister     No Known Problems Brother      I have reviewed and agree with the history as documented  Social History   Substance Use Topics    Smoking status: Never Smoker    Smokeless tobacco: Never Used    Alcohol use No        Review of Systems   Constitutional: Negative for chills and fever  HENT: Positive for congestion, ear pain, sinus pressure and sore throat  Negative for ear discharge  Respiratory: Negative for cough, shortness of breath and wheezing  Cardiovascular: Negative for chest pain  Gastrointestinal: Negative for abdominal pain, diarrhea, nausea and vomiting  Skin: Negative for rash  All other systems reviewed and are negative  Physical Exam  Physical Exam   Constitutional: Vital signs are normal  She appears well-developed and well-nourished  She is active  Non-toxic appearance  No distress  HENT:   Head: Normocephalic and atraumatic  Right Ear: External ear and ear canal normal  No mastoid tenderness  Tympanic membrane is injected and erythematous  Left Ear: External ear and ear canal normal  There is tenderness  No mastoid tenderness  Tympanic membrane is injected and erythematous  Mouth/Throat: Uvula is midline and mucous membranes are normal  No trismus in the jaw  No uvula swelling  Oropharyngeal exudate and posterior oropharyngeal erythema present  No posterior oropharyngeal edema or tonsillar abscesses  Nasal congestion and bilateral maxillary sinus tenderness to percussion   Mild erythema noted to the posterior oropharynx  Eyes: Conjunctivae and EOM are normal    Neck: Normal range of motion  Neck supple  Cardiovascular: Normal rate, regular rhythm and normal heart sounds  Exam reveals no gallop and no friction rub  No murmur heard  Pulmonary/Chest: Effort normal and breath sounds normal  No respiratory distress  She has no rales  Musculoskeletal: Normal range of motion  Lymphadenopathy:     She has cervical adenopathy  Neurological: She is alert  Skin: She is not diaphoretic     Nursing note and vitals reviewed  Vital Signs  ED Triage Vitals [12/14/18 0833]   Temperature Pulse Respirations Blood Pressure SpO2   98 1 °F (36 7 °C) 83 16 140/80 98 %      Temp Source Heart Rate Source Patient Position - Orthostatic VS BP Location FiO2 (%)   Temporal Monitor Sitting Right arm --      Pain Score       8           Vitals:    12/14/18 0833   BP: 140/80   Pulse: 83   Patient Position - Orthostatic VS: Sitting       Visual Acuity      ED Medications  Medications - No data to display    Diagnostic Studies  Results Reviewed     None                 No orders to display              Procedures  Procedures       Phone Contacts  ED Phone Contact    ED Course                               MDM  CritCare Time    Disposition  Final diagnoses:   URI (upper respiratory infection)   Otitis media     Time reflects when diagnosis was documented in both MDM as applicable and the Disposition within this note     Time User Action Codes Description Comment    12/14/2018  9:02 AM Gordon Comings Add [J06 9] URI (upper respiratory infection)     12/14/2018  9:02 AM Gordon Comings Add [H66 90] Otitis media       ED Disposition     ED Disposition Condition Comment    Discharge  [place] discharge to home/self care      Condition at discharge: Good        Follow-up Information     Follow up With Specialties Details Why Contact Info Additional Information    Alessandra Khan MD Family Medicine Schedule an appointment as soon as possible for a visit  [address]  [address] Emergency Department Emergency Medicine  If symptoms worsen or new symptoms arise as discussed  [address] [place] ED, [address] [place], [place], [address]          Patient's Medications   Discharge Prescriptions    AMOXICILLIN (AMOXIL) 500 MG CAPSULE    Take 1 capsule (500 mg total) by mouth 3 (three) times a day for 10 days       Start Date: 12/14/2018End Date: 12/24/2018       Order Dose: 500 mg       Quantity: 21 capsule    Refills: 0    FLUTICASONE (FLONASE) 50 MCG/ACT NASAL SPRAY    1 spray into each nostril daily       Start Date: 12/14/2018End Date: --       Order Dose: 1 spray       Quantity: 16 g    Refills: 0     No discharge procedures on file      ED Provider  Electronically Signed by           Heather Rivera PA-C  12/14/18 77

## 2018-12-14 NOTE — DISCHARGE INSTRUCTIONS
Otitis Media   WHAT YOU NEED TO KNOW:   Otitis media is an ear infection  DISCHARGE INSTRUCTIONS:   Medicines:  · Ibuprofen or acetaminophen  helps decrease your pain and fever  They are available without a doctor's order  Ask your healthcare provider which medicine is right for you  Ask how much to take and how often to take it  These medicines can cause stomach bleeding if not taken correctly  Ibuprofen can cause kidney damage  Do not take ibuprofen if you have kidney disease, an ulcer, or allergies to aspirin  Acetaminophen can cause liver damage  Do not drink alcohol if you take acetaminophen  · Ear drops  help treat your ear pain  · Antibiotics  help treat a bacterial infection that caused your ear infection  · Take your medicine as directed  Contact your healthcare provider if you think your medicine is not helping or if you have side effects  Tell him or her if you are allergic to any medicine  Keep a list of the medicines, vitamins, and herbs you take  Include the amounts, and when and why you take them  Bring the list or the pill bottles to follow-up visits  Carry your medicine list with you in case of an emergency  Heat or ice:   · Heat  may be used to decrease your pain  Place a warm, moist washcloth on your ear  Apply for 15 to 20 minutes, 3 to 4 times a day    · Ice  helps decrease swelling and pain  Use an ice pack or put crushed ice in a plastic bag  Cover the ice pack with a towel and place it on your ear for 15 to 20 minutes, 3 to 4 times a day for 2 days  Prevent otitis media:   · Wash your hands often  Use soap and water  Wash your hands after you use the bathroom, change a child's diapers, or sneeze  Wash your hands before you prepare or eat food  · Stay away from people who are ill  Some germs are easily and quickly spread through contact  Return to work or school: You may return to work or school when your fever is gone     Follow up with your healthcare provider as directed:  Write down your questions so you remember to ask them during your visits  Contact your healthcare provider if:   · Your ear pain gets worse or does not go away, even after treatment  · The outside of your ear is red or swollen  · You have vomiting or diarrhea  · You have fluid coming from your ear  · You have questions or concerns about your condition or care  Return to the emergency department if:   · You have a seizure  · You have a fever and a stiff neck  © 2017 [employer]  Information is for End User's use only and may not be sold, redistributed or otherwise used for commercial purposes  All illustrations and images included in CareNotes® are the copyrighted property of [employer]  or [employer]  The above information is an  only  It is not intended as medical advice for individual conditions or treatments  Talk to your doctor, nurse or pharmacist before following any medical regimen to see if it is safe and effective for you  Upper Respiratory Infection   WHAT YOU NEED TO KNOW:   An upper respiratory infection is also called the common cold  It is an infection that can affect your nose, throat, ears, and sinuses  For healthy people, the common cold is usually not serious and does not need special treatment  Cold symptoms are usually worst for the first 3 to 5 days  Most people get better in 7 to 14 days  You may continue to cough for 2 to 3 weeks  Colds are caused by viruses and do not get better with antibiotics  DISCHARGE INSTRUCTIONS:   Return to the emergency department if:   · You have chest pain or trouble breathing  Contact your healthcare provider if:   · You have a fever over 102ºF (39°C)  · Your sore throat gets worse or you see white or yellow spots in your throat  · Your symptoms get worse after 3 to 5 days or your cold is not better in 14 days  · You have a rash anywhere on your skin      · You have large, tender lumps in your neck  · You have thick, green or yellow drainage from your nose  · You cough up thick yellow, green, or bloody mucus  · You have vomiting for more than 24 hours and cannot keep fluids down  · You have a bad earache  · You have questions or concerns about your condition or care  Medicines: You may need any of the following:  · Decongestants  help reduce nasal congestion and help you breathe more easily  If you take decongestant pills, they may make you feel restless or cause problems with your sleep  Do not use decongestant sprays for more than a few days  · Cough suppressants  help reduce coughing  Ask your healthcare provider which type of cough medicine is best for you  · NSAIDs , such as ibuprofen, help decrease swelling, pain, and fever  NSAIDs can cause stomach bleeding or kidney problems in certain people  If you take blood thinner medicine, always ask your healthcare provider if NSAIDs are safe for you  Always read the medicine label and follow directions  · Acetaminophen  decreases pain and fever  It is available without a doctor's order  Ask how much to take and how often to take it  Follow directions  Read the labels of all other medicines you are using to see if they also contain acetaminophen, or ask your doctor or pharmacist  Acetaminophen can cause liver damage if not taken correctly  Do not use more than 4 grams (4,000 milligrams) total of acetaminophen in one day  · Take your medicine as directed  Contact your healthcare provider if you think your medicine is not helping or if you have side effects  Tell him or her if you are allergic to any medicine  Keep a list of the medicines, vitamins, and herbs you take  Include the amounts, and when and why you take them  Bring the list or the pill bottles to follow-up visits  Carry your medicine list with you in case of an emergency    Follow up with your healthcare provider as directed:  Write down your questions so you remember to ask them during your visits  Self-care:   · Rest as much as possible  Slowly start to do more each day  · Drink more liquids as directed  Liquids will help thin and loosen mucus so you can cough it up  Liquids will also help prevent dehydration  Liquids that help prevent dehydration include water, fruit juice, and broth  Do not drink liquids that contain caffeine  Caffeine can increase your risk for dehydration  Ask your healthcare provider how much liquid to drink each day  · Soothe a sore throat  Gargle with warm salt water  This helps your sore throat feel better  Make salt water by dissolving ¼ teaspoon salt in 1 cup warm water  You may also suck on hard candy or throat lozenges  You may use a sore throat spray  · Use a humidifier or vaporizer  Use a cool mist humidifier or a vaporizer to increase air moisture in your home  This may make it easier for you to breathe and help decrease your cough  · Use saline nasal drops as directed  These help relieve congestion  · Apply petroleum-based jelly around the outside of your nostrils  This can decrease irritation from blowing your nose  · Do not smoke  Nicotine and other chemicals in cigarettes and cigars can make your symptoms worse  They can also cause infections such as bronchitis or pneumonia  Ask your healthcare provider for information if you currently smoke and need help to quit  E-cigarettes or smokeless tobacco still contain nicotine  Talk to your healthcare provider before you use these products  Prevent spreading your cold to others:   · Try to stay away from other people during the first 2 to 3 days of your cold when it is more easily spread  · Do not share food or drinks  · Do not share hand towels with household members  · Wash your hands often, especially after you blow your nose   Turn away from other people and cover your mouth and nose with a tissue when you sneeze or cough        © 2017 [facility] Information is for End User's use only and may not be sold, redistributed or otherwise used for commercial purposes  All illustrations and images included in CareNotes® are the copyrighted property of A [employer] A M , Inc  or [employer]  The above information is an  only  It is not intended as medical advice for individual conditions or treatments  Talk to your doctor, nurse or pharmacist before following any medical regimen to see if it is safe and effective for you

## 2019-08-21 ENCOUNTER — TELEPHONE (OUTPATIENT)
Dept: FAMILY MEDICINE CLINIC | Facility: CLINIC | Age: 24
End: 2019-08-21

## 2019-08-21 NOTE — TELEPHONE ENCOUNTER
NO SHOW LETTER RETURNED TO OUR OFFICE FROM PT'S CURRENT ADDRESS  [address] [place] [address]   PLEASE CVERIFY PT'S ADDRESS

## 2020-01-23 ENCOUNTER — HOSPITAL ENCOUNTER (EMERGENCY)
Facility: HOSPITAL | Age: 25
Discharge: HOME | End: 2020-01-23
Attending: EMERGENCY MEDICINE
Payer: COMMERCIAL

## 2020-01-23 VITALS
WEIGHT: 180.78 LBS | HEART RATE: 82 BPM | OXYGEN SATURATION: 95 % | RESPIRATION RATE: 18 BRPM | DIASTOLIC BLOOD PRESSURE: 68 MMHG | BODY MASS INDEX: 33.06 KG/M2 | TEMPERATURE: 97.8 F | SYSTOLIC BLOOD PRESSURE: 142 MMHG

## 2020-01-23 DIAGNOSIS — J06.9 VIRAL UPPER RESPIRATORY INFECTION: Primary | ICD-10-CM

## 2020-01-23 DIAGNOSIS — J45.901 ASTHMA EXACERBATION: ICD-10-CM

## 2020-01-23 PROCEDURE — 99284 EMERGENCY DEPT VISIT MOD MDM: CPT | Performed by: PHYSICIAN ASSISTANT

## 2020-01-23 PROCEDURE — 99282 EMERGENCY DEPT VISIT SF MDM: CPT

## 2020-01-23 RX ORDER — METHYLPREDNISOLONE 4 MG/1
TABLET ORAL
Qty: 21 TABLET | Refills: 0 | Status: SHIPPED | OUTPATIENT
Start: 2020-01-23 | End: 2020-05-27

## 2020-01-23 RX ORDER — ALBUTEROL SULFATE 90 UG/1
1-2 AEROSOL, METERED RESPIRATORY (INHALATION) EVERY 6 HOURS PRN
Qty: 1 INHALER | Refills: 0 | Status: SHIPPED | OUTPATIENT
Start: 2020-01-23

## 2020-01-23 RX ORDER — ALBUTEROL SULFATE 90 UG/1
2 AEROSOL, METERED RESPIRATORY (INHALATION) ONCE
Status: COMPLETED | OUTPATIENT
Start: 2020-01-23 | End: 2020-01-23

## 2020-01-23 RX ADMIN — ALBUTEROL SULFATE 2 PUFF: 90 AEROSOL, METERED RESPIRATORY (INHALATION) at 20:49

## 2020-01-25 NOTE — ED PROVIDER NOTES
History  Chief Complaint   Patient presents with    Sore Throat     3 days of sore throat coughing, denies fevers no medication prior to arrival     [facility] is a 29 yo F, w/ PMH of intermittent asthma, presenting with 3 days of nonproductive cough, nasal congestion, sore throat, and intermittent SOB/wheezing  States she ran out of previously prescribed albuterol several weeks ago  No fevers/chills  No N/V/D or abdominal pain  No medications PTA for symptoms  No known sick contacts or recent travel  History provided by:  Patient   used: No    Asthma   Duration:  3 days  Timing:  Intermittent  Progression:  Waxing and waning  Chronicity:  New  Relieved by:  None tried  Worsened by:  URI  Associated symptoms: congestion, cough, shortness of breath, sore throat and wheezing    Associated symptoms: no abdominal pain, no chest pain, no diarrhea, no ear pain, no fever, no headaches, no myalgias, no nausea, no rash, no rhinorrhea and no vomiting        None       Past Medical History:   Diagnosis Date    Asthma        Past Surgical History:   Procedure Laterality Date     SECTION      [place]  DELIVERY ONLY N/A 2018    Procedure:  SECTION () REPEAT;  Surgeon: Nita Marroquin DO;  Location: [facility];  Service: Obstetrics       Family History   Problem Relation Age of Onset    No Known Problems Mother     No Known Problems Father     No Known Problems Sister     No Known Problems Brother      I have reviewed and agree with the history as documented  Social History     Tobacco Use    Smoking status: Never Smoker    Smokeless tobacco: Never Used   Substance Use Topics    Alcohol use: No    Drug use: Yes     Types: Marijuana     Comment: used daily x 5 years  last used 2018        Review of Systems   Constitutional: Negative for chills and fever  HENT: Positive for congestion and sore throat   Negative for ear discharge, ear pain, mouth sores, rhinorrhea, sinus pressure, sinus pain and voice change  Eyes: Negative for pain, redness and visual disturbance  Respiratory: Positive for cough, shortness of breath and wheezing  Negative for chest tightness  Cardiovascular: Negative for chest pain and palpitations  Gastrointestinal: Negative for abdominal pain, constipation, diarrhea, nausea and vomiting  Genitourinary: Negative for dysuria, frequency and urgency  Musculoskeletal: Negative for myalgias, neck pain and neck stiffness  Skin: Negative for pallor, rash and wound  Neurological: Negative for dizziness, weakness, light-headedness, numbness and headaches  Physical Exam  Physical Exam   Constitutional: She is oriented to person, place, and time  She appears well-developed and well-nourished  Non-toxic appearance  No distress  HENT:   Head: Normocephalic and atraumatic  Right Ear: Tympanic membrane, external ear and ear canal normal    Left Ear: Tympanic membrane, external ear and ear canal normal    Nose: Nose normal    Mouth/Throat: Oropharynx is clear and moist  No oropharyngeal exudate  Eyes: Pupils are equal, round, and reactive to light  Conjunctivae and EOM are normal  Right eye exhibits no discharge  Left eye exhibits no discharge  Neck: Normal range of motion  Neck supple  Cardiovascular: Normal rate, regular rhythm and normal heart sounds  Exam reveals no gallop and no friction rub  No murmur heard  Pulmonary/Chest: Effort normal  No stridor  No respiratory distress  She has wheezes  She has no rales  Diffuse expiratory wheezing most prominently over b/l upper lung fields  No rales or rhonchi  No acute respiratory distress or increased work of breathing  Abdominal: Soft  Bowel sounds are normal  She exhibits no distension  There is no tenderness  There is no guarding  Lymphadenopathy:     She has no cervical adenopathy  Neurological: She is alert and oriented to person, place, and time   She exhibits normal muscle tone  Coordination normal    Skin: Skin is warm and dry  Capillary refill takes less than 2 seconds  No rash noted  She is not diaphoretic  No erythema  No pallor  Psychiatric: She has a normal mood and affect  Her behavior is normal  Judgment and thought content normal    Nursing note and vitals reviewed  Vital Signs  ED Triage Vitals [01/23/20 1935]   Temperature Pulse Respirations Blood Pressure SpO2   97 8 °F (36 6 °C) 82 18 142/68 95 %      Temp Source Heart Rate Source Patient Position - Orthostatic VS BP Location FiO2 (%)   Temporal Monitor Sitting Right arm --      Pain Score       --           Vitals:    01/23/20 1935   BP: 142/68   Pulse: 82   Patient Position - Orthostatic VS: Sitting         Visual Acuity      ED Medications  Medications   albuterol (PROVENTIL HFA,VENTOLIN HFA) inhaler 2 puff (2 puffs Inhalation Given 1/23/20 2049)       Diagnostic Studies  Results Reviewed     None                 No orders to display              Procedures  Procedures         ED Course                               MDM  Number of Diagnoses or Management Options  Asthma exacerbation:   Viral upper respiratory infection:   Diagnosis management comments: Three days of URI symptoms, likely viral etiology  Intermittent wheezing/SOB at home in context of uncontrolled asthma  Diffuse expiratory wheezing on exam without acute respiratory distress/increased work of breathing  Will provide duoneb/prednisone, reassess  Pt reports improvement in SOB following ED therapy  Residual trace expiratory wheezing on exam  Will discharge with oral steroid taper, albuterol PRN  Follow up advised with PCP for re-evaluation/management of chronic asthma symptoms  Strict return indications discussed       Patient Progress  Patient progress: improved        Disposition  Final diagnoses:   Viral upper respiratory infection   Asthma exacerbation     Time reflects when diagnosis was documented in both MDM as applicable and the Disposition within this note     Time User Action Codes Description Comment    1/23/2020  8:49 PM Rut Vale Add [J06 9] Viral upper respiratory infection     1/23/2020  8:50 PM Rut Vale Add [O16 335] Asthma exacerbation       ED Disposition     ED Disposition Condition Date/Time Comment    Discharge Stable Thu Jan 23, 2020  8:49 PM [place] discharge to home/self care  Follow-up Information     Follow up With Specialties Details Why Contact Info Additional Information    Powell Gitelman, MD Family Medicine Schedule an appointment as soon as possible for a visit   [address]  [address] [facility]  [address]  [phone]  [address] [facility] Emergency Department Emergency Medicine  If symptoms worsen [facility] [address]  [phone] [address] [facility] ED, [address] [place], [place], [facility]          Discharge Medication List as of 1/23/2020  8:51 PM      START taking these medications    Details   albuterol (PROVENTIL HFA,VENTOLIN HFA) 90 mcg/act inhaler Inhale 1-2 puffs every 6 (six) hours as needed for wheezing or shortness of breath, Starting Thu 1/23/2020, Normal      dextromethorphan-guaifenesin (MUCINEX DM)  MG per 12 hr tablet Take 1 tablet by mouth every 12 (twelve) hours as needed for cough, Starting Thu 1/23/2020, Normal      methylPREDNISolone 4 MG tablet therapy pack Use as directed on package, Normal           No discharge procedures on file      ED Provider  Electronically Signed by           Ivone Cruz PA-C  01/25/20 1010

## 2020-03-17 ENCOUNTER — TELEPHONE (OUTPATIENT)
Dept: OBGYN CLINIC | Facility: CLINIC | Age: 25
End: 2020-03-17

## 2020-05-27 ENCOUNTER — ANNUAL EXAM (OUTPATIENT)
Dept: OBGYN CLINIC | Facility: CLINIC | Age: 25
End: 2020-05-27

## 2020-05-27 VITALS
BODY MASS INDEX: 33.68 KG/M2 | HEIGHT: 62 IN | HEART RATE: 92 BPM | WEIGHT: 183 LBS | TEMPERATURE: 98.1 F | SYSTOLIC BLOOD PRESSURE: 120 MMHG | DIASTOLIC BLOOD PRESSURE: 70 MMHG

## 2020-05-27 DIAGNOSIS — N89.8 VAGINAL DISCHARGE: Primary | ICD-10-CM

## 2020-05-27 PROCEDURE — 99213 OFFICE O/P EST LOW 20 MIN: CPT | Performed by: OBSTETRICS & GYNECOLOGY

## 2020-05-27 PROCEDURE — T1015 CLINIC SERVICE: HCPCS | Performed by: OBSTETRICS & GYNECOLOGY

## 2020-05-27 RX ORDER — METRONIDAZOLE 500 MG/1
500 TABLET ORAL ONCE
Qty: 4 TABLET | Refills: 0 | Status: SHIPPED | OUTPATIENT
Start: 2020-05-27 | End: 2020-05-27

## 2021-01-30 ENCOUNTER — OFFICE VISIT (OUTPATIENT)
Dept: URGENT CARE | Age: 26
End: 2021-01-30
Payer: COMMERCIAL

## 2021-01-30 ENCOUNTER — APPOINTMENT (OUTPATIENT)
Dept: RADIOLOGY | Age: 26
End: 2021-01-30
Payer: COMMERCIAL

## 2021-01-30 VITALS
WEIGHT: 185 LBS | HEIGHT: 62 IN | TEMPERATURE: 99.3 F | DIASTOLIC BLOOD PRESSURE: 74 MMHG | RESPIRATION RATE: 16 BRPM | HEART RATE: 78 BPM | SYSTOLIC BLOOD PRESSURE: 130 MMHG | BODY MASS INDEX: 34.04 KG/M2 | OXYGEN SATURATION: 98 %

## 2021-01-30 DIAGNOSIS — T14.90XA TRAUMA: ICD-10-CM

## 2021-01-30 DIAGNOSIS — S63.601A SPRAIN OF RIGHT THUMB, UNSPECIFIED SITE OF DIGIT, INITIAL ENCOUNTER: Primary | ICD-10-CM

## 2021-01-30 DIAGNOSIS — S63.602A SPRAIN OF LEFT THUMB, UNSPECIFIED SITE OF DIGIT, INITIAL ENCOUNTER: ICD-10-CM

## 2021-01-30 PROCEDURE — G0382 LEV 3 HOSP TYPE B ED VISIT: HCPCS | Performed by: PHYSICIAN ASSISTANT

## 2021-01-30 PROCEDURE — 99283 EMERGENCY DEPT VISIT LOW MDM: CPT | Performed by: PHYSICIAN ASSISTANT

## 2021-01-30 PROCEDURE — 99203 OFFICE O/P NEW LOW 30 MIN: CPT | Performed by: PHYSICIAN ASSISTANT

## 2021-01-30 PROCEDURE — 73130 X-RAY EXAM OF HAND: CPT

## 2021-01-30 NOTE — PATIENT INSTRUCTIONS
Rest, Ice, and Elevate limb  Continue to monitor symptoms  If symptoms do not improve in one week, follow up with orthopedics  Call Nannette Nuno [phone] to schedule and appointment  If new or worsening symptoms occur, go immediately to the ER  Skier's Thumb   WHAT YOU NEED TO KNOW:   Skier's thumb, or gamekeeper's thumb, is when a ligament in your thumb is stretched or torn  Ligaments are strong tissues that connect bones and keep them in place, and support your joints  DISCHARGE INSTRUCTIONS:   Return to the emergency department if:   · You have severe thumb pain  · Your thumb or fingers look pale and feel cold  Contact your healthcare provider if:   · You have numbness or tingling in your thumb or fingers  · Your symptoms get worse, even with treatment  · You have questions or concerns about your condition or care  Medicines:   · NSAIDs , such as ibuprofen, help decrease swelling, pain, and fever  This medicine is available with or without a doctor's order  NSAIDs can cause stomach bleeding or kidney problems in certain people  If you take blood thinner medicine, always ask your healthcare provider if NSAIDs are safe for you  Always read the medicine label and follow directions  · Acetaminophen  decreases pain and fever  It is available without a doctor's order  Ask how much to take and how often to take it  Follow directions  Acetaminophen can cause liver damage if not taken correctly  · Prescription pain medicine  may be given  Ask your healthcare provider how to take this medicine safely  Some prescription pain medicines contain acetaminophen  Do not take other medicines that contain acetaminophen without talking to your healthcare provider  Too much acetaminophen may cause liver damage  Prescription pain medicine may cause constipation  Ask your healthcare provider how to prevent or treat constipation  · Take your medicine as directed    Contact your healthcare provider if you think your medicine is not helping or if you have side effects  Tell him of her if you are allergic to any medicine  Keep a list of the medicines, vitamins, and herbs you take  Include the amounts, and when and why you take them  Bring the list or the pill bottles to follow-up visits  Carry your medicine list with you in case of an emergency  Support devices:  Support devices include a removable splint, elastic bandage, or thumb cast  These are used to decrease or prevent movement of your thumb so it can heal  They are also used to prevent further damage to your thumb  These devices may be worn for 3 to 6 weeks  Manage your symptoms:   · Rest  your hand as directed  You will need to limit certain activities while your injury heals  Ask your healthcare provider what activities are safe to do  · Apply ice  on your thumb for 15 to 20 minutes every hour or as directed  Use an ice pack, or put crushed ice in a plastic bag  Cover it with a towel  Ice helps prevent tissue damage and decreases swelling and pain  · Elevate  your hand above the level of your heart as often as you can  This will help decrease swelling and pain  Prop your arm on pillows or blankets to keep it elevated comfortably  Physical therapy:  Physical therapy may be recommended after your injury has healed  A physical therapist teaches you exercises to help improve movement and strength, and to decrease pain  Follow up with your healthcare provider as directed:  Write down your questions so you remember to ask them during your visits  © Copyright [address] Information is for End User's use only and may not be sold, redistributed or otherwise used for commercial purposes  All illustrations and images included in CareNotes® are the copyrighted property of [employer]  or Carla Yuen   The above information is an  only   It is not intended as medical advice for individual conditions or treatments  Talk to your doctor, nurse or pharmacist before following any medical regimen to see if it is safe and effective for you
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
## 2021-02-17 ENCOUNTER — APPOINTMENT (OUTPATIENT)
Dept: RADIOLOGY | Facility: MEDICAL CENTER | Age: 26
End: 2021-02-17
Payer: COMMERCIAL

## 2021-02-17 ENCOUNTER — OFFICE VISIT (OUTPATIENT)
Dept: OBGYN CLINIC | Facility: MEDICAL CENTER | Age: 26
End: 2021-02-17
Payer: COMMERCIAL

## 2021-02-17 VITALS
DIASTOLIC BLOOD PRESSURE: 85 MMHG | SYSTOLIC BLOOD PRESSURE: 132 MMHG | WEIGHT: 190.8 LBS | BODY MASS INDEX: 35.11 KG/M2 | HEIGHT: 62 IN | HEART RATE: 57 BPM

## 2021-02-17 DIAGNOSIS — S63.601A SPRAIN OF RIGHT THUMB, UNSPECIFIED SITE OF DIGIT, INITIAL ENCOUNTER: ICD-10-CM

## 2021-02-17 DIAGNOSIS — S63.602A SPRAIN OF LEFT THUMB, UNSPECIFIED SITE OF DIGIT, INITIAL ENCOUNTER: ICD-10-CM

## 2021-02-17 PROCEDURE — 99204 OFFICE O/P NEW MOD 45 MIN: CPT | Performed by: ORTHOPAEDIC SURGERY

## 2021-02-17 PROCEDURE — 73140 X-RAY EXAM OF FINGER(S): CPT

## 2021-02-17 NOTE — LETTER
February 17, 2021     Patient: Alma Garcia   YOB: 1995   Date of Visit: 2/17/2021       To Whom it May Concern:    Igor Parker is under my professional care  She was seen in my office on 2/17/2021  She may return to work with the following restrictions: may type, sort, open boxes  No pinching/twisting or heavy gripping  She will be re-evaluated in 4 weeks  If you have any questions or concerns, please don't hesitate to call  Sincerely,          Phong Gayle MD        CC: Nirav Duffy

## 2021-02-17 NOTE — LETTER
February 17, 2021     Patient: Jorge A Finney   YOB: 1995   Date of Visit: 2/17/2021       To Whom it May Concern:      Nandini Lepe is under my professional care  She was seen in my office on 2/17/2021  She may return to work with the following restrictions: may type, sort, open boxes  No pinching/twisting or heavy gripping  She will be re-evaluated in 4 weeks  If you have any questions or concerns, please don't hesitate to call           Sincerely,          Orlando Cardona MD        CC: No Recipients
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
## 2021-02-25 ENCOUNTER — TELEPHONE (OUTPATIENT)
Dept: OBGYN CLINIC | Facility: HOSPITAL | Age: 26
End: 2021-02-25

## 2021-11-03 ENCOUNTER — APPOINTMENT (OUTPATIENT)
Dept: LAB | Facility: HOSPITAL | Age: 26
End: 2021-11-03
Payer: COMMERCIAL

## 2021-11-03 ENCOUNTER — OFFICE VISIT (OUTPATIENT)
Dept: OBGYN CLINIC | Facility: CLINIC | Age: 26
End: 2021-11-03

## 2021-11-03 VITALS
SYSTOLIC BLOOD PRESSURE: 138 MMHG | WEIGHT: 187 LBS | HEART RATE: 79 BPM | BODY MASS INDEX: 34.41 KG/M2 | HEIGHT: 62 IN | DIASTOLIC BLOOD PRESSURE: 91 MMHG

## 2021-11-03 DIAGNOSIS — R39.9 UTI SYMPTOMS: ICD-10-CM

## 2021-11-03 DIAGNOSIS — Z11.3 SCREEN FOR STD (SEXUALLY TRANSMITTED DISEASE): Primary | ICD-10-CM

## 2021-11-03 DIAGNOSIS — Z11.3 SCREEN FOR STD (SEXUALLY TRANSMITTED DISEASE): ICD-10-CM

## 2021-11-03 PROBLEM — Z3A.39 39 WEEKS GESTATION OF PREGNANCY: Status: RESOLVED | Noted: 2018-06-11 | Resolved: 2021-11-03

## 2021-11-03 PROBLEM — O99.340 DEPRESSION AFFECTING PREGNANCY: Status: RESOLVED | Noted: 2018-05-23 | Resolved: 2021-11-03

## 2021-11-03 PROBLEM — F32.A DEPRESSION AFFECTING PREGNANCY: Status: RESOLVED | Noted: 2018-05-23 | Resolved: 2021-11-03

## 2021-11-03 LAB
HBV SURFACE AG SER QL: NORMAL
HCV AB SER QL: NORMAL
SL AMB  POCT GLUCOSE, UA: ABNORMAL
SL AMB LEUKOCYTE ESTERASE,UA: ABNORMAL
SL AMB POCT BILIRUBIN,UA: ABNORMAL
SL AMB POCT BLOOD,UA: ABNORMAL
SL AMB POCT CLARITY,UA: ABNORMAL
SL AMB POCT COLOR,UA: YELLOW
SL AMB POCT KETONES,UA: ABNORMAL
SL AMB POCT NITRITE,UA: ABNORMAL
SL AMB POCT PH,UA: 7.5
SL AMB POCT SPECIFIC GRAVITY,UA: 1.01
SL AMB POCT URINE PROTEIN: ABNORMAL
SL AMB POCT UROBILINOGEN: ABNORMAL

## 2021-11-03 PROCEDURE — 81001 URINALYSIS AUTO W/SCOPE: CPT | Performed by: NURSE PRACTITIONER

## 2021-11-03 PROCEDURE — 87086 URINE CULTURE/COLONY COUNT: CPT | Performed by: NURSE PRACTITIONER

## 2021-11-03 PROCEDURE — 36415 COLL VENOUS BLD VENIPUNCTURE: CPT

## 2021-11-03 PROCEDURE — 87077 CULTURE AEROBIC IDENTIFY: CPT | Performed by: NURSE PRACTITIONER

## 2021-11-03 PROCEDURE — 81002 URINALYSIS NONAUTO W/O SCOPE: CPT | Performed by: NURSE PRACTITIONER

## 2021-11-03 PROCEDURE — 87491 CHLMYD TRACH DNA AMP PROBE: CPT | Performed by: NURSE PRACTITIONER

## 2021-11-03 PROCEDURE — 87340 HEPATITIS B SURFACE AG IA: CPT

## 2021-11-03 PROCEDURE — 87389 HIV-1 AG W/HIV-1&-2 AB AG IA: CPT | Performed by: NURSE PRACTITIONER

## 2021-11-03 PROCEDURE — 87591 N.GONORRHOEAE DNA AMP PROB: CPT | Performed by: NURSE PRACTITIONER

## 2021-11-03 PROCEDURE — 86803 HEPATITIS C AB TEST: CPT

## 2021-11-03 PROCEDURE — 99213 OFFICE O/P EST LOW 20 MIN: CPT | Performed by: NURSE PRACTITIONER

## 2021-11-03 PROCEDURE — 86592 SYPHILIS TEST NON-TREP QUAL: CPT | Performed by: NURSE PRACTITIONER

## 2021-11-04 LAB
BACTERIA UR QL AUTO: ABNORMAL /HPF
BILIRUB UR QL STRIP: NEGATIVE
CLARITY UR: CLEAR
COLOR UR: YELLOW
GLUCOSE UR STRIP-MCNC: NEGATIVE MG/DL
HGB UR QL STRIP.AUTO: ABNORMAL
HIV 1+2 AB+HIV1 P24 AG SERPL QL IA: NORMAL
HYALINE CASTS #/AREA URNS LPF: ABNORMAL /LPF
KETONES UR STRIP-MCNC: NEGATIVE MG/DL
LEUKOCYTE ESTERASE UR QL STRIP: ABNORMAL
NITRITE UR QL STRIP: NEGATIVE
NON-SQ EPI CELLS URNS QL MICRO: ABNORMAL /HPF
PH UR STRIP.AUTO: 8 [PH]
PROT UR STRIP-MCNC: ABNORMAL MG/DL
RBC #/AREA URNS AUTO: ABNORMAL /HPF
RPR SER QL: NORMAL
SP GR UR STRIP.AUTO: 1.02 (ref 1–1.03)
UROBILINOGEN UR QL STRIP.AUTO: 0.2 E.U./DL
WBC #/AREA URNS AUTO: ABNORMAL /HPF

## 2021-11-05 LAB
BACTERIA UR CULT: ABNORMAL
C TRACH DNA SPEC QL NAA+PROBE: NEGATIVE
N GONORRHOEA DNA SPEC QL NAA+PROBE: NEGATIVE

## 2021-12-06 ENCOUNTER — TELEPHONE (OUTPATIENT)
Dept: OBGYN CLINIC | Facility: CLINIC | Age: 26
End: 2021-12-06

## 2021-12-07 ENCOUNTER — TELEPHONE (OUTPATIENT)
Dept: OBGYN CLINIC | Facility: CLINIC | Age: 26
End: 2021-12-07

## 2022-01-12 ENCOUNTER — ULTRASOUND (OUTPATIENT)
Dept: OBGYN CLINIC | Facility: CLINIC | Age: 27
End: 2022-01-12

## 2022-01-12 VITALS
HEIGHT: 62 IN | HEART RATE: 69 BPM | BODY MASS INDEX: 34.78 KG/M2 | DIASTOLIC BLOOD PRESSURE: 82 MMHG | WEIGHT: 189 LBS | SYSTOLIC BLOOD PRESSURE: 123 MMHG

## 2022-01-12 DIAGNOSIS — Z34.90 EARLY STAGE OF PREGNANCY: Primary | ICD-10-CM

## 2022-01-12 PROCEDURE — 76801 OB US < 14 WKS SINGLE FETUS: CPT | Performed by: OBSTETRICS & GYNECOLOGY

## 2022-01-12 PROCEDURE — 99213 OFFICE O/P EST LOW 20 MIN: CPT | Performed by: OBSTETRICS & GYNECOLOGY

## 2022-02-12 ENCOUNTER — HOSPITAL ENCOUNTER (EMERGENCY)
Facility: HOSPITAL | Age: 27
Discharge: HOME | End: 2022-02-12
Attending: EMERGENCY MEDICINE | Admitting: EMERGENCY MEDICINE
Payer: COMMERCIAL

## 2022-02-12 VITALS
OXYGEN SATURATION: 100 % | TEMPERATURE: 98.3 F | BODY MASS INDEX: 34.84 KG/M2 | HEART RATE: 73 BPM | DIASTOLIC BLOOD PRESSURE: 82 MMHG | WEIGHT: 190.48 LBS | RESPIRATION RATE: 16 BRPM | SYSTOLIC BLOOD PRESSURE: 137 MMHG

## 2022-02-12 DIAGNOSIS — J45.901 ASTHMA EXACERBATION: ICD-10-CM

## 2022-02-12 DIAGNOSIS — J06.9 VIRAL URI: Primary | ICD-10-CM

## 2022-02-12 DIAGNOSIS — R51.9 HEADACHE: ICD-10-CM

## 2022-02-12 LAB
ALBUMIN SERPL BCP-MCNC: 3.4 G/DL (ref 3.5–5)
ALP SERPL-CCNC: 69 U/L (ref 46–116)
ALT SERPL W P-5'-P-CCNC: 21 U/L (ref 12–78)
ANION GAP SERPL CALCULATED.3IONS-SCNC: 8 MMOL/L (ref 4–13)
AST SERPL W P-5'-P-CCNC: 11 U/L (ref 5–45)
BASOPHILS # BLD AUTO: 0.02 THOUSANDS/ΜL (ref 0–0.1)
BASOPHILS NFR BLD AUTO: 0 % (ref 0–1)
BILIRUB SERPL-MCNC: 0.23 MG/DL (ref 0.2–1)
BILIRUB UR QL STRIP: NEGATIVE
BUN SERPL-MCNC: 6 MG/DL (ref 5–25)
CALCIUM ALBUM COR SERPL-MCNC: 9.5 MG/DL (ref 8.3–10.1)
CALCIUM SERPL-MCNC: 9 MG/DL (ref 8.3–10.1)
CHLORIDE SERPL-SCNC: 102 MMOL/L (ref 100–108)
CLARITY UR: CLEAR
CO2 SERPL-SCNC: 24 MMOL/L (ref 21–32)
COLOR UR: YELLOW
CREAT SERPL-MCNC: 0.47 MG/DL (ref 0.6–1.3)
EOSINOPHIL # BLD AUTO: 0.14 THOUSAND/ΜL (ref 0–0.61)
EOSINOPHIL NFR BLD AUTO: 2 % (ref 0–6)
ERYTHROCYTE [DISTWIDTH] IN BLOOD BY AUTOMATED COUNT: 15.2 % (ref 11.6–15.1)
GFR SERPL CREATININE-BSD FRML MDRD: 134 ML/MIN/1.73SQ M
GLUCOSE SERPL-MCNC: 82 MG/DL (ref 65–140)
GLUCOSE UR STRIP-MCNC: NEGATIVE MG/DL
HCT VFR BLD AUTO: 38.2 % (ref 34.8–46.1)
HGB BLD-MCNC: 12.2 G/DL (ref 11.5–15.4)
HGB UR QL STRIP.AUTO: NEGATIVE
IMM GRANULOCYTES # BLD AUTO: 0.02 THOUSAND/UL (ref 0–0.2)
IMM GRANULOCYTES NFR BLD AUTO: 0 % (ref 0–2)
KETONES UR STRIP-MCNC: ABNORMAL MG/DL
LEUKOCYTE ESTERASE UR QL STRIP: NEGATIVE
LYMPHOCYTES # BLD AUTO: 1.24 THOUSANDS/ΜL (ref 0.6–4.47)
LYMPHOCYTES NFR BLD AUTO: 19 % (ref 14–44)
MCH RBC QN AUTO: 26.6 PG (ref 26.8–34.3)
MCHC RBC AUTO-ENTMCNC: 31.9 G/DL (ref 31.4–37.4)
MCV RBC AUTO: 83 FL (ref 82–98)
MONOCYTES # BLD AUTO: 0.55 THOUSAND/ΜL (ref 0.17–1.22)
MONOCYTES NFR BLD AUTO: 8 % (ref 4–12)
NEUTROPHILS # BLD AUTO: 4.66 THOUSANDS/ΜL (ref 1.85–7.62)
NEUTS SEG NFR BLD AUTO: 71 % (ref 43–75)
NITRITE UR QL STRIP: NEGATIVE
NRBC BLD AUTO-RTO: 0 /100 WBCS
PH UR STRIP.AUTO: 7 [PH] (ref 4.5–8)
PLATELET # BLD AUTO: 261 THOUSANDS/UL (ref 149–390)
PMV BLD AUTO: 11.5 FL (ref 8.9–12.7)
POTASSIUM SERPL-SCNC: 3.6 MMOL/L (ref 3.5–5.3)
PROT SERPL-MCNC: 7.7 G/DL (ref 6.4–8.2)
PROT UR STRIP-MCNC: NEGATIVE MG/DL
RBC # BLD AUTO: 4.58 MILLION/UL (ref 3.81–5.12)
SODIUM SERPL-SCNC: 134 MMOL/L (ref 136–145)
SP GR UR STRIP.AUTO: 1.02 (ref 1–1.03)
UROBILINOGEN UR QL STRIP.AUTO: 0.2 E.U./DL
WBC # BLD AUTO: 6.63 THOUSAND/UL (ref 4.31–10.16)

## 2022-02-12 PROCEDURE — 96374 THER/PROPH/DIAG INJ IV PUSH: CPT

## 2022-02-12 PROCEDURE — 80053 COMPREHEN METABOLIC PANEL: CPT | Performed by: PHYSICIAN ASSISTANT

## 2022-02-12 PROCEDURE — 36415 COLL VENOUS BLD VENIPUNCTURE: CPT | Performed by: PHYSICIAN ASSISTANT

## 2022-02-12 PROCEDURE — 99284 EMERGENCY DEPT VISIT MOD MDM: CPT | Performed by: PHYSICIAN ASSISTANT

## 2022-02-12 PROCEDURE — 81003 URINALYSIS AUTO W/O SCOPE: CPT

## 2022-02-12 PROCEDURE — 85025 COMPLETE CBC W/AUTO DIFF WBC: CPT | Performed by: PHYSICIAN ASSISTANT

## 2022-02-12 PROCEDURE — 96361 HYDRATE IV INFUSION ADD-ON: CPT

## 2022-02-12 PROCEDURE — 96375 TX/PRO/DX INJ NEW DRUG ADDON: CPT

## 2022-02-12 PROCEDURE — 99283 EMERGENCY DEPT VISIT LOW MDM: CPT

## 2022-02-12 RX ORDER — ALBUTEROL SULFATE 90 UG/1
2 AEROSOL, METERED RESPIRATORY (INHALATION) ONCE
Status: COMPLETED | OUTPATIENT
Start: 2022-02-12 | End: 2022-02-12

## 2022-02-12 RX ORDER — METOCLOPRAMIDE HYDROCHLORIDE 5 MG/ML
10 INJECTION INTRAMUSCULAR; INTRAVENOUS ONCE
Status: COMPLETED | OUTPATIENT
Start: 2022-02-12 | End: 2022-02-12

## 2022-02-12 RX ORDER — DIPHENHYDRAMINE HYDROCHLORIDE 50 MG/ML
25 INJECTION INTRAMUSCULAR; INTRAVENOUS ONCE
Status: COMPLETED | OUTPATIENT
Start: 2022-02-12 | End: 2022-02-12

## 2022-02-12 RX ORDER — ACETAMINOPHEN 325 MG/1
650 TABLET ORAL ONCE
Status: COMPLETED | OUTPATIENT
Start: 2022-02-12 | End: 2022-02-12

## 2022-02-12 RX ADMIN — ACETAMINOPHEN 650 MG: 325 TABLET, FILM COATED ORAL at 10:48

## 2022-02-12 RX ADMIN — DIPHENHYDRAMINE HYDROCHLORIDE 25 MG: 50 INJECTION, SOLUTION INTRAMUSCULAR; INTRAVENOUS at 10:46

## 2022-02-12 RX ADMIN — ALBUTEROL SULFATE 2 PUFF: 90 AEROSOL, METERED RESPIRATORY (INHALATION) at 10:24

## 2022-02-12 RX ADMIN — SODIUM CHLORIDE 1000 ML: 0.9 INJECTION, SOLUTION INTRAVENOUS at 10:41

## 2022-02-12 RX ADMIN — METOCLOPRAMIDE 10 MG: 5 INJECTION, SOLUTION INTRAMUSCULAR; INTRAVENOUS at 10:43

## 2022-02-12 NOTE — ED PROVIDER NOTES
History  Chief Complaint   Patient presents with    Asthma     patient with worsening asthma symtoms, no meds at home    Headache     for approx 2 weeks     Patient is a 70-year-old female [address] [facility] currently 13 weeks pregnant who presents for evaluation of URI symptoms, headache and asthma exacerbation  Patient states she does have a history of asthma and migraines  She states her symptoms started about 2 weeks ago  She complains of runny nose/congestion, sore throat and a cough  She states she has had some asthma symptoms over the last week  She complains of tightness and intermittent wheezing  She does not have any albuterol inhaler at home  She has been out for a few months  She states she only gets asthma when she is sick with a cold  She states she has been hospitalized for asthma but not in many years  She has never been intubated for asthma  She complains of a frontal headache and she has been taking Tylenol without significant relief  She does follow up as directed with her OB  She did have a confirmatory ultrasound  She states occasionally she does have some issues with elevated blood pressure in pregnancy but has never been on medications and no hx of eclampsia  She denies fever, chills, chest pain, abdominal pain/pelvic pain, dysuria hematuria, vaginal bleeding or discharge, leakage of fluids, leg swelling, vision changes, numbness weakness  Prior to Admission Medications   Prescriptions Last Dose Informant Patient Reported? Taking?    albuterol (PROVENTIL HFA,VENTOLIN HFA) 90 mcg/act inhaler   No No   Sig: Inhale 1-2 puffs every 6 (six) hours as needed for wheezing or shortness of breath      Facility-Administered Medications: None       Past Medical History:   Diagnosis Date    Asthma        Past Surgical History:   Procedure Laterality Date     SECTION      TX  DELIVERY ONLY N/A 2018    Procedure:  SECTION () REPEAT;  Surgeon: Naomi Hill DO; Location: [facility];  Service: Obstetrics       Family History   Problem Relation Age of Onset    No Known Problems Mother     No Known Problems Father     No Known Problems Sister     No Known Problems Brother      I have reviewed and agree with the history as documented  E-Cigarette/Vaping    E-Cigarette Use Never User      E-Cigarette/Vaping Substances    Nicotine No     THC No     CBD No     Flavoring No     Other No     Unknown No      Social History     Tobacco Use    Smoking status: Never Smoker    Smokeless tobacco: Never Used   Vaping Use    Vaping Use: Never used   Substance Use Topics    Alcohol use: No    Drug use: Yes     Types: Marijuana     Comment: used daily x 5 years  last used 11/2018       Review of Systems   Constitutional: Negative for chills and fever  HENT: Positive for congestion, ear pain and sore throat  Respiratory: Positive for cough, chest tightness and wheezing  Cardiovascular: Negative for chest pain  Gastrointestinal: Negative for abdominal pain, diarrhea, nausea and vomiting  Genitourinary: Negative for difficulty urinating, dysuria, flank pain, frequency, pelvic pain, vaginal bleeding, vaginal discharge and vaginal pain  Skin: Negative for rash  Neurological: Positive for headaches  Negative for syncope, weakness and numbness  All other systems reviewed and are negative  Physical Exam  Physical Exam  Vitals and nursing note reviewed  Constitutional:       General: She is not in acute distress  Appearance: Normal appearance  She is normal weight  She is not ill-appearing, toxic-appearing or diaphoretic  HENT:      Head: Normocephalic and atraumatic  Right Ear: Tympanic membrane, ear canal and external ear normal       Left Ear: Tympanic membrane, ear canal and external ear normal       Nose: Congestion and rhinorrhea present  Mouth/Throat:      Lips: Pink  No lesions  Mouth: Mucous membranes are moist  No oral lesions  Tongue: No lesions  Pharynx: Oropharynx is clear  Posterior oropharyngeal erythema present  No oropharyngeal exudate  Eyes:      Conjunctiva/sclera: Conjunctivae normal    Cardiovascular:      Rate and Rhythm: Normal rate and regular rhythm  Heart sounds: Normal heart sounds  Pulmonary:      Effort: Pulmonary effort is normal  No respiratory distress  Breath sounds: Normal breath sounds  No stridor  No wheezing or rhonchi  Abdominal:      General: Abdomen is flat  Bowel sounds are normal  There is no distension  Palpations: Abdomen is soft  There is no mass  Tenderness: There is no abdominal tenderness  There is no guarding  Musculoskeletal:         General: Normal range of motion  Cervical back: Normal range of motion  Skin:     General: Skin is warm and dry  Neurological:      General: No focal deficit present  Mental Status: She is alert     Psychiatric:         Mood and Affect: Mood normal          Vital Signs  ED Triage Vitals   Temperature Pulse Respirations Blood Pressure SpO2   02/12/22 0948 02/12/22 0948 02/12/22 0948 02/12/22 0950 02/12/22 0948   98 3 °F (36 8 °C) 86 18 146/75 100 %      Temp Source Heart Rate Source Patient Position - Orthostatic VS BP Location FiO2 (%)   02/12/22 0948 02/12/22 0948 02/12/22 1052 02/12/22 1052 --   Oral Monitor Lying Left arm       Pain Score       02/12/22 1052       8           Vitals:    02/12/22 0948 02/12/22 0950 02/12/22 1052 02/12/22 1307   BP:  146/75 136/79 137/82   Pulse: 86  78 73   Patient Position - Orthostatic VS:   Lying Lying         Visual Acuity  Visual Acuity      Most Recent Value   L Pupil Size (mm) 3   R Pupil Size (mm) 3          ED Medications  Medications   albuterol (PROVENTIL HFA,VENTOLIN HFA) inhaler 2 puff (2 puffs Inhalation Given 2/12/22 1024)   sodium chloride 0 9 % bolus 1,000 mL (0 mL Intravenous Stopped 2/12/22 1203)   metoclopramide (REGLAN) injection 10 mg (10 mg Intravenous Given 2/12/22 1043)   diphenhydrAMINE (BENADRYL) injection 25 mg (25 mg Intravenous Given 2/12/22 1046)   acetaminophen (TYLENOL) tablet 650 mg (650 mg Oral Given 2/12/22 1048)       Diagnostic Studies  Results Reviewed     Procedure Component Value Units Date/Time    Urine Macroscopic, POC [[id number]]  (Abnormal) Collected: 02/12/22 1141    Lab Status: Final result Specimen: Urine Updated: 02/12/22 1143     Color, UA Yellow     Clarity, UA Clear     pH, UA 7 0     Leukocytes, UA Negative     Nitrite, UA Negative     Protein, UA Negative mg/dl      Glucose, UA Negative mg/dl      Ketones, UA 40 (2+) mg/dl      Urobilinogen, UA 0 2 E U /dl      Bilirubin, UA Negative     Blood, UA Negative     Specific Gravity, UA 1 025    Narrative:      CLINITEK RESULT    Comprehensive metabolic panel [[id number]]  (Abnormal) Collected: 02/12/22 1037    Lab Status: Final result Specimen: Blood from Arm, Right Updated: 02/12/22 1112     Sodium 134 mmol/L      Potassium 3 6 mmol/L      Chloride 102 mmol/L      CO2 24 mmol/L      ANION GAP 8 mmol/L      BUN 6 mg/dL      Creatinine 0 47 mg/dL      Glucose 82 mg/dL      Calcium 9 0 mg/dL      Corrected Calcium 9 5 mg/dL      AST 11 U/L      ALT 21 U/L      Alkaline Phosphatase 69 U/L      Total Protein 7 7 g/dL      Albumin 3 4 g/dL      Total Bilirubin 0 23 mg/dL      eGFR 134 ml/min/1 73sq m     Narrative:      [place] guidelines for Chronic Kidney Disease (CKD):     Stage 1 with normal or high GFR (GFR > 90 mL/min/1 73 square meters)    Stage 2 Mild CKD (GFR = 60-89 mL/min/1 73 square meters)    Stage 3A Moderate CKD (GFR = 45-59 mL/min/1 73 square meters)    Stage 3B Moderate CKD (GFR = 30-44 mL/min/1 73 square meters)    Stage 4 Severe CKD (GFR = 15-29 mL/min/1 73 square meters)    Stage 5 End Stage CKD (GFR <15 mL/min/1 73 square meters)  Note: GFR calculation is accurate only with a steady state creatinine    CBC and differential [[id number]]  (Abnormal) Collected: 02/12/22 1037    Lab Status: Final result Specimen: Blood from Arm, Right Updated: 02/12/22 1043     WBC 6 63 Thousand/uL      RBC 4 58 Million/uL      Hemoglobin 12 2 g/dL      Hematocrit 38 2 %      MCV 83 fL      MCH 26 6 pg      MCHC 31 9 g/dL      RDW 15 2 %      MPV 11 5 fL      Platelets 101 Thousands/uL      nRBC 0 /100 WBCs      Neutrophils Relative 71 %      Immat GRANS % 0 %      Lymphocytes Relative 19 %      Monocytes Relative 8 %      Eosinophils Relative 2 %      Basophils Relative 0 %      Neutrophils Absolute 4 66 Thousands/µL      Immature Grans Absolute 0 02 Thousand/uL      Lymphocytes Absolute 1 24 Thousands/µL      Monocytes Absolute 0 55 Thousand/µL      Eosinophils Absolute 0 14 Thousand/µL      Basophils Absolute 0 02 Thousands/µL                  No orders to display              Procedures  Procedures         ED Course                                             MDM  Number of Diagnoses or Management Options  Asthma exacerbation  Headache  Viral URI  Diagnosis management comments: 1/12/22- Transvaginal Pelvic Ultrasound  C[place]rera IUP, viable, LMP is working for dating  CRL 18 6 mm, consistent with EGA 8w3d  +yolk sac  +cardiac activity   bpm  No adnexal masses appreciated    Plan- will repeat blood pressure, will check FHT  Check basic labs and give IVF, benadryl, reglan and tylenol for migraine headache  Will give albuterol inhaler here for tightness/asthma exacerbation in relation to her URI  Patient refuses covid/influenza testing  Patient does have a ride home present  FHT obtained by RN and 166bpm    Repeat BP improved to 136/79  Lab evaluation unremarkable, sodium 134  UA with 2+ ketones however no protein, glucose, or sign of infection  Discussed results with patient  Patient states she feels much better after medications here  No longer with HA and Chest without tightness after albuterol  Vitals stable     Discussed with OBGYN resident- Parvez Rodriugez no further evaluation needed at this time  Follow up outpatient with OBGYN  Discussed plan with patient  Patient states understanding and agrees with plan  She is well appearing, non toxic and in NAD at time of discharge  Amount and/or Complexity of Data Reviewed  Clinical lab tests: ordered and reviewed        Disposition  Final diagnoses:   Viral URI   Asthma exacerbation   Headache     Time reflects when diagnosis was documented in both MDM as applicable and the Disposition within this note     Time User Action Codes Description Comment    2/12/2022 12:22 PM Malelayla Nogueraena Add [J06 9] Viral URI     2/12/2022 12:22 PM Malelayla oNgueraena Add [J45 901] Asthma exacerbation     2/12/2022 12:22 PM Andrews Nogueraena Add [R51 9] Headache       ED Disposition     ED Disposition Condition Date/Time Comment    Discharge Stable Sat Feb 12, 2022 12:22 PM [place] discharge to home/self care              Follow-up Information     Follow up With Specialties Details Why Contact Info Additional Information    Helene Avila MD Family Medicine Schedule an appointment as soon as possible for a visit in 1 day  [address] [facility] [address] [address] Obstetrics and Gynecology Schedule an appointment as soon as possible for a visit   [address] [facility] [address] [facility] [address]  [address], [address] [place] [address], [address] [place], [place], [address] [facility] Emergency Department Emergency Medicine  If symptoms worsen [facility] [id number]  [address] [place] [address] Emergency Department, [address] [place] [address], [id number]          Discharge Medication List as of 2/12/2022 12:30 PM      CONTINUE these medications which have NOT CHANGED    Details   albuterol (PROVENTIL HFA,VENTOLIN HFA) 90 mcg/act inhaler Inhale 1-2 puffs every 6 (six) hours as needed for wheezing or shortness of breath, Starting Thu 1/23/2020, Normal             No discharge procedures on file      PDMP Review     None          ED Provider  Electronically Signed by           Stephanie Mayfield PA-C  02/12/22 2227

## 2022-04-11 NOTE — PROGRESS NOTES
Please refer to the [facility] ultrasound report in Ob Procedures for additional information regarding today's visit

## 2022-04-12 ENCOUNTER — ROUTINE PRENATAL (OUTPATIENT)
Dept: PERINATAL CARE | Facility: OTHER | Age: 27
End: 2022-04-12
Payer: COMMERCIAL

## 2022-04-12 ENCOUNTER — TELEPHONE (OUTPATIENT)
Dept: PERINATAL CARE | Facility: OTHER | Age: 27
End: 2022-04-12

## 2022-04-12 VITALS
SYSTOLIC BLOOD PRESSURE: 142 MMHG | WEIGHT: 187.6 LBS | DIASTOLIC BLOOD PRESSURE: 82 MMHG | BODY MASS INDEX: 34.52 KG/M2 | HEIGHT: 62 IN | HEART RATE: 110 BPM

## 2022-04-12 DIAGNOSIS — Z36.3 ENCOUNTER FOR ANTENATAL SCREENING FOR MALFORMATIONS: ICD-10-CM

## 2022-04-12 DIAGNOSIS — Z3A.21 21 WEEKS GESTATION OF PREGNANCY: ICD-10-CM

## 2022-04-12 DIAGNOSIS — Z87.59 HISTORY OF GESTATIONAL HYPERTENSION: ICD-10-CM

## 2022-04-12 DIAGNOSIS — Z36.86 ENCOUNTER FOR ANTENATAL SCREENING FOR CERVICAL LENGTH: ICD-10-CM

## 2022-04-12 DIAGNOSIS — Z98.891 HISTORY OF 3 CESAREAN SECTIONS: ICD-10-CM

## 2022-04-12 DIAGNOSIS — O99.212 MATERNAL OBESITY, ANTEPARTUM, SECOND TRIMESTER: Primary | ICD-10-CM

## 2022-04-12 PROCEDURE — 76811 OB US DETAILED SNGL FETUS: CPT | Performed by: OBSTETRICS & GYNECOLOGY

## 2022-04-12 PROCEDURE — 99242 OFF/OP CONSLTJ NEW/EST SF 20: CPT | Performed by: OBSTETRICS & GYNECOLOGY

## 2022-04-12 PROCEDURE — 76817 TRANSVAGINAL US OBSTETRIC: CPT | Performed by: OBSTETRICS & GYNECOLOGY

## 2022-04-12 RX ORDER — ASPIRIN 81 MG/1
162 TABLET, CHEWABLE ORAL DAILY
Qty: 180 TABLET | Refills: 1 | Status: SHIPPED | OUTPATIENT
Start: 2022-04-12 | End: 2022-08-10

## 2022-04-12 NOTE — TELEPHONE ENCOUNTER
Spoke with patient and confirmed her MFM appointment time had to be rescheduled  Patient verbalized understanding of new time, date and location of appointment  Patient denies further questions

## 2022-04-12 NOTE — PROGRESS NOTES
Ultrasound Probe Disinfection    A transvaginal ultrasound was performed  Prior to use, disinfection was performed with High Level Disinfection Process (Trophon)  Probe serial number S2: [id number] was used        Nilsa Goncalves  04/12/22  3:52 PM

## 2022-04-12 NOTE — LETTER
April 12, 2022     [facility]  [address] [facility]  [address]  [id number]    Patient: Aaron Patch   YOB: 1995   Date of Visit: 4/12/2022       Dear Dr Martinez:    Thank you for referring Jameson Michael to me for evaluation  Below are my notes for this consultation  If you have questions, please do not hesitate to call me  I look forward to following your patient along with you  Sincerely,        Megan Del Toro MD        CC: No Recipients  Megan Del Toro MD  4/11/2022  7:42 AM  Sign when Signing Visit  Please refer to the [facility] ultrasound report in Ob Procedures for additional information regarding today's visit

## 2022-05-25 ENCOUNTER — TELEPHONE (OUTPATIENT)
Dept: PERINATAL CARE | Facility: OTHER | Age: 27
End: 2022-05-25

## 2022-05-25 NOTE — TELEPHONE ENCOUNTER
UNABLE TO REACH PT BY PHONE - VOICEMAIL NOT SET UP - SENT PT MESSAGE IN [facility]    Left patient a message that her MFM appointment had to be rescheduled  The new time, date and location were provided - 6/1/22  10:15  [facility]  The patient has been instructed to please call us back at [phone] with any questions or concerns

## 2022-06-06 NOTE — PATIENT INSTRUCTIONS
Thank you for choosing us for your  care today  If you have any questions about your ultrasound or care, please do not hesitate to contact us or your primary obstetrician  Please call your primary OB for an appointment ASAP  Some general instructions for your pregnancy are:    Protect against coronavirus: get vaccinated - pregnant women are increased risk of severe COVID  Notify your primary care doctor if you have any symptoms  Exercise: Aim for 22 minutes per day (150 minutes per week) of regular exercise  Walking is great! Nutrition: aim for calcium-rich and iron-rich foods as well as healthy sources of protein  Learn about Preeclampsia: preeclampsia is a common, serious high blood pressure complication in pregnancy  A blood pressure of 554UXCS (systolic or top number) or 30JGTY (diastolic or bottom number) is not normal and needs evaluation by your doctor  Aspirin is sometimes prescribed in early pregnancy to prevent preeclampsia in women with risk factors - ask your obstetrician if you should be on this medication  If you smoke, try to reduce how many cigarettes you smoke or try to quit completely  Do not vape  Other warning signs to watch out for in pregnancy or postpartum: chest pain, obstructed breathing or shortness of breath, seizures, thoughts of hurting yourself or your baby, bleeding, a painful or swollen leg, fever, or headache (see [employer] POST-BIRTH Warning Signs campaign)  If these happen call 911  Itching is also not normal in pregnancy and if you experience this, especially over your hands and feet, potentially worse at night, notify your doctors

## 2022-06-09 ENCOUNTER — ULTRASOUND (OUTPATIENT)
Dept: PERINATAL CARE | Facility: OTHER | Age: 27
End: 2022-06-09
Payer: COMMERCIAL

## 2022-06-09 VITALS
WEIGHT: 194 LBS | HEIGHT: 62 IN | DIASTOLIC BLOOD PRESSURE: 81 MMHG | BODY MASS INDEX: 35.7 KG/M2 | HEART RATE: 88 BPM | SYSTOLIC BLOOD PRESSURE: 129 MMHG

## 2022-06-09 DIAGNOSIS — Z98.891 HISTORY OF 3 CESAREAN SECTIONS: ICD-10-CM

## 2022-06-09 DIAGNOSIS — O36.63X0 FETAL MACROSOMIA DURING PREGNANCY IN THIRD TRIMESTER, SINGLE OR UNSPECIFIED FETUS: Primary | ICD-10-CM

## 2022-06-09 DIAGNOSIS — Z36.89 ENCOUNTER FOR ULTRASOUND TO CHECK FETAL GROWTH: ICD-10-CM

## 2022-06-09 DIAGNOSIS — Z36.2 ENCOUNTER FOR FOLLOW-UP ULTRASOUND OF FETAL ANATOMY: ICD-10-CM

## 2022-06-09 DIAGNOSIS — O40.3XX0 POLYHYDRAMNIOS IN THIRD TRIMESTER COMPLICATION, SINGLE OR UNSPECIFIED FETUS: ICD-10-CM

## 2022-06-09 PROCEDURE — 76816 OB US FOLLOW-UP PER FETUS: CPT | Performed by: OBSTETRICS & GYNECOLOGY

## 2022-06-09 PROCEDURE — 99213 OFFICE O/P EST LOW 20 MIN: CPT | Performed by: OBSTETRICS & GYNECOLOGY

## 2022-06-10 NOTE — PROGRESS NOTES
[address]: Ms Jean Claude Palacio was seen today for fetal growth assessment ultrasound  See ultrasound report under "OB Procedures" tab  The time spent on this established patient on the encounter date included 5 minutes previsit service time reviewing records and precharting, 10 minutes face-to-face service time counseling regarding results and coordinating care, and  5 minutes charting, totalling 20 minutes  Please don't hesitate to contact our office with any concerns or questions    Heather Hensley MD

## 2022-06-29 ENCOUNTER — APPOINTMENT (OUTPATIENT)
Dept: LAB | Facility: MEDICAL CENTER | Age: 27
End: 2022-06-29
Payer: COMMERCIAL

## 2022-06-29 DIAGNOSIS — O40.3XX0 POLYHYDRAMNIOS IN THIRD TRIMESTER COMPLICATION, SINGLE OR UNSPECIFIED FETUS: ICD-10-CM

## 2022-06-29 LAB — GLUCOSE 1H P 50 G GLC PO SERPL-MCNC: 84 MG/DL (ref 40–134)

## 2022-06-29 PROCEDURE — 82950 GLUCOSE TEST: CPT

## 2022-06-29 PROCEDURE — 36415 COLL VENOUS BLD VENIPUNCTURE: CPT

## 2022-06-30 PROBLEM — O09.893 NON-COMPLIANT PREGNANT PATIENT, THIRD TRIMESTER: Status: ACTIVE | Noted: 2022-06-30

## 2022-06-30 PROBLEM — Z3A.32 32 WEEKS GESTATION OF PREGNANCY: Status: ACTIVE | Noted: 2022-06-30

## 2022-06-30 PROBLEM — O34.219 PREVIOUS CESAREAN SECTION COMPLICATING PREGNANCY: Status: ACTIVE | Noted: 2018-02-13

## 2022-06-30 PROBLEM — Z91.19 NON-COMPLIANT PREGNANT PATIENT, THIRD TRIMESTER: Status: ACTIVE | Noted: 2022-06-30

## 2022-06-30 PROBLEM — Z91.199 NON-COMPLIANT PREGNANT PATIENT, THIRD TRIMESTER: Status: ACTIVE | Noted: 2022-06-30

## 2022-07-06 ENCOUNTER — ROUTINE PRENATAL (OUTPATIENT)
Dept: OBGYN CLINIC | Facility: CLINIC | Age: 27
End: 2022-07-06
Payer: COMMERCIAL

## 2022-07-06 VITALS
BODY MASS INDEX: 36.8 KG/M2 | SYSTOLIC BLOOD PRESSURE: 134 MMHG | WEIGHT: 200 LBS | DIASTOLIC BLOOD PRESSURE: 79 MMHG | HEIGHT: 62 IN

## 2022-07-06 DIAGNOSIS — Z3A.32 32 WEEKS GESTATION OF PREGNANCY: ICD-10-CM

## 2022-07-06 DIAGNOSIS — Z91.199 NON-COMPLIANT PREGNANT PATIENT, THIRD TRIMESTER: ICD-10-CM

## 2022-07-06 DIAGNOSIS — Z11.3 ROUTINE SCREENING FOR STI (SEXUALLY TRANSMITTED INFECTION): ICD-10-CM

## 2022-07-06 DIAGNOSIS — O40.3XX0 POLYHYDRAMNIOS IN THIRD TRIMESTER COMPLICATION, SINGLE OR UNSPECIFIED FETUS: Primary | ICD-10-CM

## 2022-07-06 DIAGNOSIS — O36.63X0 FETAL MACROSOMIA DURING PREGNANCY IN THIRD TRIMESTER, SINGLE OR UNSPECIFIED FETUS: ICD-10-CM

## 2022-07-06 DIAGNOSIS — Z3A.33 33 WEEKS GESTATION OF PREGNANCY: ICD-10-CM

## 2022-07-06 DIAGNOSIS — O34.219 PREVIOUS CESAREAN SECTION COMPLICATING PREGNANCY: ICD-10-CM

## 2022-07-06 DIAGNOSIS — O09.893 NON-COMPLIANT PREGNANT PATIENT, THIRD TRIMESTER: ICD-10-CM

## 2022-07-06 PROCEDURE — 0500F INITIAL PRENATAL CARE VISIT: CPT | Performed by: NURSE PRACTITIONER

## 2022-07-06 PROCEDURE — 87491 CHLMYD TRACH DNA AMP PROBE: CPT | Performed by: NURSE PRACTITIONER

## 2022-07-06 PROCEDURE — 99215 OFFICE O/P EST HI 40 MIN: CPT | Performed by: NURSE PRACTITIONER

## 2022-07-06 PROCEDURE — 87591 N.GONORRHOEAE DNA AMP PROB: CPT | Performed by: NURSE PRACTITIONER

## 2022-07-06 PROCEDURE — 0502F SUBSEQUENT PRENATAL CARE: CPT | Performed by: NURSE PRACTITIONER

## 2022-07-06 NOTE — PROGRESS NOTES
Prenatal H&P @ 33w 2d      Denies loss of fluid, vaginal discharge, vaginal bleeding  and abdominal pain  Confirms fetal movement  Tolerating PNV and low-dose aspirin well  Has not needed albuterol inhaler  Has not completed prenatal panel  Completed 1 hour glucose-WNL  Pregnancy complicated by possible fetal macrosomia and polyhydramnios  Has repeat US scheduled  Reviewed recommendation for Tdap, patient declines  Patient had initial viability scan 2021 @ [facility]  Has been following with  Center  Has not had any other OB appointments  Patient plans include repeat  section, circumcision for infant, formula feeding and uncertain regarding postpartum contraception     OB history:     G1- 12 term male  for fetal intolerance to labor; complicated by excessive bleeding questionable postpartum hemorrhage patient unsure  And gestational hypertension     G2- 1/27/15 term male repeat  section complicated by excessive bleeding and gestational hypertension     G3- 18 term female repeat  section denies complications     [id number]    Dating:     LMP - 11/15/21 IDA 22     US on 22 @  21w 1d IDA 22  Working IDA 22     Surgical history:      section x3     Medical History:     Asthma and obesity    Social history:     Alcohol/ tobacco/ illicit drug -no alcohol use since positive pregnancy test/denies drug or tobacco use     Denies history of STD/STI       Plan:  - Continue prenatal vitamin daily  - fetal kick counts reviewed, encouraged daily and written information provided  - encouraged to complete prenatal panel  Gonorrhea/chlamydia collected today  - fetal macrosomia/polyhydramnios follow-up ultrasound scheduled 22  - Repeat  section  Uncertain regarding postpartum contraception    Will send message to surgical scheduler for scheduling  -history of gestational hypertension elevated blood pressure  @ 21w 143/103 repeat 142/82  BP today- 134/79  - declines Tdap vaccine  - 28 week folder provided  - Unit regimen reviewed visit every 2 weeks until 36 weeks and then weekly until delivery  Reviewed importance of prenatal care  -  Common discomforts of pregnancy and precautions reviewed  Signs and symptoms to report reviewed  Encouraged social distancing, good hand hygiene, masking, avoiding crowds and written information provided about COVID-19    RTO 4 weeks

## 2022-07-07 ENCOUNTER — TELEPHONE (OUTPATIENT)
Dept: OBGYN CLINIC | Facility: MEDICAL CENTER | Age: 27
End: 2022-07-07

## 2022-07-07 LAB
C TRACH DNA SPEC QL NAA+PROBE: NEGATIVE
N GONORRHOEA DNA SPEC QL NAA+PROBE: NEGATIVE

## 2022-07-07 NOTE — TELEPHONE ENCOUNTER
----- Message from [employer] [address] sent at 2022 11:29 AM EDT -----  Hi     Pt will need repeat  scheduled for 39 weeks  She late care @ 33 weeks  This will be her 4th    [facility] 22  39 weeks 8/15/22      Thank you

## 2022-07-08 ENCOUNTER — TELEPHONE (OUTPATIENT)
Dept: PERINATAL CARE | Facility: OTHER | Age: 27
End: 2022-07-08

## 2022-07-08 NOTE — TELEPHONE ENCOUNTER
Called patient to reschedule follow up appointment cancelled in [facility]:    Appointment canceled for [facility] BLANCA ([id number])   Visit Type: ULTRASOUND PG   Date        Time      Length    Provider                  Department   2022    3:00 PM  45 mins    US 2 [address]      Reason for Cancellation: Lack of Transportation      Patient Comments: I'd like to reschedule if possible  Spoke with patient and scheduled appointment - 22  9:00  COBY

## 2022-08-04 ENCOUNTER — TELEPHONE (OUTPATIENT)
Dept: OBGYN CLINIC | Facility: MEDICAL CENTER | Age: 27
End: 2022-08-04

## 2022-08-04 NOTE — TELEPHONE ENCOUNTER
----- Message from Sachi Franklin sent at 2022  3:10 PM EDT -----  Regarding: FW: Patient     ----- Message -----  From: Gilma Hardy  Sent: 2022  12:42 PM EDT  To: [address] [facility], #  Subject: Patient                                 Good Afternoon,    The patient contacted our office today to inquire about her induction/csection  She appears to be a patient of your practice  We did tell her to contact your office but I thought I should make you aware  Thank you

## 2022-08-10 PROBLEM — Z3A.38 38 WEEKS GESTATION OF PREGNANCY: Status: ACTIVE | Noted: 2022-06-30

## 2022-08-10 PROBLEM — O34.219 PREVIOUS CESAREAN SECTION COMPLICATING PREGNANCY: Status: RESOLVED | Noted: 2018-02-13 | Resolved: 2022-08-10

## 2022-08-12 ENCOUNTER — ROUTINE PRENATAL (OUTPATIENT)
Dept: OBGYN CLINIC | Facility: MEDICAL CENTER | Age: 27
End: 2022-08-12
Payer: COMMERCIAL

## 2022-08-12 VITALS — BODY MASS INDEX: 37.13 KG/M2 | WEIGHT: 203 LBS | DIASTOLIC BLOOD PRESSURE: 80 MMHG | SYSTOLIC BLOOD PRESSURE: 127 MMHG

## 2022-08-12 DIAGNOSIS — Z34.83 ENCOUNTER FOR SUPERVISION OF OTHER NORMAL PREGNANCY IN THIRD TRIMESTER: ICD-10-CM

## 2022-08-12 DIAGNOSIS — O36.63X0 FETAL MACROSOMIA DURING PREGNANCY IN THIRD TRIMESTER, SINGLE OR UNSPECIFIED FETUS: ICD-10-CM

## 2022-08-12 DIAGNOSIS — O09.893 NON-COMPLIANT PREGNANT PATIENT, THIRD TRIMESTER: ICD-10-CM

## 2022-08-12 DIAGNOSIS — Z91.199 NON-COMPLIANT PREGNANT PATIENT, THIRD TRIMESTER: ICD-10-CM

## 2022-08-12 DIAGNOSIS — Z3A.38 38 WEEKS GESTATION OF PREGNANCY: Primary | ICD-10-CM

## 2022-08-12 DIAGNOSIS — O40.3XX0 POLYHYDRAMNIOS IN THIRD TRIMESTER COMPLICATION, SINGLE OR UNSPECIFIED FETUS: ICD-10-CM

## 2022-08-12 PROCEDURE — 99214 OFFICE O/P EST MOD 30 MIN: CPT | Performed by: OBSTETRICS & GYNECOLOGY

## 2022-08-12 PROCEDURE — 87150 DNA/RNA AMPLIFIED PROBE: CPT | Performed by: OBSTETRICS & GYNECOLOGY

## 2022-08-12 NOTE — PATIENT INSTRUCTIONS
Fetal Movement   WHAT YOU NEED TO KNOW:   Fetal movements are the kicks, rolls, and hiccups of your unborn baby  You may start to feel these movements when you are 20 weeks pregnant  The movements grow stronger and more frequent as your baby grows  Fetal movements show that your unborn baby is getting the oxygen and nutrients he or she needs before birth  Fewer fetal movements may signal a problem with your baby's health  DISCHARGE INSTRUCTIONS:   Follow up with your doctor or obstetrician as directed:  Write down your questions so you remember to ask them during your visits  Normal fetal movement:  Fetal activity can be described by 4 states, from least to most active  During quiet sleep, your unborn baby may be still for up to 2 hours  During active sleep, he or she kicks, rolls, and moves often  During the quiet awake state, he or she may only move his or her eyes  The active awake state includes strong kicks and rolls  What affects fetal movement:  You may feel your baby move more after you eat, or after you drink caffeine  You may feel your baby move less while you are more active, such as when you exercise  You may also feel fewer movements if you are obese  Certain medicines can change your baby's movements  Tell your healthcare provider about the medicines you are taking  Track fetal movements at home:  Fetal movement is most often felt when you lie quietly on your side  Your healthcare provider may ask you to count movements for 2 hours  He or she may ask you to track how long it takes for your baby to move 10 times  Keep a log of your baby's movements  Contact your doctor or obstetrician if:   It takes longer than usual to feel 8 of your unborn baby's movements  You do not feel your unborn baby move at least 10 times in 2 hours  The skin on your hands, feet, and around your eyes is more swollen than usual     You have a headache for at least 24 hours      Tiny red dots appear on your skin     Your belly is tender when you press on it  You have questions or concerns about your condition or care  Return to the emergency department if:   You do not feel your unborn baby move for 12 hours  You feel cramping or constant pain in your abdomen  You have heavy bleeding from your vagina  You have a severe headache and cannot see clearly  You are having trouble breathing or are vomiting  You have a seizure  © Copyright [employer] 2022 Information is for End User's use only and may not be sold, redistributed or otherwise used for commercial purposes  All illustrations and images included in CareNotes® are the copyrighted property of A D A M [employer]  or Carla Buck  The above information is an  only  It is not intended as medical advice for individual conditions or treatments  Talk to your doctor, nurse or pharmacist before following any medical regimen to see if it is safe and effective for you  Early Labor Signs   WHAT YOU SHOULD KNOW:   Early labor signs are changes in your body that allow your baby to pass through your birth canal   INSTRUCTIONS:   Signs and symptoms of early labor:   Lightening  occurs when your baby drops inside your pelvis  You may feel increased pressure in your pelvis  This may happen a few weeks to a few hours before your labor begins  Contractions  are cramps and tightening that occur in your uterus to help move the baby through your birth canal  Contractions occur regularly and more often each time  Each one lasts about 30 to 70 seconds, and gets stronger and more painful until you deliver your baby  Contractions do not go away with movement  They start in your lower back and move to the front in your abdomen  Effacement  occurs when your cervix softens and thins, so it can easily open for the baby  Your primary healthcare provider [facility]) or obstetrician will examine your cervix for effacement       Dilation  is widening of your cervix, also for the baby's passage  Your PHP or obstetrician will examine your cervix for dilation  Your cervix will be fully opened and ready for delivery when it is dilated to 10 centimeters  Increased discharge  from your vagina may occur  It may be pink, clear, or slightly bloody  This discharge may also be called bloody show  Bloody show is a mucus plug that forms and blocks your cervix during pregnancy  Rupture of membranes  is a sudden release of clear fluid from your vagina  It is also known as when your water breaks  Your PHP or obstetrician may need to break your water if it does not break on its own  False labor: You may have false labor signs, which are also called Juniata Neely contractions  False labor is common and may happen several weeks or days before your actual labor  The contractions are not regular, and do not get closer together  The pain is usually mild, does not worsen, and is felt only in front  Juniata Neely contractions may happen later in the day, and stop after you change position, walk, or rest   Contact your PHP or obstetrician if:   You have pain in your lower back or abdomen  You have bloody mucus or show  You have questions or concerns about your condition or care  Return to the emergency department if:   You have regular, painful contractions that are less than 5 minutes apart, and last 30 to 70 seconds each  You have heavy vaginal bleeding  You have a constant trickle or sudden gush of clear fluid from your vagina  You notice a sudden decrease in your baby's movement  © 2014 [address] [employer] is for End User's use only and may not be sold, redistributed or otherwise used for commercial purposes  All illustrations and images included in CareNotes® are the copyrighted property of [email or web address] , [employer]  or Jesse Turner  The above information is an  only   It is not intended as medical advice for individual conditions or treatments  Talk to your doctor, nurse or pharmacist before following any medical regimen to see if it is safe and effective for you

## 2022-08-12 NOTE — PROGRESS NOTES
Routine Prenatal Visit  [facility] [address] [place] [address]    Assessment/Plan:  Freddy Marin is a 32y o  year old [id number] at 38w3d who presents for routine prenatal visit  1  38 weeks gestation of pregnancy  -     Strep B DNA probe, amplification    2  Fetal macrosomia during pregnancy in third trimester, single or unspecified fetus    3  Polyhydramnios in third trimester complication, single or unspecified fetus    4  Non-compliant pregnant patient, third trimester    5  Encounter for supervision of other normal pregnancy in third trimester        Subjective:     CC: Prenatal care    Thania Santiago is a 32 y o  [id number] female who presents for routine prenatal care at 38w4d  Pregnancy ROS: no leakage of fluid, pelvic pain, or vaginal bleeding   good fetal movement  Scheduled for RLTCS 22  The following portions of the patient's history were reviewed and updated as appropriate: allergies, current medications, past family history, past medical history, obstetric history, gynecologic history, past social history, past surgical history and problem list       Objective:  /80   Wt 92 1 kg (203 lb)   LMP 11/15/2021   BMI 37 13 kg/m²   Pregravid Weight/BMI: Pregravid weight not on file (BMI Could not be calculated)  Current Weight: 92 1 kg (203 lb)   Total Weight Gain: Not found  Pre-Mervat Vitals    Flowsheet Row Most Recent Value   Prenatal Assessment    Fetal Heart Rate 140   Movement Present   Prenatal Vitals    Blood Pressure 127/80   Weight - Scale 92 1 kg (203 lb)   Urine Albumin/Glucose    Dilation/Effacement/Station    Vaginal Drainage    Edema    LLE Edema Trace   RLE Edema Trace   Facial Edema None           General: Well appearing, no distress  Respiratory: Unlabored breathing  Cardiovascular: Regular rate  Abdomen: Soft, gravid, nontender  Fundal Height: Appropriate for gestational age  Extremities: Warm and well perfused  Non tender

## 2022-08-14 LAB — GP B STREP DNA SPEC QL NAA+PROBE: POSITIVE

## 2022-08-15 ENCOUNTER — ANESTHESIA EVENT (INPATIENT)
Dept: LABOR AND DELIVERY | Facility: HOSPITAL | Age: 27
DRG: 540 | End: 2022-08-15
Payer: COMMERCIAL

## 2022-08-15 PROCEDURE — NC001 PR NO CHARGE: Performed by: OBSTETRICS & GYNECOLOGY

## 2022-08-16 ENCOUNTER — HOSPITAL ENCOUNTER (INPATIENT)
Facility: HOSPITAL | Age: 27
LOS: 2 days | Discharge: HOME | DRG: 540 | End: 2022-08-18
Attending: OBSTETRICS & GYNECOLOGY | Admitting: OBSTETRICS & GYNECOLOGY
Payer: COMMERCIAL

## 2022-08-16 ENCOUNTER — ANESTHESIA (INPATIENT)
Dept: LABOR AND DELIVERY | Facility: HOSPITAL | Age: 27
DRG: 540 | End: 2022-08-16
Payer: COMMERCIAL

## 2022-08-16 DIAGNOSIS — Z98.891 HISTORY OF 3 CESAREAN SECTIONS: ICD-10-CM

## 2022-08-16 DIAGNOSIS — F12.91 HISTORY OF MARIJUANA USE: ICD-10-CM

## 2022-08-16 DIAGNOSIS — O36.63X0 FETAL MACROSOMIA DURING PREGNANCY IN THIRD TRIMESTER, SINGLE OR UNSPECIFIED FETUS: ICD-10-CM

## 2022-08-16 DIAGNOSIS — Z98.891 STATUS POST REPEAT LOW TRANSVERSE CESAREAN SECTION: Primary | ICD-10-CM

## 2022-08-16 DIAGNOSIS — Z3A.39 39 WEEKS GESTATION OF PREGNANCY: ICD-10-CM

## 2022-08-16 PROBLEM — Z87.898 HISTORY OF MARIJUANA USE: Status: ACTIVE | Noted: 2022-08-16

## 2022-08-16 LAB
ABO GROUP BLD: NORMAL
AMPHETAMINES SERPL QL SCN: NEGATIVE
BARBITURATES UR QL: NEGATIVE
BASE EXCESS BLDCOA CALC-SCNC: -8.9 MMOL/L (ref 3–11)
BASE EXCESS BLDCOV CALC-SCNC: -4.8 MMOL/L (ref 1–9)
BASOPHILS # BLD AUTO: 0.02 THOUSANDS/ΜL (ref 0–0.1)
BASOPHILS NFR BLD AUTO: 0 % (ref 0–1)
BENZODIAZ UR QL: NEGATIVE
BILIRUB UR QL STRIP: NEGATIVE
BLD GP AB SCN SERPL QL: NEGATIVE
CLARITY UR: CLEAR
COCAINE UR QL: NEGATIVE
COLOR UR: YELLOW
EOSINOPHIL # BLD AUTO: 0.13 THOUSAND/ΜL (ref 0–0.61)
EOSINOPHIL NFR BLD AUTO: 2 % (ref 0–6)
ERYTHROCYTE [DISTWIDTH] IN BLOOD BY AUTOMATED COUNT: 17 % (ref 11.6–15.1)
GLUCOSE UR STRIP-MCNC: NEGATIVE MG/DL
HBV SURFACE AG SER QL: NORMAL
HCO3 BLDCOA-SCNC: 17 MMOL/L (ref 17.3–27.3)
HCO3 BLDCOV-SCNC: 21.5 MMOL/L (ref 12.2–28.6)
HCT VFR BLD AUTO: 30.1 % (ref 34.8–46.1)
HGB BLD-MCNC: 9.1 G/DL (ref 11.5–15.4)
HGB UR QL STRIP.AUTO: NEGATIVE
HIV 1+2 AB+HIV1 P24 AG SERPL QL IA: NORMAL
HIV1 P24 AG SER QL: NORMAL
IMM GRANULOCYTES # BLD AUTO: 0.05 THOUSAND/UL (ref 0–0.2)
IMM GRANULOCYTES NFR BLD AUTO: 1 % (ref 0–2)
KETONES UR STRIP-MCNC: NEGATIVE MG/DL
LEUKOCYTE ESTERASE UR QL STRIP: NEGATIVE
LYMPHOCYTES # BLD AUTO: 1.84 THOUSANDS/ΜL (ref 0.6–4.47)
LYMPHOCYTES NFR BLD AUTO: 22 % (ref 14–44)
MCH RBC QN AUTO: 21.4 PG (ref 26.8–34.3)
MCHC RBC AUTO-ENTMCNC: 30.2 G/DL (ref 31.4–37.4)
MCV RBC AUTO: 71 FL (ref 82–98)
METHADONE UR QL: NEGATIVE
MONOCYTES # BLD AUTO: 0.73 THOUSAND/ΜL (ref 0.17–1.22)
MONOCYTES NFR BLD AUTO: 9 % (ref 4–12)
NEUTROPHILS # BLD AUTO: 5.66 THOUSANDS/ΜL (ref 1.85–7.62)
NEUTS SEG NFR BLD AUTO: 66 % (ref 43–75)
NITRITE UR QL STRIP: NEGATIVE
NRBC BLD AUTO-RTO: 0 /100 WBCS
O2 CT VFR BLDCOA CALC: 4.4 ML/DL
OPIATES UR QL SCN: NEGATIVE
OXYCODONE+OXYMORPHONE UR QL SCN: NEGATIVE
OXYHGB MFR BLDCOA: 39.9 %
OXYHGB MFR BLDCOV: 26.9 %
PCO2 BLDCOA: 36.9 MM[HG] (ref 30–60)
PCO2 BLDCOV: 44 MM HG (ref 27–43)
PCP UR QL: NEGATIVE
PH BLDCOA: 7.28 [PH] (ref 7.23–7.43)
PH BLDCOV: 7.31 [PH] (ref 7.19–7.49)
PH UR STRIP.AUTO: 7 [PH]
PLATELET # BLD AUTO: 253 THOUSANDS/UL (ref 149–390)
PMV BLD AUTO: 11.4 FL (ref 8.9–12.7)
PO2 BLDCOA: 18.3 MM HG (ref 5–25)
PO2 BLDCOV: 14.2 MM HG (ref 15–45)
PROT UR STRIP-MCNC: NEGATIVE MG/DL
RBC # BLD AUTO: 4.25 MILLION/UL (ref 3.81–5.12)
RH BLD: POSITIVE
RPR SER QL: NORMAL
RUBV IGG SERPL IA-ACNC: 37.9 IU/ML
SAO2 % BLDCOV: 6.8 ML/DL
SP GR UR STRIP.AUTO: 1.02 (ref 1–1.03)
SPECIMEN EXPIRATION DATE: NORMAL
THC UR QL: POSITIVE
UROBILINOGEN UR QL STRIP.AUTO: 0.2 E.U./DL
WBC # BLD AUTO: 8.43 THOUSAND/UL (ref 4.31–10.16)

## 2022-08-16 PROCEDURE — 59514 CESAREAN DELIVERY ONLY: CPT | Performed by: OBSTETRICS & GYNECOLOGY

## 2022-08-16 PROCEDURE — 87147 CULTURE TYPE IMMUNOLOGIC: CPT

## 2022-08-16 PROCEDURE — 82805 BLOOD GASES W/O2 SATURATION: CPT | Performed by: OBSTETRICS & GYNECOLOGY

## 2022-08-16 PROCEDURE — 4A1HXCZ MONITORING OF PRODUCTS OF CONCEPTION, CARDIAC RATE, EXTERNAL APPROACH: ICD-10-PCS | Performed by: OBSTETRICS & GYNECOLOGY

## 2022-08-16 PROCEDURE — 87806 HIV AG W/HIV1&2 ANTB W/OPTIC: CPT

## 2022-08-16 PROCEDURE — 87086 URINE CULTURE/COLONY COUNT: CPT

## 2022-08-16 PROCEDURE — 94762 N-INVAS EAR/PLS OXIMTRY CONT: CPT

## 2022-08-16 PROCEDURE — 80081 OBSTETRIC PANEL INC HIV TSTG: CPT

## 2022-08-16 PROCEDURE — 80307 DRUG TEST PRSMV CHEM ANLYZR: CPT

## 2022-08-16 PROCEDURE — 86923 COMPATIBILITY TEST ELECTRIC: CPT

## 2022-08-16 PROCEDURE — 81003 URINALYSIS AUTO W/O SCOPE: CPT

## 2022-08-16 RX ORDER — CEFAZOLIN SODIUM 2 G/50ML
2000 SOLUTION INTRAVENOUS ONCE
Status: COMPLETED | OUTPATIENT
Start: 2022-08-16 | End: 2022-08-16

## 2022-08-16 RX ORDER — NALOXONE HYDROCHLORIDE 0.4 MG/ML
0.1 INJECTION, SOLUTION INTRAMUSCULAR; INTRAVENOUS; SUBCUTANEOUS
Status: ACTIVE | OUTPATIENT
Start: 2022-08-16 | End: 2022-08-17

## 2022-08-16 RX ORDER — DIPHENHYDRAMINE HCL 25 MG
25 TABLET ORAL EVERY 6 HOURS PRN
Status: DISCONTINUED | OUTPATIENT
Start: 2022-08-17 | End: 2022-08-18 | Stop reason: HOSPADM

## 2022-08-16 RX ORDER — ONDANSETRON 2 MG/ML
4 INJECTION INTRAMUSCULAR; INTRAVENOUS EVERY 4 HOURS PRN
Status: DISPENSED | OUTPATIENT
Start: 2022-08-16 | End: 2022-08-17

## 2022-08-16 RX ORDER — OXYTOCIN/RINGER'S LACTATE 30/500 ML
PLASTIC BAG, INJECTION (ML) INTRAVENOUS CONTINUOUS PRN
Status: DISCONTINUED | OUTPATIENT
Start: 2022-08-16 | End: 2022-08-16

## 2022-08-16 RX ORDER — DOCUSATE SODIUM 100 MG/1
100 CAPSULE, LIQUID FILLED ORAL 2 TIMES DAILY
Status: DISCONTINUED | OUTPATIENT
Start: 2022-08-16 | End: 2022-08-18 | Stop reason: HOSPADM

## 2022-08-16 RX ORDER — ONDANSETRON 2 MG/ML
INJECTION INTRAMUSCULAR; INTRAVENOUS AS NEEDED
Status: DISCONTINUED | OUTPATIENT
Start: 2022-08-16 | End: 2022-08-16

## 2022-08-16 RX ORDER — SIMETHICONE 80 MG
80 TABLET,CHEWABLE ORAL 4 TIMES DAILY PRN
Status: DISCONTINUED | OUTPATIENT
Start: 2022-08-16 | End: 2022-08-18 | Stop reason: HOSPADM

## 2022-08-16 RX ORDER — ENOXAPARIN SODIUM 100 MG/ML
40 INJECTION SUBCUTANEOUS DAILY
Status: DISCONTINUED | OUTPATIENT
Start: 2022-08-17 | End: 2022-08-17 | Stop reason: CLARIF

## 2022-08-16 RX ORDER — OXYTOCIN/RINGER'S LACTATE 30/500 ML
PLASTIC BAG, INJECTION (ML) INTRAVENOUS
Status: COMPLETED
Start: 2022-08-16 | End: 2022-08-16

## 2022-08-16 RX ORDER — CALCIUM CARBONATE 200(500)MG
1000 TABLET,CHEWABLE ORAL DAILY PRN
Status: DISCONTINUED | OUTPATIENT
Start: 2022-08-16 | End: 2022-08-18 | Stop reason: HOSPADM

## 2022-08-16 RX ORDER — SODIUM CHLORIDE, SODIUM LACTATE, POTASSIUM CHLORIDE, CALCIUM CHLORIDE 600; 310; 30; 20 MG/100ML; MG/100ML; MG/100ML; MG/100ML
125 INJECTION, SOLUTION INTRAVENOUS CONTINUOUS
Status: DISCONTINUED | OUTPATIENT
Start: 2022-08-16 | End: 2022-08-18 | Stop reason: HOSPADM

## 2022-08-16 RX ORDER — ACETAMINOPHEN 325 MG/1
650 TABLET ORAL EVERY 6 HOURS SCHEDULED
Status: DISCONTINUED | OUTPATIENT
Start: 2022-08-16 | End: 2022-08-18 | Stop reason: HOSPADM

## 2022-08-16 RX ORDER — KETOROLAC TROMETHAMINE 30 MG/ML
30 INJECTION, SOLUTION INTRAMUSCULAR; INTRAVENOUS EVERY 6 HOURS
Status: COMPLETED | OUTPATIENT
Start: 2022-08-16 | End: 2022-08-17

## 2022-08-16 RX ORDER — IBUPROFEN 600 MG/1
600 TABLET ORAL EVERY 6 HOURS
Status: DISCONTINUED | OUTPATIENT
Start: 2022-08-19 | End: 2022-08-18 | Stop reason: HOSPADM

## 2022-08-16 RX ORDER — DEXAMETHASONE SODIUM PHOSPHATE 4 MG/ML
8 INJECTION, SOLUTION INTRA-ARTICULAR; INTRALESIONAL; INTRAMUSCULAR; INTRAVENOUS; SOFT TISSUE ONCE AS NEEDED
Status: ACTIVE | OUTPATIENT
Start: 2022-08-16 | End: 2022-08-17

## 2022-08-16 RX ORDER — OXYCODONE HYDROCHLORIDE 5 MG/1
5 TABLET ORAL EVERY 4 HOURS PRN
Status: DISCONTINUED | OUTPATIENT
Start: 2022-08-17 | End: 2022-08-18 | Stop reason: HOSPADM

## 2022-08-16 RX ORDER — DIPHENHYDRAMINE HYDROCHLORIDE 50 MG/ML
25 INJECTION INTRAMUSCULAR; INTRAVENOUS EVERY 6 HOURS PRN
Status: ACTIVE | OUTPATIENT
Start: 2022-08-16 | End: 2022-08-17

## 2022-08-16 RX ORDER — OXYCODONE HYDROCHLORIDE AND ACETAMINOPHEN 5; 325 MG/1; MG/1
1 TABLET ORAL EVERY 6 HOURS PRN
Status: DISPENSED | OUTPATIENT
Start: 2022-08-16 | End: 2022-08-17

## 2022-08-16 RX ORDER — KETOROLAC TROMETHAMINE 30 MG/ML
30 INJECTION, SOLUTION INTRAMUSCULAR; INTRAVENOUS EVERY 6 HOURS SCHEDULED
Status: DISCONTINUED | OUTPATIENT
Start: 2022-08-17 | End: 2022-08-18 | Stop reason: HOSPADM

## 2022-08-16 RX ORDER — NALBUPHINE HCL 10 MG/ML
2 AMPUL (ML) INJECTION EVERY 4 HOURS PRN
Status: ACTIVE | OUTPATIENT
Start: 2022-08-16 | End: 2022-08-17

## 2022-08-16 RX ORDER — OXYCODONE HYDROCHLORIDE 5 MG/1
10 TABLET ORAL EVERY 4 HOURS PRN
Status: DISCONTINUED | OUTPATIENT
Start: 2022-08-17 | End: 2022-08-18 | Stop reason: HOSPADM

## 2022-08-16 RX ORDER — FENTANYL CITRATE 50 UG/ML
INJECTION, SOLUTION INTRAMUSCULAR; INTRAVENOUS AS NEEDED
Status: DISCONTINUED | OUTPATIENT
Start: 2022-08-16 | End: 2022-08-16

## 2022-08-16 RX ORDER — ONDANSETRON 2 MG/ML
4 INJECTION INTRAMUSCULAR; INTRAVENOUS EVERY 8 HOURS PRN
Status: DISCONTINUED | OUTPATIENT
Start: 2022-08-16 | End: 2022-08-18 | Stop reason: HOSPADM

## 2022-08-16 RX ORDER — MORPHINE SULFATE 0.5 MG/ML
INJECTION, SOLUTION EPIDURAL; INTRATHECAL; INTRAVENOUS
Status: DISPENSED
Start: 2022-08-16 | End: 2022-08-16

## 2022-08-16 RX ORDER — BUPIVACAINE HYDROCHLORIDE 7.5 MG/ML
INJECTION, SOLUTION INTRASPINAL AS NEEDED
Status: DISCONTINUED | OUTPATIENT
Start: 2022-08-16 | End: 2022-08-16

## 2022-08-16 RX ORDER — SODIUM CHLORIDE, SODIUM LACTATE, POTASSIUM CHLORIDE, CALCIUM CHLORIDE 600; 310; 30; 20 MG/100ML; MG/100ML; MG/100ML; MG/100ML
125 INJECTION, SOLUTION INTRAVENOUS CONTINUOUS
Status: DISCONTINUED | OUTPATIENT
Start: 2022-08-16 | End: 2022-08-16

## 2022-08-16 RX ORDER — MORPHINE SULFATE 1 MG/ML
INJECTION, SOLUTION EPIDURAL; INTRATHECAL; INTRAVENOUS AS NEEDED
Status: DISCONTINUED | OUTPATIENT
Start: 2022-08-16 | End: 2022-08-16

## 2022-08-16 RX ORDER — ONDANSETRON 2 MG/ML
4 INJECTION INTRAMUSCULAR; INTRAVENOUS EVERY 8 HOURS PRN
Status: DISCONTINUED | OUTPATIENT
Start: 2022-08-16 | End: 2022-08-16

## 2022-08-16 RX ORDER — OXYTOCIN/RINGER'S LACTATE 30/500 ML
62.5 PLASTIC BAG, INJECTION (ML) INTRAVENOUS ONCE
Status: COMPLETED | OUTPATIENT
Start: 2022-08-16 | End: 2022-08-16

## 2022-08-16 RX ORDER — METOCLOPRAMIDE HYDROCHLORIDE 5 MG/ML
5 INJECTION INTRAMUSCULAR; INTRAVENOUS EVERY 6 HOURS PRN
Status: ACTIVE | OUTPATIENT
Start: 2022-08-16 | End: 2022-08-17

## 2022-08-16 RX ORDER — FENTANYL CITRATE 50 UG/ML
INJECTION, SOLUTION INTRAMUSCULAR; INTRAVENOUS
Status: COMPLETED
Start: 2022-08-16 | End: 2022-08-16

## 2022-08-16 RX ADMIN — SODIUM CHLORIDE, SODIUM LACTATE, POTASSIUM CHLORIDE, AND CALCIUM CHLORIDE 125 ML/HR: .6; .31; .03; .02 INJECTION, SOLUTION INTRAVENOUS at 07:12

## 2022-08-16 RX ADMIN — BUPIVACAINE HYDROCHLORIDE IN DEXTROSE 1.4 ML: 7.5 INJECTION, SOLUTION SUBARACHNOID at 09:09

## 2022-08-16 RX ADMIN — ONDANSETRON 4 MG: 2 INJECTION INTRAMUSCULAR; INTRAVENOUS at 17:48

## 2022-08-16 RX ADMIN — CEFAZOLIN SODIUM 2000 MG: 2 SOLUTION INTRAVENOUS at 08:54

## 2022-08-16 RX ADMIN — ACETAMINOPHEN 650 MG: 325 TABLET, FILM COATED ORAL at 12:11

## 2022-08-16 RX ADMIN — MORPHINE SULFATE 0.15 MG: 1 INJECTION, SOLUTION EPIDURAL; INTRATHECAL; INTRAVENOUS at 09:09

## 2022-08-16 RX ADMIN — OXYCODONE AND ACETAMINOPHEN 1 TABLET: 5; 325 TABLET ORAL at 14:31

## 2022-08-16 RX ADMIN — OXYCODONE AND ACETAMINOPHEN 1 TABLET: 5; 325 TABLET ORAL at 21:41

## 2022-08-16 RX ADMIN — ONDANSETRON 4 MG: 2 INJECTION INTRAMUSCULAR; INTRAVENOUS at 09:39

## 2022-08-16 RX ADMIN — FENTANYL CITRATE 10 MCG: 50 INJECTION INTRAMUSCULAR; INTRAVENOUS at 09:09

## 2022-08-16 RX ADMIN — DOCUSATE SODIUM 100 MG: 100 CAPSULE, LIQUID FILLED ORAL at 17:36

## 2022-08-16 RX ADMIN — KETOROLAC TROMETHAMINE 30 MG: 30 INJECTION, SOLUTION INTRAMUSCULAR at 17:36

## 2022-08-16 RX ADMIN — SODIUM CHLORIDE, POTASSIUM CHLORIDE, SODIUM LACTATE AND CALCIUM CHLORIDE 125 ML/HR: 600; 310; 30; 20 INJECTION, SOLUTION INTRAVENOUS at 16:22

## 2022-08-16 RX ADMIN — Medication 250 MILLI-UNITS/MIN: at 09:36

## 2022-08-16 RX ADMIN — KETOROLAC TROMETHAMINE 30 MG: 30 INJECTION, SOLUTION INTRAMUSCULAR at 11:26

## 2022-08-16 RX ADMIN — Medication 62.5 MILLI-UNITS/MIN: at 10:53

## 2022-08-16 RX ADMIN — KETOROLAC TROMETHAMINE 30 MG: 30 INJECTION, SOLUTION INTRAMUSCULAR at 23:24

## 2022-08-16 NOTE — ANESTHESIA POSTPROCEDURE EVALUATION
Post-Op Assessment Note    CV Status:  Stable    Pain management: adequate     Mental Status:  Alert and awake   Hydration Status:  Euvolemic   PONV Controlled:  Controlled   Airway Patency:  Patent      Post Op Vitals Reviewed: Yes      Staff: Anesthesiologist         No complications documented      BP      Temp      Pulse     Resp      SpO2      /67 (BP Location: Left arm)   Pulse 67   Temp 98 °F (36 7 °C) (Oral)   Resp 16   Ht 5' 2" (1 575 m)   Wt 92 1 kg (203 lb)   LMP 11/15/2021   SpO2 99%   Breastfeeding No   BMI 37 13 kg/m²

## 2022-08-16 NOTE — ANESTHESIA PROCEDURE NOTES
Spinal Block    Patient location during procedure: OB  Start time: 8/16/2022 9:09 AM  Staffing  Anesthesiologist: Gabriela Carrizales DO  Preanesthetic Checklist  Completed: patient identified, IV checked, site marked, risks and benefits discussed, surgical consent, monitors and equipment checked, pre-op evaluation and timeout performed  Spinal Block  Prep: Betadine  Patient monitoring: heart rate, continuous pulse ox and frequent blood pressure checks  Approach: midline  Location: L4-5  Injection technique: single-shot  Needle  Needle type: pencil-tip   Needle gauge: 25 G  Needle length: 10 cm  Assessment  Sensory level: T4  Post-procedure:  site cleaned  Additional Notes  4th timw redo c section epi wash used

## 2022-08-16 NOTE — ANESTHESIA PREPROCEDURE EVALUATION
Procedure:   SECTION () REPEAT (N/A Uterus)    Relevant Problems   GYN   (+) 39 weeks gestation of pregnancy      HEMATOLOGY   (+) Anemia      NEURO/PSYCH   (+) History of gestational hypertension        Physical Exam    Airway    Mallampati score: II  TM Distance: >3 FB  Neck ROM: full     Dental   No notable dental hx     Cardiovascular  Rhythm: regular, Rate: normal, Cardiovascular exam normal    Pulmonary  Pulmonary exam normal Breath sounds clear to auscultation,     Other Findings        Anesthesia Plan  ASA Score- 2     Anesthesia Type- spinal with ASA Monitors  Additional Monitors:   Airway Plan:           Plan Factors-Exercise tolerance (METS): >4 METS  Chart reviewed  Existing labs reviewed  Patient summary reviewed  Patient is not a current smoker  Induction-     Postoperative Plan-     Informed Consent- Anesthetic plan and risks discussed with patient and spouse

## 2022-08-16 NOTE — OP NOTE
Section Procedure Note    Indications:   Maternal request for repeat  section    Pre-operative Diagnosis:   Patient Active Problem List   Diagnosis    History of transfusion of packed RBC        Anemia    History of 3  sections    History of gestational hypertension    Polyhydramnios in third trimester    Fetal macrosomia in pregnancy in third trimester    Non-compliant pregnant patient, third trimester    39 weeks gestation of pregnancy     Post-operative Diagnosis:   RLTCS  Patient Active Problem List   Diagnosis    History of transfusion of packed RBC        Anemia    History of 3  sections    History of gestational hypertension    Polyhydramnios in third trimester    Fetal macrosomia in pregnancy in third trimester    Non-compliant pregnant patient, third trimester    39 weeks gestation of pregnancy       Attending: Alvin Rodgers MD  Resident: Ashlee Gandara MD    Maternal Findings:  Normal uterus with minimal bladder adhesions to the anterior aspect of the uterus  Normal tubes and ovaries bilaterally     Findings:  Viable male weighing 8lbs 10 3oz;  Apgar scores of 8 at one minute and 9 at five minutes     Clear amniotic fluid  Normal placenta with 3-vessel cord inserted centrally    Arterial and Venous Gases:  Umbilical Cord Venous Blood Gas:  Results from last 7 days   Lab Units 22  0747   PH COV  7 307   PCO2 COV mm HG 44 0*   HCO3 COV mmol/L 21 5   BASE EXC COV mmol/L -4 8*   O2 CT CD VB mL/dL 6 8   O2 HGB, VENOUS CORD % 45 5     Umbilical Cord Arterial Blood Gas:  Results from last 7 days   Lab Units 22  0747   PH COA  7 282   PCO2 COA  36 9   PO2 COA mm HG 18 3   HCO3 COA mmol/L 17 0*   BASE EXC COA mmol/L -8 9*   O2 CONTENT CORD ART ml/dl 4 4   O2 HGB, ARTERIAL CORD % 39 9       Specimens: Arterial and venous cord gases, cord blood, segment of umbilical cord, placenta to storage    Quantitative Blood Loss: 304 mL    Drains: Alvarenga catheter           Complications:  None; patient tolerated the procedure well  Disposition: PACU            Condition: stable    Brief Labor Course:   Patient was admitted for scheduled RLTCS  Procedure Details   The patient was seen prior to the procedure  Risks, benefits, possible complications, alternate treatment options, and expected outcomes were discussed with the patient  The patient agreed with the proposed plan and gave informed consent for a scheduled RLTCS  The patient was taken to the [facility] Operating Room where she received spinal anesthesia  For infection prophylaxis, she received 2g Ancef preoperatively  Fetal heart tones in the OR were assessed and noted to be within normal limits (baseline 130s) and a Alvarenga catheter and SCDs were placed  The abdomen was prepped with Chloraprep, the vagina was prepped with Betadine, and following appropriate drying time, the patient was draped in the usual sterile manner  A Time Out was held and the above information confirmed  The patient was identified as Emerson Roca and the procedure verified as a  Delivery for RLTCS  A Pfannenstiel incision was made and carried down through the underlying subcutaneous tissue to the fascia using a scalpel  The rectus fascia was then nicked in the midline and dissected laterally using Oliveira scissors  The superior edge of the  fascial incision was grasped with Kocher clamps bilaterally, tented upward and the underlying rectus muscles were dissected off sharply with Oliveira scissors  This was repeated on the inferior edge of the fascia and dissected down to the pubic rami  The rectus muscles were  and the peritoneum was identified, entered, and extended longitudinally with blunt dissection  The bladder blade was inserted   The vesicouterine peritoneum and bladder adhesions were identified, entered sharply, and dissected laterally with  Metzenbaum scissors to form a bladder flap  The bladder blade was repositioned  A low transverse uterine incision was made with the scalpel and extended laterally with blunt dissection  The amnion was entered bluntly  The fetal head was palpated, elevated, and delivered through the uterine incision followed by the body without difficulty  There was noted to be spontaneous cry and good tone  There was no apparent injury to the   The umbilical cord was doubly clamped and cut after 30 seconds to allow for delayed cord clamping  The infant was handed off to the  providers  Arterial and venous cord gases, cord blood, and a segment of umbilical cord were obtained for evaluation  The placenta delivered spontaneously with uterine fundal massage and appeared normal  The uterus was exteriorized and cleaned out with a moist lap sponge  The uterine incision was closed with a running locked suture of 0 Vicryl  A second layer of the same suture was used to imbricate the first   Hemostasis was noted to be excellent  Moist lap sponge was used to remove clot and debris from the posterior culdesac  The uterus was returned to the abdomen  The paracolic gutters were inspected and cleared of all clots and debris with moist lap sponges  Rectus muscle was closed with two mattress stitches of chromic suture  The fascia was closed with a running suture of 0 Vicryl  Subcutaneous adipose tissue was closed with a running suture of 0 Vicryl  The skin was closed with a subcuticular running suture of 4-0 Monocryl  Benzoin and steri-strip dressings were applied  The patient appeared to tolerate the procedure very well  Lap sponge, needle, and instrument counts were correct x2  Uterus was expressed for minimal blood  The patient was transferred to her postpartum recovery room in stable condition and her infant went to the  nursery  Attending Attestation: Dr Lee Ann Cruz MD was present for the entire procedure      Adrienne WALSH  Suleman Renteria MD  OB/GYN PGY-2  8/16/2022  10:20 AM

## 2022-08-16 NOTE — ASSESSMENT & PLAN NOTE
Hemoglobin   Date Value Ref Range Status   08/16/2022 9 1 (L) 11 5 - 15 4 g/dL Final   06/01/2018 7 1 (L) 12 0 - 16 0 G/DL Final     Preop Hgb: 9 1  POD#1 AM CBC 7 7, Venofer given   POD#2 AM CBC 7 8, pt asymptomatic this morning

## 2022-08-16 NOTE — ASSESSMENT & PLAN NOTE
Admit to L&D  CBC, RPR, Type & Screen  SVE: deferred  FHT: reactive, with one 3-4 min deceleration  Clinical EFW: 9lbs by Leopold's ; Vertex confirmed by TAUS  GBS status: positive   Postpartum contraception plan: POPs  Spinal anesthesia  To OR for RLTCS

## 2022-08-16 NOTE — PLAN OF CARE

## 2022-08-16 NOTE — H&P
H&P Exam - Obstetrics   Thania Painter 32 y o  female MRN: [id number]  Unit/Bed#:  Encounter: [id number]      ASSESSMENT:  32 y  o yo [id number] at 39w0d weeks gestation who is being admitted for scheduled RLTCS   EFW: 9lbs  VTX by transabdominal ultrasound    PLAN:    39 weeks gestation of pregnancy  Assessment & Plan  Admit to L&D  CBC, RPR, Type & Screen  SVE: deferred  FHT: reactive, with one 3-4 min deceleration  Clinical EFW: 9lbs by Leopold's ; Vertex confirmed by TAUS  GBS status: positive   Postpartum contraception plan: POPs  Spinal anesthesia  To OR for RLTCS    Fetal macrosomia in pregnancy in third trimester  Assessment & Plan  EFW >97%ile at 29w3d  EFW by Leopold's 9lbs    Polyhydramnios in third trimester  Assessment & Plan  MONTEZ: 25 6cm at 29w3d    Anemia  Assessment & Plan  Hemoglobin   Date Value Ref Range Status   2022 9 1 (L) 11 5 - 15 4 g/dL Final   2018 7 1 (L) 12 0 - 16 0 G/DL Final     T&C for 2u and 2nd IV      Discussed with Dr Zamora Gist      This patient will be an INPATIENT  and I certify the anticipated length of stay is >2 Midnights  History of Present Illness     Chief Complaint: Elective  Section, Repeat    HPI:  Yehuda Garcia is a 32 y o  [id number] female with an IDA of 2022, by Last Menstrual Period at 39w0d weeks gestation who is being admitted for RLTCS  Her pregnancy is complicated by polyhydramnios, suspected fetal macrosomia, 3 prior  sections, and maternal anemia  Contractions: no  Loss of fluid: no  Vaginal bleeding: no  Fetal movement: yes    She is an [facility] patient       PREGNANCY COMPLICATIONS:   1) Pregnancy at 39w1d  2) Fetal macrosomia  3) Polyhydramnios  4) Noncompliant pregnant patient  5) Maternal anemia     OB History    Para Term  AB Living   4 3 3     3   SAB IAB Ectopic Multiple Live Births         0 3      # Outcome Date GA Lbr Wai/2nd Weight Sex Delivery Anes PTL Lv   4 Current            3 Term 18 39w1d  3204 g (7 lb 1 oz) F CS-LTranv Spinal N GENEVA   2 Term 01/27/15 39w0d  4366 g (9 lb 10 oz) M CS-LTranv Spinal N GENEVA      Complications: Other Excessive Bleeding, Gestational hypertension   1 Term 14 40w0d   M CS-LTranv Spinal N GENEVA      Complications: Fetal Intolerance, Other Excessive Bleeding, Gestational hypertension       Baby complications/comments: EFW >97%ile at 29w3d    Review of Systems   Constitutional: Negative for chills and fever  HENT: Negative for ear pain and sore throat  Eyes: Negative for pain and visual disturbance  Respiratory: Negative for cough and shortness of breath  Cardiovascular: Negative for chest pain and palpitations  Gastrointestinal: Negative for abdominal pain and vomiting  Genitourinary: Negative for dysuria and hematuria  Musculoskeletal: Negative for arthralgias and back pain  Skin: Negative for color change and rash  Neurological: Negative for seizures and syncope  All other systems reviewed and are negative  Historical Information   Past Medical History:   Diagnosis Date    Asthma      Past Surgical History:   Procedure Laterality Date     SECTION      ND  DELIVERY ONLY N/A 2018    Procedure:  SECTION () REPEAT;  Surgeon: Jerzy Grullon DO;  Location: [facility];  Service: Obstetrics     Social History   Social History     Substance and Sexual Activity   Alcohol Use No     Social History     Substance and Sexual Activity   Drug Use Yes    Types: Marijuana    Comment: used daily x 5 years  last used 2018     Social History     Tobacco Use   Smoking Status Never Smoker   Smokeless Tobacco Never Used     Family History: non-contributory    Meds/Allergies      No medications prior to admission  Allergies   Allergen Reactions    Iron Dizziness       OBJECTIVE:    Vitals: Blood pressure 131/72, pulse 81, last menstrual period 11/15/2021, SpO2 99 %, not currently breastfeeding  There is no height or weight on file to calculate BMI  Physical Exam  Vitals reviewed  Constitutional:       Appearance: Normal appearance  HENT:      Head: Normocephalic and atraumatic  Eyes:      Extraocular Movements: Extraocular movements intact  Cardiovascular:      Rate and Rhythm: Normal rate  Pulses: Normal pulses  Pulmonary:      Effort: Pulmonary effort is normal       Breath sounds: Normal breath sounds  Abdominal:      Palpations: Abdomen is soft  Tenderness: There is no abdominal tenderness  Comments: Gravid uterus   Musculoskeletal:         General: Normal range of motion  Cervical back: Normal range of motion  Skin:     General: Skin is warm and dry  Neurological:      Mental Status: She is alert  Psychiatric:         Mood and Affect: Mood normal          Behavior: Behavior normal          Cervix:  deferred    Fetal heart rate:   Approximate 3-4 min decel to the 80s, resolved with L lateral tilt  Now reactive  Irmo:   No contractions    GBS: positive    Prenatal Labs: I have personally reviewed pertinent reports    , Blood Type:   Lab Results   Component Value Date/Time    ABO Grouping A 06/11/2018 12:06 AM    ABO Grouping A 01/27/2015 07:00 AM     , D (Rh type):   Lab Results   Component Value Date/Time    Rh Factor Positive 06/11/2018 12:06 AM    Rh Factor Positive 01/27/2015 07:00 AM     , Antibody Screen:   Lab Results   Component Value Date/Time    Antibody Screen Negative 01/27/2015 07:00 AM    , HCT/HGB:   Lab Results   Component Value Date/Time    Hematocrit 30 1 (L) 08/16/2022 06:24 AM    Hematocrit 23 4 (L) 06/01/2018 12:25 PM    Hemoglobin 9 1 (L) 08/16/2022 06:24 AM    Hemoglobin 7 1 (L) 06/01/2018 12:25 PM      , MCV:   Lab Results   Component Value Date/Time    MCV 71 (L) 08/16/2022 06:24 AM    MCV 61 (L) 06/01/2018 12:25 PM      , Platelets:   Lab Results   Component Value Date/Time    Platelets 367 14/30/1360 06:24 AM    Platelets 287 15/68/9921 12:25 PM      , 1 hour Glucola:   Lab Results   Component Value Date/Time    GLUCOSE 1 HR 50 GM GLUC CHALLENGE-PREG  05/22/2018 01:43 PM    Glucose 84 06/29/2022 01:16 PM   , 3 hour GTT: No results found for: XXFEMYP2SX, Varicella: No results found for: VARICELLAIGG    , Rubella: No results found for: RUBELLAIGGQT     , VDRL/RPR:   Lab Results   Component Value Date/Time    RPR SCREEN See Note 05/22/2018 01:43 PM    RPR Non-Reactive 11/03/2021 02:28 PM      , Urine Culture/Screen:   Lab Results   Component Value Date/Time    Urine Culture >100,000 cfu/ml Staphylococcus saprophyticus (A) 11/03/2021 01:44 PM       , Urine Drug Screen:   Lab Results   Component Value Date/Time    BARBITURATE URINE Negative 01/27/2015 07:00 AM    Barbiturate Ur Negative 06/10/2018 11:58 PM    BENZODIAZEPINE URINE Negative 01/27/2015 07:00 AM    Benzodiazepine Urine Negative 06/10/2018 11:58 PM    THC URINE Negative 01/27/2015 07:00 AM    THC Urine Negative 06/10/2018 11:58 PM    COCAINE URINE Negative 01/27/2015 07:00 AM    Cocaine Urine Negative 06/10/2018 11:58 PM    METHADONE URINE Negative 01/27/2015 07:00 AM    Methadone Urine Negative 06/10/2018 11:58 PM    OPIATE URINE Negative 01/27/2015 07:00 AM    Opiate Urine Negative 06/10/2018 11:58 PM    PHENCYCLIDINE URINE Negative 01/27/2015 07:00 AM    PCP Ur Negative 06/10/2018 88:91 PM    TRICYCLICS URINE Negative 05/89/5640 07:00 AM   , Hep B:   Lab Results   Component Value Date/Time    Hepatitis B Surface Ag Non-reactive 11/03/2021 02:28 PM     , Hep C: No components found for: HEPCSAG, EXTHEPCSAG   , HIV:   Lab Results   Component Value Date/Time    HIV-1/HIV-2 Ab Non-Reactive 11/03/2021 02:28 PM     , Chlamydia: No results found for: EXTCHLAMYDIA      Invasive Devices  Timeline    None                     Jacobo Doherty MD  OBGYN PGY-2  8/15/2022  9:46 PM

## 2022-08-17 PROBLEM — Z98.891 STATUS POST REPEAT LOW TRANSVERSE CESAREAN SECTION: Status: ACTIVE | Noted: 2022-08-17

## 2022-08-17 LAB
BACTERIA UR CULT: ABNORMAL
ERYTHROCYTE [DISTWIDTH] IN BLOOD BY AUTOMATED COUNT: 16.6 % (ref 11.6–15.1)
HCT VFR BLD AUTO: 26.3 % (ref 34.8–46.1)
HGB BLD-MCNC: 7.7 G/DL (ref 11.5–15.4)
HIV 1+2 AB+HIV1 P24 AG SERPL QL IA: NORMAL
MCH RBC QN AUTO: 20.9 PG (ref 26.8–34.3)
MCHC RBC AUTO-ENTMCNC: 29.3 G/DL (ref 31.4–37.4)
MCV RBC AUTO: 72 FL (ref 82–98)
PLATELET # BLD AUTO: 191 THOUSANDS/UL (ref 149–390)
PMV BLD AUTO: 11.3 FL (ref 8.9–12.7)
RBC # BLD AUTO: 3.68 MILLION/UL (ref 3.81–5.12)
WBC # BLD AUTO: 9.2 THOUSAND/UL (ref 4.31–10.16)

## 2022-08-17 PROCEDURE — 85027 COMPLETE CBC AUTOMATED: CPT

## 2022-08-17 PROCEDURE — 99024 POSTOP FOLLOW-UP VISIT: CPT | Performed by: OBSTETRICS & GYNECOLOGY

## 2022-08-17 PROCEDURE — 94762 N-INVAS EAR/PLS OXIMTRY CONT: CPT

## 2022-08-17 RX ORDER — ENOXAPARIN SODIUM 100 MG/ML
40 INJECTION SUBCUTANEOUS DAILY
Status: DISCONTINUED | OUTPATIENT
Start: 2022-08-17 | End: 2022-08-18 | Stop reason: HOSPADM

## 2022-08-17 RX ADMIN — ACETAMINOPHEN 650 MG: 325 TABLET, FILM COATED ORAL at 11:11

## 2022-08-17 RX ADMIN — SIMETHICONE 80 MG: 80 TABLET, CHEWABLE ORAL at 15:59

## 2022-08-17 RX ADMIN — SIMETHICONE 80 MG: 80 TABLET, CHEWABLE ORAL at 11:17

## 2022-08-17 RX ADMIN — IRON SUCROSE 200 MG: 20 INJECTION, SOLUTION INTRAVENOUS at 13:18

## 2022-08-17 RX ADMIN — SIMETHICONE 80 MG: 80 TABLET, CHEWABLE ORAL at 23:18

## 2022-08-17 RX ADMIN — KETOROLAC TROMETHAMINE 30 MG: 30 INJECTION, SOLUTION INTRAMUSCULAR at 05:08

## 2022-08-17 RX ADMIN — KETOROLAC TROMETHAMINE 30 MG: 30 INJECTION, SOLUTION INTRAMUSCULAR; INTRAVENOUS at 12:09

## 2022-08-17 RX ADMIN — OXYCODONE 10 MG: 5 TABLET ORAL at 22:08

## 2022-08-17 RX ADMIN — ACETAMINOPHEN 650 MG: 325 TABLET, FILM COATED ORAL at 17:53

## 2022-08-17 RX ADMIN — OXYCODONE 5 MG: 5 TABLET ORAL at 15:54

## 2022-08-17 RX ADMIN — KETOROLAC TROMETHAMINE 30 MG: 30 INJECTION, SOLUTION INTRAMUSCULAR; INTRAVENOUS at 19:24

## 2022-08-17 RX ADMIN — OXYCODONE 10 MG: 5 TABLET ORAL at 09:37

## 2022-08-17 RX ADMIN — DOCUSATE SODIUM 100 MG: 100 CAPSULE, LIQUID FILLED ORAL at 09:20

## 2022-08-17 RX ADMIN — DIPHENHYDRAMINE HCL 25 MG: 25 TABLET, COATED ORAL at 13:19

## 2022-08-17 RX ADMIN — DOCUSATE SODIUM 100 MG: 100 CAPSULE, LIQUID FILLED ORAL at 17:54

## 2022-08-17 RX ADMIN — ENOXAPARIN SODIUM 40 MG: 40 INJECTION SUBCUTANEOUS at 09:22

## 2022-08-17 NOTE — PROGRESS NOTES
Progress Note - OB/GYN  Thania Fernandez 32 y o  female MRN: [id number]  Unit/Bed#: L&D 313-01 Encounter: [id number]    Assessment and Plan     Thania Fernandez is a patient of: [facility]  She is PPD# 1 s/p  repeat  section, low transverse incision  Recovering well and is stable       Status post repeat low transverse  section  Assessment & Plan  , Hgb 9 1 --> post op Hgb   Alvarenga removed@ 2100  Pending VTx1  Pain: Tylenol and toradol scheduled, lianna 5/10 PRN    FEN: Tolerating regular diet  DVT ppx: SCDs and  Lovenox 40mg qD  No BM or passing flatus  Incision C/D/I   PP Birth Control: undecided, has not been on birth control in the past, continue with further discussion of options post partum      History of marijuana use  Assessment & Plan  Hx MJ use  Post partum case management consult        Anemia  Assessment & Plan  Hemoglobin   Date Value Ref Range Status   2022 9 1 (L) 11 5 - 15 4 g/dL Final   2018 7 1 (L) 12 0 - 16 0 G/DL Final     Preop Hgb: 9 1  POD#1 AM CBC 7 7  Possible Venofer today, pt has reported iron allergy - will follow up if she can receive Venofer    Disposition    - Anticipate discharge home on PPD# 2-4      Subjective/Objective     Chief Complaint: Postpartum State     Subjective:    Britt Iyer is PPD/POD#1 s/p  repeat  section, low transverse incision  She has no current complaints  Pain is well controlled  Patient is currently voiding  She is ambulating  Patient is not currently passing flatus and has had no bowel movement  She is tolerating PO, and denies nausea or vomitting  Patient denies fever, chills, chest pain, shortness of breath, or calf tenderness  Lochia is normal  She is  Breastfeeding  She is recovering well and is stable         Vitals:   /59 (BP Location: Left arm)   Pulse 61   Temp 98 °F (36 7 °C) (Oral)   Resp 18   Ht 5' 2" (1 575 m)   Wt 92 1 kg (203 lb)   LMP 11/15/2021   SpO2 97% Breastfeeding No   BMI 37 13 kg/m²       Intake/Output Summary (Last 24 hours) at 8/17/2022 0610  Last data filed at 8/17/2022 0325  Gross per 24 hour   Intake 735 42 ml   Output 1479 ml   Net -743 58 ml       Invasive Devices  Timeline    Peripheral Intravenous Line  Duration           Peripheral IV 08/16/22 Dorsal (posterior); Left Forearm <1 day    Peripheral IV 08/16/22 Dorsal (posterior); Right Forearm <1 day                Physical Exam:   GEN: Leonardo Edouard appears well, alert and oriented x 3, pleasant and cooperative   CARDIO: RRR, no murmurs or rubs  RESP:  CTAB, no wheezes or rales  ABDOMEN: soft, no tenderness, no distention, fundus @ umbilicus, Incision C/D/I  EXTREMITIES: SCDs on, non tender, no erythema      Labs:     Hemoglobin   Date Value Ref Range Status   08/17/2022 7 7 (L) 11 5 - 15 4 g/dL Final   08/16/2022 9 1 (L) 11 5 - 15 4 g/dL Final   06/01/2018 7 1 (L) 12 0 - 16 0 G/DL Final   05/22/2018 7 1 (L) 12 0 - 16 0 G/DL Final     WBC   Date Value Ref Range Status   08/17/2022 9 20 4  31 - 10 16 Thousand/uL Final   08/16/2022 8 43 4 31 - 10 16 Thousand/uL Final   06/01/2018 8 3 4 5 - 11 0 K/MCL Final   05/22/2018 7 0 4 5 - 11 0 K/MCL Final     Platelets   Date Value Ref Range Status   08/17/2022 191 149 - 390 Thousands/uL Final   08/16/2022 253 149 - 390 Thousands/uL Final   06/01/2018 233 150 - 450 K/MCL Final   05/22/2018 188 150 - 450 K/MCL Final     Creatinine   Date Value Ref Range Status   02/12/2022 0 47 (L) 0 60 - 1 30 mg/dL Final     Comment:     Standardized to IDMS reference method   11/28/2017 0 40 (L) 0 60 - 1 30 mg/dL Final     Comment:     Standardized to IDMS reference method     AST   Date Value Ref Range Status   02/12/2022 11 5 - 45 U/L Final     Comment:     Specimen collection should occur prior to Sulfasalazine administration due to the potential for falsely depressed results      06/01/2018 14 14 - 36 U/L Final   05/22/2018 12 (L) 14 - 36 U/L Final     ALT   Date Value Ref Range Status   02/12/2022 21 12 - 78 U/L Final     Comment:     Specimen collection should occur prior to Sulfasalazine administration due to the potential for falsely depressed results      06/01/2018 22 9 - 52 U/L Final   05/22/2018 16 9 - 52 U/L Final          Rogerio Curtis DO  8/17/2022  6:10 AM

## 2022-08-17 NOTE — PLAN OF CARE

## 2022-08-17 NOTE — CASE MANAGEMENT
Case Management Assessment    Patient name London Jiang  Location L&D 313/L&D 474-36 MRN [id number]  : 1995 Date 2022       Current Admission Date: 2022  Current Admission Diagnosis:39 weeks gestation of pregnancy   Patient Active Problem List    Diagnosis Date Noted    Status post repeat low transverse  section 2022    History of marijuana use 2022    Non-compliant pregnant patient, third trimester 2022    39 weeks gestation of pregnancy 2022    Polyhydramnios in third trimester 2022    Fetal macrosomia in pregnancy in third trimester 2022    History of 3  sections 2022    History of gestational hypertension 2022    History of transfusion of packed RBC 2018     2018    Anemia 2014      LOS (days): 1  Geometric Mean LOS (GMLOS) (days):   Days to GMLOS:     OBJECTIVE:    Risk of Unplanned Readmission Score: 4 5         Current admission status: Inpatient       Preferred Pharmacy:   [address] [place] [id number]  Phone: [phone] Fax: [phone]    [address] [place] [address] , [address] [place] [id number]  Phone: [phone] Fax: [phone]    Primary Care Provider: Julia Arce MD    Primary Insurance: [employer]  Secondary Insurance:     ASSESSMENT:  [address] Proxies    There are no active Health Care Proxies on file         Consult(s): UDS+ THC, limited PNC    CM met w/MOB who provided the following information:      · Baby's name/gender: Condomínio Nossa Senhora De Mara [id number]  · Mother of baby: Jarvis Banegas  · Father of baby//SO: Clintyenny Dickens [phone]  · Other Legal Guardian(s) for baby: No  · Alternate emergency contact: Paternal grandmother (FOB's mother) Buffy Caraballo [phone]  · Other children: 3 - 2 boys, 1 girl  · Lives with: MOB, FOB, children  · Support System: FOB's mother  · Baby Supplies:  Has all  · Bottle or Breast Feeding: Bottle/formula  · Breast Pump if breast feeding: N/A  · Government Assistance Programs/WIC/EBT/SSI: WIC, SNAP, and SSI   · 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, FOB works FT    · Transportation: FOB  · Prenatal care: Limited/gaps in [facility]: MOB reports she used several different OBGYN offices and does not drive (FOB drives her)  · Pediatrician: [id number]  · [address] Hx or Treatment: MOB denies  · Substance Abuse: UDS+ for THC for MOB only   · Hx DV/IPV: MOB denies  · Legal (probation/parole/incarceration): MOB denies  · Community Referrals/C&Y/NFP: CM made online report d/t positive UDS  Parents made aware of report being made and had no questions or concerns at this time  · Insurance for baby: [facility] [employer] [facility]     MOB denies any other CM needs at this time  Encouraged family to contact CM as needed

## 2022-08-17 NOTE — ASSESSMENT & PLAN NOTE
, Hgb 9 1 --> post op Hgb 7 7, Venofer-> POD#2 Hgb 7 8  Voiding spontaneously  Pain: Tylenol and toradol scheduled, lianna 5/10 PRN    FEN: Tolerating regular diet  DVT ppx: SCDs and  Lovenox 40mg qD  Passing flatus   No BM  Incision C/D/I   PP Birth Control: considering OCPs,  continue with further discussion of options post partum

## 2022-08-18 VITALS
TEMPERATURE: 98 F | DIASTOLIC BLOOD PRESSURE: 71 MMHG | BODY MASS INDEX: 37.36 KG/M2 | OXYGEN SATURATION: 97 % | HEIGHT: 62 IN | HEART RATE: 68 BPM | SYSTOLIC BLOOD PRESSURE: 124 MMHG | WEIGHT: 203 LBS | RESPIRATION RATE: 16 BRPM

## 2022-08-18 LAB
ERYTHROCYTE [DISTWIDTH] IN BLOOD BY AUTOMATED COUNT: 17 % (ref 11.6–15.1)
HCT VFR BLD AUTO: 26.1 % (ref 34.8–46.1)
HGB BLD-MCNC: 7.8 G/DL (ref 11.5–15.4)
MCH RBC QN AUTO: 21.6 PG (ref 26.8–34.3)
MCHC RBC AUTO-ENTMCNC: 29.9 G/DL (ref 31.4–37.4)
MCV RBC AUTO: 72 FL (ref 82–98)
PLATELET # BLD AUTO: 197 THOUSANDS/UL (ref 149–390)
PMV BLD AUTO: 11.4 FL (ref 8.9–12.7)
RBC # BLD AUTO: 3.61 MILLION/UL (ref 3.81–5.12)
WBC # BLD AUTO: 7.77 THOUSAND/UL (ref 4.31–10.16)

## 2022-08-18 PROCEDURE — 85027 COMPLETE CBC AUTOMATED: CPT | Performed by: OBSTETRICS & GYNECOLOGY

## 2022-08-18 PROCEDURE — 99024 POSTOP FOLLOW-UP VISIT: CPT | Performed by: OBSTETRICS & GYNECOLOGY

## 2022-08-18 RX ORDER — OXYCODONE HYDROCHLORIDE 5 MG/1
5 TABLET ORAL EVERY 4 HOURS PRN
Qty: 10 TABLET | Refills: 0 | Status: SHIPPED | OUTPATIENT
Start: 2022-08-18 | End: 2022-08-28

## 2022-08-18 RX ORDER — POLYETHYLENE GLYCOL 3350 17 G/17G
17 POWDER, FOR SOLUTION ORAL DAILY
Status: DISCONTINUED | OUTPATIENT
Start: 2022-08-18 | End: 2022-08-18 | Stop reason: HOSPADM

## 2022-08-18 RX ORDER — ACETAMINOPHEN 325 MG/1
650 TABLET ORAL EVERY 6 HOURS SCHEDULED
Qty: 14 TABLET | Refills: 0
Start: 2022-08-18 | End: 2022-08-20

## 2022-08-18 RX ORDER — DOCUSATE SODIUM 100 MG/1
100 CAPSULE, LIQUID FILLED ORAL 2 TIMES DAILY PRN
Qty: 60 CAPSULE | Refills: 0 | Status: SHIPPED | OUTPATIENT
Start: 2022-08-18 | End: 2022-10-19

## 2022-08-18 RX ORDER — IBUPROFEN 600 MG/1
600 TABLET ORAL EVERY 6 HOURS PRN
Qty: 60 TABLET | Refills: 0 | Status: SHIPPED | OUTPATIENT
Start: 2022-08-18 | End: 2022-10-19

## 2022-08-18 RX ADMIN — ACETAMINOPHEN 650 MG: 325 TABLET, FILM COATED ORAL at 11:53

## 2022-08-18 RX ADMIN — ACETAMINOPHEN 650 MG: 325 TABLET, FILM COATED ORAL at 00:04

## 2022-08-18 RX ADMIN — OXYCODONE 10 MG: 5 TABLET ORAL at 02:45

## 2022-08-18 RX ADMIN — OXYCODONE 10 MG: 5 TABLET ORAL at 11:53

## 2022-08-18 RX ADMIN — KETOROLAC TROMETHAMINE 30 MG: 30 INJECTION, SOLUTION INTRAMUSCULAR; INTRAVENOUS at 11:54

## 2022-08-18 RX ADMIN — KETOROLAC TROMETHAMINE 30 MG: 30 INJECTION, SOLUTION INTRAMUSCULAR; INTRAVENOUS at 08:09

## 2022-08-18 RX ADMIN — POLYETHYLENE GLYCOL 3350 17 G: 17 POWDER, FOR SOLUTION ORAL at 08:08

## 2022-08-18 RX ADMIN — KETOROLAC TROMETHAMINE 30 MG: 30 INJECTION, SOLUTION INTRAMUSCULAR; INTRAVENOUS at 01:32

## 2022-08-18 RX ADMIN — DOCUSATE SODIUM 100 MG: 100 CAPSULE, LIQUID FILLED ORAL at 08:08

## 2022-08-18 RX ADMIN — ENOXAPARIN SODIUM 40 MG: 40 INJECTION SUBCUTANEOUS at 08:08

## 2022-08-18 RX ADMIN — ACETAMINOPHEN 650 MG: 325 TABLET, FILM COATED ORAL at 06:13

## 2022-08-18 NOTE — PLAN OF CARE
Problem: BIRTH - VAGINAL/ SECTION  Goal: Fetal and maternal status remain reassuring during the birth process  Description: INTERVENTIONS:  - Monitor vital signs  - Monitor fetal heart rate  - Monitor uterine activity  - Monitor labor progression (vaginal delivery)  - DVT prophylaxis  - Antibiotic prophylaxis  Outcome: Progressing  Goal: Emotionally satisfying birthing experience for mother/fetus  Description: Interventions:  - Assess, plan, implement and evaluate the nursing care given to the patient in labor  - Advocate the philosophy that each childbirth experience is a unique experience and support the family's chosen level of involvement and control during the labor process   - Actively participate in both the patient's and family's teaching of the birth process  - Consider cultural, Congregation and age-specific factors and plan care for the patient in labor  Outcome: Progressing     Problem: PAIN - ADULT  Goal: Verbalizes/displays adequate comfort level or baseline comfort level  Description: Interventions:  - Encourage patient to monitor pain and request assistance  - Assess pain using appropriate pain scale  - Administer analgesics based on type and severity of pain and evaluate response  - Implement non-pharmacological measures as appropriate and evaluate response  - Consider cultural and social influences on pain and pain management  - Notify physician/advanced practitioner if interventions unsuccessful or patient reports new pain  Outcome: Progressing     Problem: INFECTION - ADULT  Goal: Absence or prevention of progression during hospitalization  Description: INTERVENTIONS:  - Assess and monitor for signs and symptoms of infection  - Monitor lab/diagnostic results  - Monitor all insertion sites, i e  indwelling lines, tubes, and drains  - Monitor endotracheal if appropriate and nasal secretions for changes in amount and color  - [place] appropriate cooling/warming therapies per order  - Administer medications as ordered  - Instruct and encourage patient and family to use good hand hygiene technique  - Identify and instruct in appropriate isolation precautions for identified infection/condition  Outcome: Progressing  Goal: Absence of fever/infection during neutropenic period  Description: INTERVENTIONS:  - Monitor WBC    Outcome: Progressing     Problem: SAFETY ADULT  Goal: Patient will remain free of falls  Description: INTERVENTIONS:  - Educate patient/family on patient safety including physical limitations  - Instruct patient to call for assistance with activity   - Consult OT/PT to assist with strengthening/mobility   - Keep Call bell within reach  - Keep bed low and locked with side rails adjusted as appropriate  - Keep care items and personal belongings within reach  - Initiate and maintain comfort rounds  - Make Fall Risk Sign visible to staff  - Offer Toileting every  Hours, in advance of need  - Initiate/Maintain alarm  - Obtain necessary fall risk management equipment:   - Apply yellow socks and bracelet for high fall risk patients  - Consider moving patient to room near nurses station  Outcome: Progressing  Goal: Maintain or return to baseline ADL function  Description: INTERVENTIONS:  -  Assess patient's ability to carry out ADLs; assess patient's baseline for ADL function and identify physical deficits which impact ability to perform ADLs (bathing, care of mouth/teeth, toileting, grooming, dressing, etc )  - Assess/evaluate cause of self-care deficits   - Assess range of motion  - Assess patient's mobility; develop plan if impaired  - Assess patient's need for assistive devices and provide as appropriate  - Encourage maximum independence but intervene and supervise when necessary  - Involve family in performance of ADLs  - Assess for home care needs following discharge   - Consider OT consult to assist with ADL evaluation and planning for discharge  - Provide patient education as appropriate  Outcome: Progressing  Goal: Maintains/Returns to pre admission functional level  Description: INTERVENTIONS:  - Perform BMAT or MOVE assessment daily    - Set and communicate daily mobility goal to care team and patient/family/caregiver  - Collaborate with rehabilitation services on mobility goals if consulted  - Perform Range of Motion  times a day  - Reposition patient every hours  - Dangle patient  times a day  - Stand patient  times a day  - Ambulate patient  times a day  - Out of bed to chair  times a day   - Out of bed for meals times a day  - Out of bed for toileting  - Record patient progress and toleration of activity level   Outcome: Progressing     Problem: Knowledge Deficit  Goal: Patient/family/caregiver demonstrates understanding of disease process, treatment plan, medications, and discharge instructions  Description: Complete learning assessment and assess knowledge base    Interventions:  - Provide teaching at level of understanding  - Provide teaching via preferred learning methods  Outcome: Progressing     Problem: DISCHARGE PLANNING  Goal: Discharge to home or other facility with appropriate resources  Description: INTERVENTIONS:  - Identify barriers to discharge w/patient and caregiver  - Arrange for needed discharge resources and transportation as appropriate  - Identify discharge learning needs (meds, wound care, etc )  - Arrange for interpretive services to assist at discharge as needed  - Refer to Case Management Department for coordinating discharge planning if the patient needs post-hospital services based on physician/advanced practitioner order or complex needs related to functional status, cognitive ability, or social support system  Outcome: Progressing

## 2022-08-18 NOTE — PROGRESS NOTES
Progress Note - OB/GYN  Thania Piña 32 y o  female MRN: [id number]  Unit/Bed#: L&D 313-01 Encounter: [id number]    Assessment and Plan     Thania iPña is a patient of: [facility]  She is PPD# 1 s/p  repeat  section, low transverse incision  Recovering well and is stable       Status post repeat low transverse  section  Assessment & Plan  , Hgb 9 1 --> post op Hgb 7 7, Venofer-> POD#2 Hgb 7 8  Voiding spontaneously  Pain: Tylenol and toradol scheduled, lianna 5/10 PRN    FEN: Tolerating regular diet  DVT ppx: SCDs and  Lovenox 40mg qD  Passing flatus  No BM  Incision C/D/I   PP Birth Control: considering OCPs,  continue with further discussion of options post partum      History of marijuana use  Assessment & Plan  Hx MJ use  Post partum case management consult        Anemia  Assessment & Plan  Hemoglobin   Date Value Ref Range Status   2022 9 1 (L) 11 5 - 15 4 g/dL Final   2018 7 1 (L) 12 0 - 16 0 G/DL Final     Preop Hgb: 9 1  POD#1 AM CBC 7 7, Venofer given   POD#2 AM CBC 7 8, pt asymptomatic this morning      Disposition    - Anticipate discharge home on PPD# 2-4    Subjective/Objective     Chief Complaint: Postpartum State     Subjective:    Hetal Hudson is PPD/POD#2 s/p  repeat  section, low transverse incision  She has no current complaints  Pain is well controlled  Patient is currently voiding  She is ambulating  Patient is  currently passing flatus and has had no bowel movement  She is tolerating PO, and denies nausea or vomitting  Patient denies fever, chills, chest pain, shortness of breath, or calf tenderness  Lochia is normal  She is  Breastfeeding  She is recovering well and is stable       Vitals:   /71 (BP Location: Right arm)   Pulse 68   Temp 98 °F (36 7 °C) (Oral)   Resp 16   Ht 5' 2" (1 575 m)   Wt 92 1 kg (203 lb)   LMP 11/15/2021   SpO2 97%   Breastfeeding No   BMI 37 13 kg/m²     No intake or output data in the 24 hours ending 08/18/22 1017    Invasive Devices  Timeline    Peripheral Intravenous Line  Duration           Peripheral IV 08/16/22 Dorsal (posterior); Left Forearm 2 days    Peripheral IV 08/16/22 Dorsal (posterior); Right Forearm 2 days                Physical Exam:   GEN: Jono Ortiz appears well, alert and oriented x 3, pleasant and cooperative   CARDIO: RRR, no murmurs or rubs  RESP:  CTAB, no wheezes or rales  ABDOMEN: soft, no tenderness, no distention, fundus @ u-1, Incision C/D/I  EXTREMITIES:  non tender, no erythema, b/l Rosey's sign negative      Labs:     Hemoglobin   Date Value Ref Range Status   08/18/2022 7 8 (L) 11 5 - 15 4 g/dL Final   08/17/2022 7 7 (L) 11 5 - 15 4 g/dL Final   06/01/2018 7 1 (L) 12 0 - 16 0 G/DL Final   05/22/2018 7 1 (L) 12 0 - 16 0 G/DL Final     WBC   Date Value Ref Range Status   08/18/2022 7 77 4 31 - 10 16 Thousand/uL Final   08/17/2022 9 20 4  31 - 10 16 Thousand/uL Final   06/01/2018 8 3 4 5 - 11 0 K/MCL Final   05/22/2018 7 0 4 5 - 11 0 K/MCL Final     Platelets   Date Value Ref Range Status   08/18/2022 197 149 - 390 Thousands/uL Final   08/17/2022 191 149 - 390 Thousands/uL Final   06/01/2018 233 150 - 450 K/MCL Final   05/22/2018 188 150 - 450 K/MCL Final     Creatinine   Date Value Ref Range Status   02/12/2022 0 47 (L) 0 60 - 1 30 mg/dL Final     Comment:     Standardized to IDMS reference method   11/28/2017 0 40 (L) 0 60 - 1 30 mg/dL Final     Comment:     Standardized to IDMS reference method     AST   Date Value Ref Range Status   02/12/2022 11 5 - 45 U/L Final     Comment:     Specimen collection should occur prior to Sulfasalazine administration due to the potential for falsely depressed results      06/01/2018 14 14 - 36 U/L Final   05/22/2018 12 (L) 14 - 36 U/L Final     ALT   Date Value Ref Range Status   02/12/2022 21 12 - 78 U/L Final     Comment:     Specimen collection should occur prior to Sulfasalazine administration due to the potential for falsely depressed results      06/01/2018 22 9 - 52 U/L Final   05/22/2018 16 9 - 52 U/L Final          Jesse Tong DO  8/18/2022  10:17 AM

## 2022-08-18 NOTE — CASE MANAGEMENT
Case Management Discharge Planning Note    Patient name Sharyle June Location L&D 313/L&D 952-66 MRN [id number]  : 1995 Date 2022       Current Admission Date: 2022  Current Admission Diagnosis:39 weeks gestation of pregnancy   Patient Active Problem List    Diagnosis Date Noted    Status post repeat low transverse  section 2022    History of marijuana use 2022    Non-compliant pregnant patient, third trimester 2022    39 weeks gestation of pregnancy 2022    Polyhydramnios in third trimester 2022    Fetal macrosomia in pregnancy in third trimester 2022    History of 3  sections 2022    History of gestational hypertension 2022    History of transfusion of packed RBC 2018     2018    Anemia 2014      LOS (days): 2  Geometric Mean LOS (GMLOS) (days):   Days to GMLOS:     OBJECTIVE:  Risk of Unplanned Readmission Score: 4 56         Current admission status: Inpatient   Preferred Pharmacy:   [address] [place], [place] [address] Rd  [address] [place] [id number]  Phone: [phone] Fax: 1582 [facility] [address] [place] [address], [address] [place] [address]  Phone: [phone] Fax: [phone]    Primary Care Provider: Hoang Jaramillo MD    Primary Insurance: Candice Gray  Secondary Insurance:     DISCHARGE DETAILS:    CM called [facility] stated that she spoke with parents and baby is cleared for dc home today  Katie scheduled home visit with family for after dc

## 2022-08-18 NOTE — NURSING NOTE
Discharge education reviewed with patient at bedside  Educational packet and "save your life" magnet provided  Opportunity for questions given  Patient verbalized understanding, and no questions at this time

## 2022-08-18 NOTE — DISCHARGE SUMMARY
Discharge Summary - OB/GYN   Thania Mishra 32 y o  female MRN: [id number]  Unit/Bed#: L&D 713-96 Encounter: [id number]      Admission Date: 2022     Discharge Date: 22    Admitting Diagnosis:   1  Pregnancy at 39w1d  2  Maternal anemia  3  Suspected fetal macrosomia    Discharge Diagnosis:   Same, delivered    Procedures: repeat  section, low transverse incision    Delivering Attending: [address]  [facility] Course:     Zaida Miranda is a 32 y o  [id number] at 39w1d wks who was initially admitted for RLTCS  She delivered a viable male  on 22 at 99 [id number]  Weight 8lbs 10oz via repeat  section, low transverse incision  Apgars were 8 (1 min) and 9 (5 min)   was transferred to  nursery  Patient tolerated the procedure well and was transferred to recovery in stable condition  Her post-operative course was complicated by maternal anemia  Preoperative hemaglobin was 9 1, postoperative was 7 7  Her postoperative pain was well controlled with oral analgesics  She received venofer for a Hgb of 7 8  On day of discharge, she was ambulating and able to reasonably perform all ADLs  She was voiding and had appropriate bowel function  Pain was well controlled  She was discharged home on post-operative day #2 without complications  Patient was instructed to follow up with her OB as an outpatient and was given appropriate warnings to call provider if she develops signs of infection or uncontrolled pain  Complications: none apparent    Condition at discharge: good     Discharge instructions/Information to patient and family:   See after visit summary for information provided to patient and family  Provisions for Follow-Up Care:  See after visit summary for information related to follow-up care and any pertinent home health orders  Disposition: See After Visit Summary for discharge disposition information      Planned Readmission: No    Discharge Medications: For a complete list of the patient's medications, please refer to her med rec

## 2022-08-18 NOTE — PLAN OF CARE
Problem: BIRTH - VAGINAL/ SECTION  Goal: Fetal and maternal status remain reassuring during the birth process  Description: INTERVENTIONS:  - Monitor vital signs  - Monitor fetal heart rate  - Monitor uterine activity  - Monitor labor progression (vaginal delivery)  - DVT prophylaxis  - Antibiotic prophylaxis  Outcome: Completed  Goal: Emotionally satisfying birthing experience for mother/fetus  Description: Interventions:  - Assess, plan, implement and evaluate the nursing care given to the patient in labor  - Advocate the philosophy that each childbirth experience is a unique experience and support the family's chosen level of involvement and control during the labor process   - Actively participate in both the patient's and family's teaching of the birth process  - Consider cultural, Roman Catholic and age-specific factors and plan care for the patient in labor  Outcome: Completed     Problem: PAIN - ADULT  Goal: Verbalizes/displays adequate comfort level or baseline comfort level  Description: Interventions:  - Encourage patient to monitor pain and request assistance  - Assess pain using appropriate pain scale  - Administer analgesics based on type and severity of pain and evaluate response  - Implement non-pharmacological measures as appropriate and evaluate response  - Consider cultural and social influences on pain and pain management  - Notify physician/advanced practitioner if interventions unsuccessful or patient reports new pain  Outcome: Completed     Problem: INFECTION - ADULT  Goal: Absence or prevention of progression during hospitalization  Description: INTERVENTIONS:  - Assess and monitor for signs and symptoms of infection  - Monitor lab/diagnostic results  - Monitor all insertion sites, i e  indwelling lines, tubes, and drains  - Monitor endotracheal if appropriate and nasal secretions for changes in amount and color  - [place] appropriate cooling/warming therapies per order  - Administer medications as ordered  - Instruct and encourage patient and family to use good hand hygiene technique  - Identify and instruct in appropriate isolation precautions for identified infection/condition  Outcome: Completed  Goal: Absence of fever/infection during neutropenic period  Description: INTERVENTIONS:  - Monitor WBC    Outcome: Completed     Problem: SAFETY ADULT  Goal: Patient will remain free of falls  Description: INTERVENTIONS:  - Educate patient/family on patient safety including physical limitations  - Instruct patient to call for assistance with activity   - Consult OT/PT to assist with strengthening/mobility   - Keep Call bell within reach  - Keep bed low and locked with side rails adjusted as appropriate  - Keep care items and personal belongings within reach  - Initiate and maintain comfort rounds  - Make Fall Risk Sign visible to staff    - Apply yellow socks and bracelet for high fall risk patients  - Consider moving patient to room near nurses station  Outcome: Completed  Goal: Maintain or return to baseline ADL function  Description: INTERVENTIONS:  -  Assess patient's ability to carry out ADLs; assess patient's baseline for ADL function and identify physical deficits which impact ability to perform ADLs (bathing, care of mouth/teeth, toileting, grooming, dressing, etc )  - Assess/evaluate cause of self-care deficits   - Assess range of motion  - Assess patient's mobility; develop plan if impaired  - Assess patient's need for assistive devices and provide as appropriate  - Encourage maximum independence but intervene and supervise when necessary  - Involve family in performance of ADLs  - Assess for home care needs following discharge   - Consider OT consult to assist with ADL evaluation and planning for discharge  - Provide patient education as appropriate  Outcome: Completed  Goal: Maintains/Returns to pre admission functional level  Description: INTERVENTIONS:  - Perform BMAT or MOVE assessment daily    - Set and communicate daily mobility goal to care team and patient/family/caregiver  - Collaborate with rehabilitation services on mobility goals if consulted    - Out of bed for toileting  - Record patient progress and toleration of activity level   Outcome: Completed     Problem: Knowledge Deficit  Goal: Patient/family/caregiver demonstrates understanding of disease process, treatment plan, medications, and discharge instructions  Description: Complete learning assessment and assess knowledge base    Interventions:  - Provide teaching at level of understanding  - Provide teaching via preferred learning methods  Outcome: Completed     Problem: DISCHARGE PLANNING  Goal: Discharge to home or other facility with appropriate resources  Description: INTERVENTIONS:  - Identify barriers to discharge w/patient and caregiver  - Arrange for needed discharge resources and transportation as appropriate  - Identify discharge learning needs (meds, wound care, etc )  - Arrange for interpretive services to assist at discharge as needed  - Refer to Case Management Department for coordinating discharge planning if the patient needs post-hospital services based on physician/advanced practitioner order or complex needs related to functional status, cognitive ability, or social support system  Outcome: Completed

## 2022-08-19 NOTE — UTILIZATION REVIEW
Inpatient Admission Authorization Request   Notification of Maternity/Delivery &  Birth Information for Admission   SERVICING FACILITY:   [address] [place] [address], [facility], [facility]  Tax ID: [id number]  NPI: [id number]  Place of Service: Inpatient [address] [facility]  [address]  Place of Service Code: 24     ATTENDING PROVIDER:  Attending Name and NPI#: Alvin Rodgers Md [[id number]]  Address: [address] [place] [address], [place] [address] [address] [facility]  Phone: [phone]     UTILIZATION REVIEW CONTACT:  Camden Tyson Utilization   Network Utilization Review Department  Phone: [phone]  Fax [phone]  Email: [email or web address]     PHYSICIAN ADVISORY SERVICES:  FOR [id number] REVIEW - MEDICAL NECESSITY DENIAL  Phone: [phone]  Fax: [phone]  Email: [email or web address]     TYPE OF REQUEST:  Inpatient Status     ADMISSION INFORMATION:  ADMISSION DATE/TIME: 22  5:57 AM  PATIENT DIAGNOSIS CODE/DESCRIPTION:  Encounter for  delivery without indication [O82] The primary encounter diagnosis was Status post repeat low transverse  section  Diagnoses of Fetal macrosomia during pregnancy in third trimester, single or unspecified fetus, History of 3  sections, 39 weeks gestation of pregnancy, and History of marijuana use were also pertinent to this visit  1  Status post repeat low transverse  section    2  Fetal macrosomia during pregnancy in third trimester, single or unspecified fetus    3  History of 3  sections    4  39 weeks gestation of pregnancy    5   History of marijuana use      DISCHARGE DATE/TIME: 2022  2:35 PM   MOTHER AND  INFORMATION:  Mother: Yehuda Garcia 1995   Delivering clinician:    OB History        4    Para   4    Term   4            AB        Living   4       SAB        IAB        Ectopic        Multiple   0    Live Births   4 [place] Name & MRN:   Information for the patient's :  [facility] [[id number]]      Delivery Information:  Sex: male  Delivered 2022 9:34 AM by ; Gestational Age: 36w3d     Measurements:  Weight: 8 lb 10 3 oz (3920 g); Height: 20 5"    APGAR 1 minute 5 minutes 10 minutes   Totals: 8 9       Birth Information: 32 y o  female MRN: [id number] Unit/Bed#: L&D 313-01 Estimated Date of Delivery: 22  Birthweight: No birth weight on file  Gestational Age: <None> Delivery Type:       IMPORTANT INFORMATION:  Please contact Dana Casanova directly with any questions or concerns regarding this request  Department voicemails are confidential     Send requests for admission clinical reviews, concurrent reviews, approvals, and administrative denials due to lack of clinical to fax [phone]

## 2022-08-20 LAB
ABO GROUP BLD BPU: NORMAL
ABO GROUP BLD BPU: NORMAL
BPU ID: NORMAL
BPU ID: NORMAL
CROSSMATCH: NORMAL
CROSSMATCH: NORMAL
UNIT DISPENSE STATUS: NORMAL
UNIT DISPENSE STATUS: NORMAL
UNIT PRODUCT CODE: NORMAL
UNIT PRODUCT CODE: NORMAL
UNIT PRODUCT VOLUME: 350 ML
UNIT PRODUCT VOLUME: 350 ML
UNIT RH: NORMAL
UNIT RH: NORMAL

## 2022-08-24 LAB — PLACENTA IN STORAGE: NORMAL

## 2022-10-19 ENCOUNTER — OFFICE VISIT (OUTPATIENT)
Dept: FAMILY MEDICINE CLINIC | Facility: CLINIC | Age: 27
End: 2022-10-19
Payer: COMMERCIAL

## 2022-10-19 VITALS
HEIGHT: 62 IN | HEART RATE: 73 BPM | TEMPERATURE: 96.9 F | SYSTOLIC BLOOD PRESSURE: 112 MMHG | WEIGHT: 183 LBS | OXYGEN SATURATION: 97 % | BODY MASS INDEX: 33.68 KG/M2 | DIASTOLIC BLOOD PRESSURE: 72 MMHG

## 2022-10-19 DIAGNOSIS — Z28.21 REFUSED INFLUENZA VACCINE: ICD-10-CM

## 2022-10-19 DIAGNOSIS — Z01.00 EYE EXAM NORMAL: ICD-10-CM

## 2022-10-19 DIAGNOSIS — M54.50 CHRONIC LOW BACK PAIN, UNSPECIFIED BACK PAIN LATERALITY, UNSPECIFIED WHETHER SCIATICA PRESENT: ICD-10-CM

## 2022-10-19 DIAGNOSIS — G89.29 CHRONIC LOW BACK PAIN, UNSPECIFIED BACK PAIN LATERALITY, UNSPECIFIED WHETHER SCIATICA PRESENT: ICD-10-CM

## 2022-10-19 DIAGNOSIS — Z13.89 SCREENING FOR MULTIPLE CONDITIONS: ICD-10-CM

## 2022-10-19 DIAGNOSIS — J45.30 MILD PERSISTENT ASTHMA WITHOUT COMPLICATION: ICD-10-CM

## 2022-10-19 DIAGNOSIS — Z00.00 WELL ADULT EXAM: Primary | ICD-10-CM

## 2022-10-19 PROBLEM — Z91.199 NON-COMPLIANT PREGNANT PATIENT, THIRD TRIMESTER: Status: RESOLVED | Noted: 2022-06-30 | Resolved: 2022-10-19

## 2022-10-19 PROBLEM — O09.893 NON-COMPLIANT PREGNANT PATIENT, THIRD TRIMESTER: Status: RESOLVED | Noted: 2022-06-30 | Resolved: 2022-10-19

## 2022-10-19 PROBLEM — Z3A.39 39 WEEKS GESTATION OF PREGNANCY: Status: RESOLVED | Noted: 2022-06-30 | Resolved: 2022-10-19

## 2022-10-19 PROBLEM — Z98.891 STATUS POST REPEAT LOW TRANSVERSE CESAREAN SECTION: Status: RESOLVED | Noted: 2022-08-17 | Resolved: 2022-10-19

## 2022-10-19 PROBLEM — O40.3XX0 POLYHYDRAMNIOS IN THIRD TRIMESTER: Status: RESOLVED | Noted: 2022-06-09 | Resolved: 2022-10-19

## 2022-10-19 PROBLEM — Z92.89 HISTORY OF TRANSFUSION OF PACKED RBC: Status: RESOLVED | Noted: 2018-05-23 | Resolved: 2022-10-19

## 2022-10-19 PROBLEM — O36.63X0 FETAL MACROSOMIA IN PREGNANCY IN THIRD TRIMESTER: Status: RESOLVED | Noted: 2022-06-09 | Resolved: 2022-10-19

## 2022-10-19 PROCEDURE — 99385 PREV VISIT NEW AGE 18-39: CPT | Performed by: PHYSICIAN ASSISTANT

## 2022-10-19 RX ORDER — ALBUTEROL SULFATE 90 UG/1
2 AEROSOL, METERED RESPIRATORY (INHALATION) EVERY 6 HOURS PRN
Qty: 6.7 G | Refills: 5 | Status: SHIPPED | OUTPATIENT
Start: 2022-10-19

## 2022-10-19 NOTE — PATIENT INSTRUCTIONS

## 2022-10-19 NOTE — PROGRESS NOTES
[facility]    NAME: Amy Bennett  AGE: 32 y o  SEX: female  : 1995     DATE: 10/19/2022     Assessment and Plan:     Problem List Items Addressed This Visit        Respiratory    Mild persistent asthma without complication    Relevant Medications    albuterol (Proventil HFA) 90 mcg/act inhaler      Other Visit Diagnoses     Well adult exam    -  Primary    Refused influenza vaccine        Screening for multiple conditions        Relevant Orders    CBC    Comprehensive metabolic panel    Lipid Panel with Direct LDL reflex    Eye exam normal        Relevant Orders    Ambulatory Referral to Ophthalmology    Chronic low back pain, unspecified back pain laterality, unspecified whether sciatica present        Relevant Orders    Ambulatory Referral to Chiropractic          Immunizations and preventive care screenings were discussed with patient today  Appropriate education was printed on patient's after visit summary  Counseling:  Exercise: the importance of regular exercise/physical activity was discussed  Recommend exercise 3-5 times per week for at least 30 minutes  BMI Counseling: Body mass index is 33 47 kg/m²  The BMI is above normal  Nutrition recommendations include decreasing portion sizes, encouraging healthy choices of fruits and vegetables, decreasing fast food intake, consuming healthier snacks, limiting drinks that contain sugar, moderation in carbohydrate intake, increasing intake of lean protein, reducing intake of saturated and trans fat and reducing intake of cholesterol  Exercise recommendations include moderate physical activity 150 minutes/week  Rationale for BMI follow-up plan is due to patient being overweight or obese  Depression Screening and Follow-up Plan: Patient was screened for depression during today's encounter  They screened negative with a PHQ-2 score of 0          Return in about 1 year (around 10/19/2023)  Chief Complaint:     Chief Complaint   Patient presents with   • Annual Exam     Denies flu vaccine       History of Present Illness:     Adult Annual Physical   Patient here for a comprehensive physical exam  The patient reports no problems  Diet and Physical Activity  Diet/Nutrition: limited junk food  Exercise: no formal exercise  Depression Screening  PHQ-2/9 Depression Screening    Little interest or pleasure in doing things: 0 - not at all  Feeling down, depressed, or hopeless: 0 - not at all  PHQ-2 Score: 0  PHQ-2 Interpretation: Negative depression screen       General Health  Sleep: sleeps well  Hearing: normal - bilateral   Vision: Pt needs referral for eye exam    Dental: no dental visits for >1 year  Review of Systems:     Review of Systems   Constitutional: Negative for activity change, appetite change, chills, diaphoresis, fatigue, fever and unexpected weight change  HENT: Negative for congestion, ear pain, postnasal drip, rhinorrhea, sinus pressure, sinus pain, sneezing, sore throat, tinnitus and voice change  Eyes: Negative for pain, redness and visual disturbance  Respiratory: Negative for cough, chest tightness, shortness of breath and wheezing  Cardiovascular: Negative for chest pain, palpitations and leg swelling  Gastrointestinal: Negative for abdominal pain, blood in stool, constipation, diarrhea, nausea and vomiting  Genitourinary: Negative for difficulty urinating, dysuria, frequency, hematuria and urgency  Musculoskeletal: Negative for arthralgias, back pain, gait problem, joint swelling, myalgias, neck pain and neck stiffness  Skin: Negative for color change, pallor, rash and wound  Neurological: Negative for dizziness, tremors, weakness, light-headedness and headaches  Psychiatric/Behavioral: Negative for dysphoric mood, self-injury, sleep disturbance and suicidal ideas  The patient is not nervous/anxious         Past Medical History:     Past Medical History:   Diagnosis Date   • Asthma       Past Surgical History:     Past Surgical History:   Procedure Laterality Date   •  SECTION     • AL  DELIVERY ONLY N/A 2018    Procedure:  SECTION () REPEAT;  Surgeon: Jerzy Grullon DO;  Location: [facility];  Service: Obstetrics   • AL  DELIVERY ONLY N/A 2022    Procedure: Brianna Shelton () REPEAT;  Surgeon: Jeannette Mclain MD;  Location: [facility];  Service: Obstetrics      Social History:     Social History     Socioeconomic History   • Marital status:      Spouse name: None   • Number of children: None   • Years of education: None   • Highest education level: None   Occupational History   • None   Tobacco Use   • Smoking status: Never Smoker   • Smokeless tobacco: Never Used   Vaping Use   • Vaping Use: Never used   Substance and Sexual Activity   • Alcohol use: No   • Drug use: Yes     Types: Marijuana     Comment: used daily x 5 years  last used 2018   • Sexual activity: Yes     Partners: Male     Birth control/protection: None   Other Topics Concern   • None   Social History Narrative   • None     Social Determinants of Health     Financial Resource Strain: Low Risk    • Difficulty of Paying Living Expenses: Not hard at all   Food Insecurity: No Food Insecurity   • Worried About Running Out of Food in the Last Year: Never true   • Ran Out of Food in the Last Year: Never true   Transportation Needs: No Transportation Needs   • Lack of Transportation (Medical): No   • Lack of Transportation (Non-Medical):  No   Physical Activity: Not on file   Stress: Not on file   Social Connections: Not on file   Intimate Partner Violence: Not on file   Housing Stability: Low Risk    • Unable to Pay for Housing in the Last Year: No   • Number of Places Lived in the Last Year: 1   • Unstable Housing in the Last Year: No      Family History:     Family History   Problem Relation Age of Onset • No Known Problems Mother    • Diabetes Father    • No Known Problems Brother    • Drug abuse Brother    • No Known Problems Brother    • No Known Problems Daughter    • No Known Problems Son    • No Known Problems Son    • No Known Problems Half-Sister    • No Known Problems Half-Sister    • No Known Problems Half-Sister    • Colon cancer Neg Hx    • Breast cancer Neg Hx    • Ovarian cancer Neg Hx       Current Medications:     Current Outpatient Medications   Medication Sig Dispense Refill   • albuterol (Proventil HFA) 90 mcg/act inhaler Inhale 2 puffs every 6 (six) hours as needed for wheezing 6 7 g 5     No current facility-administered medications for this visit  Allergies: Allergies   Allergen Reactions   • Iron Dizziness      Physical Exam:     /72   Pulse 73   Temp (!) 96 9 °F (36 1 °C)   Ht 5' 2" (1 575 m)   Wt 83 kg (183 lb)   SpO2 97%   BMI 33 47 kg/m²     Physical Exam  Vitals and nursing note reviewed  Constitutional:       General: She is not in acute distress  Appearance: She is well-developed  HENT:      Head: Normocephalic and atraumatic  Eyes:      Conjunctiva/sclera: Conjunctivae normal    Cardiovascular:      Rate and Rhythm: Normal rate and regular rhythm  Heart sounds: No murmur heard  Pulmonary:      Effort: Pulmonary effort is normal  No respiratory distress  Breath sounds: Normal breath sounds  Abdominal:      General: Bowel sounds are normal       Palpations: Abdomen is soft  Tenderness: There is no abdominal tenderness  Musculoskeletal:      Cervical back: Neck supple  Skin:     General: Skin is warm and dry  Neurological:      Mental Status: She is alert            [facility]

## 2022-11-29 NOTE — MEDICAL STUDENT
Follow-up with the specialist History & Physical - OB/GYN   Radames Escudero 23 y o  female MRN: [id number]  Unit/Bed#: L&D 328-01 Encounter: [id number]    Radames Escudero is a patient of Dr Rory Lai  Chief complaint:  "I am here for a scheduled "    HPI:  Radames Escudero is a 23 y o   female with an IDA of 2018, at 39w0d weeks gestation who is being admitted for Elective  Section, Repeat  Contractions:  none  Fetal movement:  present  Vaginal bleeding:   none  Leaking of fluid:  none    Pregnancy Complications:  Previous two pregnancies delivered by  with complication of blood transfusions; GBS+; history of gestational HTN    PMH:  Past Medical History:   Diagnosis Date    Asthma    Depression    PSH:  Past Surgical History:   Procedure Laterality Date     SECTION         Social Hx:  Denies EtOH, cigarette, and recreational drug use in pregnancy; history of marijuana use - everyday for the past five years, stopped 2017 after finding out about pregnancy    OB Hx:  Obstetric History       T0      L0     SAB0   TAB0   Ectopic0   Multiple0   Live Births0       # Outcome Date GA Lbr Wai/2nd Weight Sex Delivery Anes PTL Lv   1 Current                   Meds:  No current facility-administered medications on file prior to encounter  Current Outpatient Prescriptions on File Prior to Encounter   Medication Sig Dispense Refill    acetaminophen (TYLENOL) 500 mg tablet Take 1,000 mg by mouth every 6 (six) hours as needed for mild pain      ondansetron (ZOFRAN) 4 mg tablet Take 1 tablet by mouth every 8 (eight) hours as needed for nausea or vomiting 10 tablet 0    Prenatal Vit-Fe Fumarate-FA (PRENATAL VITAMIN PLUS LOW IRON) 27-1 MG TABS Take 1 tablet by mouth daily 30 tablet 0       Allergies: Allergies   Allergen Reactions    Iron Dizziness       Review of Systems   All other systems reviewed and are negative      Physical Exam   Constitutional: She is oriented to person, place, and time  She appears well-developed and well-nourished  Cardiovascular: Normal rate and regular rhythm  Pulmonary/Chest: Effort normal    Abdominal: Soft  She exhibits no distension  There is no tenderness  Neurological: She is alert and oriented to person, place, and time        Cervix:  Closed, thick and high    Fetal heart rate:       [place]:       EFW: unknown - last previous 36w - 2917g    GBS: +    Membranes: intact    Labs:  Hemoglobin: pending labs; last previous / - 7 1  Blood type: A+  Antibody: negative  Group B strep: positive  HIV: negative  Hepatitis B: negative  RPR: nonreactive  Rubella: nonimmune  Varicella immune  1 hour Glucose: normal, 103    Assessment:   23 y o   at 39w0d presents for a scheduled  with two previous deliveries through  requiring transfusions [1u PRBC [2015]]    Plan:   Order Hgb, type and cross with two units on hold pending admit Hgb   preparation of labs, abx  Intermittently monitor baby FHT, reactivity  Rubella vaccine after delivery    D/w Dr Meme Dickens

## 2023-03-03 ENCOUNTER — VBI (OUTPATIENT)
Dept: ADMINISTRATIVE | Facility: OTHER | Age: 28
End: 2023-03-03

## 2023-05-01 ENCOUNTER — TELEPHONE (OUTPATIENT)
Dept: OBGYN CLINIC | Facility: MEDICAL CENTER | Age: 28
End: 2023-05-01

## 2023-05-01 DIAGNOSIS — Z32.01 POSITIVE PREGNANCY TEST: Primary | ICD-10-CM

## 2023-05-01 NOTE — TELEPHONE ENCOUNTER
Pt called to say that she had a positivie pregnancy test,pt said last period was about more than a month ago, pt already has type on file,labs were ordered,it was also explained to pt that she will see labs resulted before provider and to give a few days to receive a call from office

## 2023-05-02 ENCOUNTER — APPOINTMENT (OUTPATIENT)
Dept: LAB | Facility: MEDICAL CENTER | Age: 28
End: 2023-05-02

## 2023-05-02 DIAGNOSIS — Z32.01 POSITIVE PREGNANCY TEST: ICD-10-CM

## 2023-05-03 LAB
B-HCG SERPL-ACNC: ABNORMAL MIU/ML
PROGEST SERPL-MCNC: 27.1 NG/ML

## 2023-05-12 NOTE — ASSESSMENT & PLAN NOTE
4 previous c section   Went over the risk of  csection   Last op note reviewed stated minimal adhesions     Went over sterilization options

## 2023-05-12 NOTE — PROGRESS NOTES
Pregnancy Confirmation Visit  [facility] [address], [place]    Assessment/Plan:  32 y o  [id number] presenting with missed menses  Patient's last menstrual period was 2023 (exact date)  EDC - 10/10/23   @ 19w2d  Sonogram EDC  10/13/23  @ 18w 6 d      Final EDC 10/10/23    by    LMP              Problem List Items Addressed This Visit        Other    History of  - Primary     4 previous c section   Went over the risk of  csection   Last op note reviewed stated minimal adhesions     Went over sterilization options   Relevant Orders    HIV 1/2 AG/AB w Reflex [facility] for 2 yr old and above    UA (URINE) with reflex to Scope    Urine culture    Hepatitis B surface antigen    CBC and differential    Rubella antibody, IgG    Type and screen    RPR-Syphilis Screening (Total Syphilis IGG/IGM)    Hepatitis B surface antibody    Anemia Panel w/Reflex, OB    Glucose, 1H PG    Protein / creatinine ratio, urine    Comprehensive metabolic panel    History of gestational hypertension     Will need baseline labs            Relevant Orders    HIV 1/2 AG/AB w Reflex [facility] for 2 yr old and above    UA (URINE) with reflex to Scope    Urine culture    Hepatitis B surface antigen    CBC and differential    Rubella antibody, IgG    Type and screen    RPR-Syphilis Screening (Total Syphilis IGG/IGM)    Hepatitis B surface antibody    Anemia Panel w/Reflex, OB    Glucose, 1H PG    Protein / creatinine ratio, urine    Comprehensive metabolic panel    Ambulatory Referral to Maternal Fetal Medicine    History of marijuana use     Advised will need to be tested in labor and delivery    Baseline ordered in the prenatal panel              Relevant Orders    Rapid drug screen, urine    Late prenatal care affecting pregnancy in second trimester     Started care today at 19w2d  Based on LMP and consistent with biometry     Pt declines all genetic testing in the past again today "Short interval between pregnancies affecting pregnancy in second trimester, antepartum     c section x 4 was 8/2022  Went over the risk but will need to cont this discussion          Sterilization consult     Pt wishes to end her future fertility after this pregnancy and wants a salpingectomy at c section   Needs th MA 31 at 28 weeks   Pt is aware  Other Visit Diagnoses     Amenorrhea        Relevant Orders    AMB US OB 14 + weeks single or first gestation level 2 (Completed)                - Continue/start prenatal vitamin  - We reviewed her current medications and discussed which are safe to continue in pregnancy  - We briefly discussed options for aneuploidy screening, to be discussed further at the prenatal intake  - Schedule prenatal intake with RN and initial prenatal visit; prenatal labs will be ordered during the prenatal intake      Subjective:    CC: Missed period    Richelle is a 32 y o  [id number] who presents with missed menses  Patient's last menstrual period was 01/03/2023 (exact date)  Patient notes that this pregnancy was notplanned and  esired  She was not using contraception at the time of conception  She reports she is certain of her LMP and that she has regular menses  She has has some in past not today  vaginal bleeding since her LMP      Objective:  /72   Ht 5' 2\" (1 575 m)   Wt 81 kg (178 lb 9 6 oz)   LMP 01/03/2023 (Exact Date)   BMI 32 67 kg/m²     Physical Exam:  General: Well appearing, no distress  CV: Regular rate  Respiratory: Unlabored breathing  Abdomen: Soft, nontender  Extremities: Without edema  Mood and Affect: Appropriate    Transvaginal Pelvic Ultrasound  [place] IUP  Yolk sac: Present  Fetal Pole: Present  CRL consistent with EGA 10/10/23  Cardiac activity: Present    bpm  No adnexal masses appreciated                "

## 2023-05-18 ENCOUNTER — ULTRASOUND (OUTPATIENT)
Dept: OBGYN CLINIC | Facility: CLINIC | Age: 28
End: 2023-05-18

## 2023-05-18 VITALS
BODY MASS INDEX: 32.87 KG/M2 | SYSTOLIC BLOOD PRESSURE: 126 MMHG | WEIGHT: 178.6 LBS | HEIGHT: 62 IN | DIASTOLIC BLOOD PRESSURE: 72 MMHG

## 2023-05-18 DIAGNOSIS — Z30.09 STERILIZATION CONSULT: ICD-10-CM

## 2023-05-18 DIAGNOSIS — Z98.891 HISTORY OF C-SECTION: Primary | ICD-10-CM

## 2023-05-18 DIAGNOSIS — N91.2 AMENORRHEA: ICD-10-CM

## 2023-05-18 DIAGNOSIS — O09.892 SHORT INTERVAL BETWEEN PREGNANCIES AFFECTING PREGNANCY IN SECOND TRIMESTER, ANTEPARTUM: ICD-10-CM

## 2023-05-18 DIAGNOSIS — O09.32 LATE PRENATAL CARE AFFECTING PREGNANCY IN SECOND TRIMESTER: ICD-10-CM

## 2023-05-18 DIAGNOSIS — F12.91 HISTORY OF MARIJUANA USE: ICD-10-CM

## 2023-05-18 DIAGNOSIS — Z87.59 HISTORY OF GESTATIONAL HYPERTENSION: ICD-10-CM

## 2023-05-18 NOTE — ASSESSMENT & PLAN NOTE
Pt wishes to end her future fertility after this pregnancy and wants a salpingectomy at c section   Needs th MA 31 at 28 weeks   Pt is aware

## 2023-05-18 NOTE — ASSESSMENT & PLAN NOTE
Started care today at 19w2d  Based on LMP and consistent with biometry     Pt declines all genetic testing in the past again today

## 2023-05-25 ENCOUNTER — TELEPHONE (OUTPATIENT)
Dept: OBGYN CLINIC | Facility: MEDICAL CENTER | Age: 28
End: 2023-05-25

## 2023-05-25 NOTE — TELEPHONE ENCOUNTER
Called patient to reschedule the OB Intake appointment she no-showed for this morning  Unable to leave message, machine not set up  [employer] message also sent

## 2023-06-08 ENCOUNTER — TELEPHONE (OUTPATIENT)
Facility: HOSPITAL | Age: 28
End: 2023-06-08

## 2023-06-08 NOTE — TELEPHONE ENCOUNTER
Called patient to schedule MFM appointment, based on referral issued to Maternal Fetal Medicine by [facility] office  Unable to reach patient by phone  Sent message in \[facility]\"" SERVICES requesting patient to call back and schedule appointment, with office number for return call [phone]

## 2023-06-13 ENCOUNTER — TELEPHONE (OUTPATIENT)
Facility: HOSPITAL | Age: 28
End: 2023-06-13

## 2023-06-28 ENCOUNTER — TELEPHONE (OUTPATIENT)
Facility: HOSPITAL | Age: 28
End: 2023-06-28

## 2023-06-28 NOTE — TELEPHONE ENCOUNTER
Spoke w/PT today regarding scheduling a NP appt  PT was on the phone and I offered next available, 7/11, besides same day for Friday which she declined  Phone got disconnected, and attempted to call back but no answer  Left office number for PT to call back to schedule

## 2023-09-08 ENCOUNTER — ROUTINE PRENATAL (OUTPATIENT)
Dept: PERINATAL CARE | Facility: OTHER | Age: 28
End: 2023-09-08
Payer: COMMERCIAL

## 2023-09-08 VITALS
HEART RATE: 90 BPM | HEIGHT: 62 IN | BODY MASS INDEX: 33.34 KG/M2 | WEIGHT: 181.2 LBS | SYSTOLIC BLOOD PRESSURE: 130 MMHG | DIASTOLIC BLOOD PRESSURE: 72 MMHG

## 2023-09-08 DIAGNOSIS — Z36.89 ENCOUNTER FOR ULTRASOUND TO CHECK FETAL GROWTH: ICD-10-CM

## 2023-09-08 DIAGNOSIS — Z36.3 ENCOUNTER FOR ANTENATAL SCREENING FOR MALFORMATION: ICD-10-CM

## 2023-09-08 DIAGNOSIS — Z98.891 HISTORY OF C-SECTION: ICD-10-CM

## 2023-09-08 DIAGNOSIS — Z3A.35 35 WEEKS GESTATION OF PREGNANCY: ICD-10-CM

## 2023-09-08 DIAGNOSIS — Z87.59 HISTORY OF GESTATIONAL HYPERTENSION: ICD-10-CM

## 2023-09-08 DIAGNOSIS — O09.892 SHORT INTERVAL BETWEEN PREGNANCIES AFFECTING PREGNANCY IN SECOND TRIMESTER, ANTEPARTUM: Primary | ICD-10-CM

## 2023-09-08 PROCEDURE — 76805 OB US >/= 14 WKS SNGL FETUS: CPT | Performed by: OBSTETRICS & GYNECOLOGY

## 2023-09-08 NOTE — PROGRESS NOTES
[facility]: 1 Fabrice Cleaning was seen today for anatomic survey ultrasound. See ultrasound report under "OB Procedures" tab. The time spent on this established patient on the encounter date included 8 minutes previsit service time reviewing records and precharting, 15 minutes face-to-face service time counseling regarding results and coordinating care, and  5 minutes charting, totalling 28 minutes.   Please don't hesitate to contact our office with any concerns or questions.  -Radha Gallegos MD

## 2023-09-08 NOTE — PATIENT INSTRUCTIONS
Thank you for choosing us for your  care today. If you have any questions about your ultrasound or care, please do not hesitate to contact us or your primary obstetrician. Some general instructions for your pregnancy are:    Protect against coronavirus: get vaccinated - pregnant women are increased risk of severe COVID. Notify your primary care doctor if you have any symptoms. Exercise: Aim for 22 minutes per day (150 minutes per week) of regular exercise. Walking is great! Nutrition: aim for calcium-rich and iron-rich foods as well as healthy sources of protein. Learn about Preeclampsia: preeclampsia is a common, serious high blood pressure complication in pregnancy. A blood pressure of 163AWCQ (systolic or top number) or 32QHVF (diastolic or bottom number) is not normal and needs evaluation by your doctor. Aspirin is sometimes prescribed in early pregnancy to prevent preeclampsia in women with risk factors - ask your obstetrician if you should be on this medication. If you smoke, try to reduce how many cigarettes you smoke or try to quit completely. Do not vape. Other warning signs to watch out for in pregnancy or postpartum: chest pain, obstructed breathing or shortness of breath, seizures, thoughts of hurting yourself or your baby, bleeding, a painful or swollen leg, fever, or headache (see [facility] POST-BIRTH Warning Signs campaign). If these happen call 911. Itching is also not normal in pregnancy and if you experience this, especially over your hands and feet, potentially worse at night, notify your doctors.

## 2023-09-08 NOTE — LETTER
2023    Ardena Eisenmenger, MD   [facility]    Patient: Prince Roca   YOB: 1995   Date of Visit: 2023   Gestational age 27w4d   Neno Heal of this communication: Urgent: Patient has had no prenatal care since May, no prenatal labs. Showed up for 35 week detailed scan (which had been rescheduled 3 times) Ultrasound does not have any abnormalities but she is a high order repeat (C/s x 4) previousyl  requiring transfusion. Encouraged to get labs and CBC but did not go to lab after visit with us. Encouraged to schedule follow up with your office. Dear Dr Leodan Flaherty,    This patient was seen recently in our  office. The content of my evaluation today is in the ultrasound report under "OB Procedures" tab. Please don't hesitate to contact our office with any concerns or questions.      Sincerely,      Rocco Singleton MD  Attending Physician, [address] [place] [address]

## 2023-09-11 ENCOUNTER — TELEPHONE (OUTPATIENT)
Dept: OBGYN CLINIC | Facility: MEDICAL CENTER | Age: 28
End: 2023-09-11

## 2023-09-11 PROBLEM — O09.33 LATE PRENATAL CARE AFFECTING PREGNANCY IN THIRD TRIMESTER: Status: ACTIVE | Noted: 2023-05-18

## 2023-09-11 PROBLEM — O09.893 SHORT INTERVAL BETWEEN PREGNANCIES COMPLICATING PREGNANCY, ANTEPARTUM, THIRD TRIMESTER: Status: ACTIVE | Noted: 2023-05-18

## 2023-09-11 NOTE — TELEPHONE ENCOUNTER
Pt called. Currently is 35w6d. She was seen on 05/18/23 for an US. Didn't show up for her Intake on 05/25/23. Office tried to contact her by phone and My Chart to reschedule Pt  saw the message on Vitals ([email or web address])hart one month later but didn't call back. Pt is scheduled for:  09/12 for an Intake in [facility]  09/12 for a Initial OB in [facility]. 09/20 for a OB visit with 37 weeks with Rosie. 09/26 for a OB visit with 38 weeks with Eduin. Pt was advice that in order for us to be able to continue giving care to her, she needs to show up for her appointments. Pt is aware of the times and locations of each appt. Pt agreed and said she will show up for the appointments that were scheduled.

## 2023-09-11 NOTE — TELEPHONE ENCOUNTER
Contacted patient - advised labs were ordered for her to complete on 05/18. Advised patient to get them completed prior to appointment tomorrow. Patient agreeable - said she would go to the lab down the street from her house this afternoon.

## 2023-09-13 ENCOUNTER — TELEPHONE (OUTPATIENT)
Dept: OBGYN CLINIC | Facility: MEDICAL CENTER | Age: 28
End: 2023-09-13

## 2023-09-13 NOTE — TELEPHONE ENCOUNTER
Spoke to pt. She understands why we cannot continue care with her. Pt was given the information for a near by office to call and start care with them. Patient stated she cannot deliver in [facility]. Asked patient to call that office to transfer her care. Her insurance has a program called [employer] ([phone]) that offers her transportation and other benefits. I called the pt back and left her a voice message with their phone number. PT was last seen on 5/18/23 for an [address] with Dr. Sonia Wei. She did not show for her intake. We tried to contact her and had to send a [employer] message that the patient saw a month later. She called on 9/11/23 and was given an appointment for an intake next day on 9/12/23, that she missed. Pt was understanding and agreeable in transferring care to an office closer to her home.

## 2023-09-13 NOTE — TELEPHONE ENCOUNTER
Pt's mom called office. Stated wanting to speak with a Manager, advice Manager was not in the office. Also advice that she was not in patient's Communication Consent in order for us to discuss regarding pt's records. Pt was schedule for her Intake, Initial OB, and Ob visits on 09/11 and stated she could make it to those times and dates. Didn't show up for her appointments on 09/12. Pt was advice that in order for us to be able to continue with her care, she was needing to come to those appointments. Pt's mom stated if we cannot see her daughter on Friday's appoinments, she will decide to speak with the Authorities.

## 2023-09-13 NOTE — TELEPHONE ENCOUNTER
Called pt again and was able to provide her with the [employer]'s phone number from her insurance company. Advised patient of her mother's call yesterday (9/12/23) and today (9/13/23). Pt states she didn't even know her mother was calling us regarding her care or appointments. Advised her to please let her mother know we cannot speak to her unless she (the patient) adds her to the communication consent, gives us permission, or does a 3 way with her mother, with us on the phone. Pt stated "my mother doesn't even live with me" and proceeded to hang up the phone.

## 2023-09-19 ENCOUNTER — ROUTINE PRENATAL (OUTPATIENT)
Dept: OBGYN CLINIC | Facility: CLINIC | Age: 28
End: 2023-09-19

## 2023-09-19 ENCOUNTER — PATIENT OUTREACH (OUTPATIENT)
Dept: OBGYN CLINIC | Facility: CLINIC | Age: 28
End: 2023-09-19

## 2023-09-19 VITALS
HEIGHT: 62 IN | DIASTOLIC BLOOD PRESSURE: 71 MMHG | RESPIRATION RATE: 18 BRPM | HEART RATE: 79 BPM | BODY MASS INDEX: 34.34 KG/M2 | SYSTOLIC BLOOD PRESSURE: 115 MMHG | WEIGHT: 186.6 LBS

## 2023-09-19 DIAGNOSIS — Z3A.37 37 WEEKS GESTATION OF PREGNANCY: ICD-10-CM

## 2023-09-19 DIAGNOSIS — D64.9 ANEMIA, UNSPECIFIED TYPE: ICD-10-CM

## 2023-09-19 DIAGNOSIS — Z87.59 HISTORY OF GESTATIONAL HYPERTENSION: ICD-10-CM

## 2023-09-19 DIAGNOSIS — Z98.891 HISTORY OF C-SECTION: ICD-10-CM

## 2023-09-19 DIAGNOSIS — Z59.82 INABILITY TO ACQUIRE TRANSPORTATION: ICD-10-CM

## 2023-09-19 DIAGNOSIS — O09.33 LATE PRENATAL CARE AFFECTING PREGNANCY IN THIRD TRIMESTER: Primary | ICD-10-CM

## 2023-09-19 DIAGNOSIS — Z23 NEED FOR DIPHTHERIA-TETANUS-PERTUSSIS (TDAP) VACCINE: ICD-10-CM

## 2023-09-19 PROCEDURE — 90715 TDAP VACCINE 7 YRS/> IM: CPT | Performed by: OBSTETRICS & GYNECOLOGY

## 2023-09-19 PROCEDURE — 87591 N.GONORRHOEAE DNA AMP PROB: CPT | Performed by: OBSTETRICS & GYNECOLOGY

## 2023-09-19 PROCEDURE — 87150 DNA/RNA AMPLIFIED PROBE: CPT | Performed by: OBSTETRICS & GYNECOLOGY

## 2023-09-19 PROCEDURE — 99203 OFFICE O/P NEW LOW 30 MIN: CPT | Performed by: OBSTETRICS & GYNECOLOGY

## 2023-09-19 PROCEDURE — G0124 SCREEN C/V THIN LAYER BY MD: HCPCS | Performed by: STUDENT IN AN ORGANIZED HEALTH CARE EDUCATION/TRAINING PROGRAM

## 2023-09-19 PROCEDURE — G0145 SCR C/V CYTO,THINLAYER,RESCR: HCPCS | Performed by: STUDENT IN AN ORGANIZED HEALTH CARE EDUCATION/TRAINING PROGRAM

## 2023-09-19 PROCEDURE — G0476 HPV COMBO ASSAY CA SCREEN: HCPCS | Performed by: OBSTETRICS & GYNECOLOGY

## 2023-09-19 PROCEDURE — 87491 CHLMYD TRACH DNA AMP PROBE: CPT | Performed by: OBSTETRICS & GYNECOLOGY

## 2023-09-19 PROCEDURE — 90471 IMMUNIZATION ADMIN: CPT | Performed by: OBSTETRICS & GYNECOLOGY

## 2023-09-19 SDOH — ECONOMIC STABILITY - TRANSPORTATION SECURITY: TRANSPORTATION INSECURITY: Z59.82

## 2023-09-19 NOTE — ASSESSMENT & PLAN NOTE
Prenatal labs not yet completed   She is dated by an US at 19w   Encouraged her to have her labs done as soon as possible. Her partner is her only means of transportation and will try and help her with transportation in whatever way we can. Urine, GBS and pap collected today.    RTO in 1 week

## 2023-09-19 NOTE — ASSESSMENT & PLAN NOTE
Patient reports limited prenatal care secondary to transportation issues. Will have [employer] see her and discuss options.

## 2023-09-19 NOTE — PROGRESS NOTES
OB/GYN  PRENATAL H&P VISIT  Pricila Hauser  2023  5:21 PM  Dr. Sandie Barrientos MD      SUBJECTIVE    Patient is a [id number] at 37w0d here for initial prenatal H&P. She is a late transfer of care and has compliant issues with getting to appointments. She reports that she lives in [facility] and her fiancee typically can take her to appointments but he also works during the day and cannot always take her. This has been her barrier to care. Today she reports feeling well. She has been feeling the baby moving, denies bleeding, contractions or loss of fluid. She lives at home with her firishie and 4 children and feels safe at home. She is not currently working. She denies tobacco, drug or alcohol use. She denies history of STI. Her pregnancy history:     G1: CS at 40w - required blood transfusion, gHTN, anemia   G2: CS at 39w- required blood transfusion, gHTN, anemia   G3: CS at 39w- anemia   G4: CS at 39w- anemia     This will be her 5th . She had a recent scan with maternal fetal medicine that showed EFW 2729g 55%ile. She has not yet completed prenatal labs. She denies any other medical problems or surgeries. OB History    Para Term  AB Living   5 4 4 0 0 4   SAB IAB Ectopic Multiple Live Births   0 0 0 0 4      # Outcome Date GA Lbr Wai/2nd Weight Sex Delivery Anes PTL Lv   5 Current            4 Term 22 39w1d  3920 g (8 lb 10.3 oz) M    GENEVA      Name: Sidra Salazar ([address] )      Apgar1: 8  Apgar5: 9   3 Term 18 39w1d  3204 g (7 lb 1 oz) F CS-LTranv Spinal N GENEVA      Name: Dmitry De Jesus  ([address] )      Apgar1: 8  Apgar5: 9   2 Term 01/27/15 39w0d  4366 g (9 lb 10 oz) M CS-LTranv Spinal N GENEVA      Complications:  Other Excessive Bleeding, Gestational hypertension   1 Term 14 40w0d   M CS-LTranv Spinal N GENEAV      Complications: Fetal Intolerance, Other Excessive Bleeding, Gestational hypertension       Review of Systems   Constitutional: Negative for chills and fever. Eyes: Negative for visual disturbance. Respiratory: Negative for shortness of breath. Gastrointestinal: Negative for abdominal pain, nausea and vomiting. Genitourinary: Negative for vaginal bleeding and vaginal discharge. Musculoskeletal: Negative. Neurological: Negative. Past Medical History:   Diagnosis Date   • Asthma        Past Surgical History:   Procedure Laterality Date   •  SECTION     • PA  DELIVERY ONLY N/A 2018    Procedure:  SECTION () REPEAT;  Surgeon: Kayla Singh DO;  Location: [facility];  Service: Obstetrics   • PA  DELIVERY ONLY N/A 2022    Procedure: Melony Wilson () REPEAT;  Surgeon: Nathan Vines MD;  Location: [facility];  Service: Obstetrics       Social History     Socioeconomic History   • Marital status:      Spouse name: Not on file   • Number of children: Not on file   • Years of education: Not on file   • Highest education level: Not on file   Occupational History   • Not on file   Tobacco Use   • Smoking status: Never   • Smokeless tobacco: Never   Vaping Use   • Vaping Use: Never used   Substance and Sexual Activity   • Alcohol use: No   • Drug use: Yes     Types: Marijuana     Comment: used daily x 5 years. last used 2018   • Sexual activity: Yes     Partners: Male     Birth control/protection: None   Other Topics Concern   • Not on file   Social History Narrative   • Not on file     Social Determinants of Health     Financial Resource Strain: Low Risk  (2023)    Overall Financial Resource Strain (CARDIA)    • Difficulty of Paying Living Expenses: Not very hard   Food Insecurity: No Food Insecurity (2023)    Hunger Vital Sign    • Worried About Running Out of Food in the Last Year: Never true    • Ran Out of Food in the Last Year: Never true   Transportation Needs: Unmet Transportation Needs (2023)    PRAPARE - Transportation    • Lack of Transportation (Medical):  Yes • Lack of Transportation (Non-Medical): Yes   Physical Activity: Not on file   Stress: Not on file   Social Connections: Not on file   Intimate Partner Violence: Not on file   Housing Stability: [address]  (11/3/2021)    Housing Stability Vital Sign    • Unable to Pay for Housing in the Last Year: No    • Number of Places Lived in the Last Year: 1    • Unstable Housing in the Last Year: No       OBJECTIVE  Vitals:    23 1409   BP: 115/71   Pulse: 79   Resp: 18     Physical Exam  Constitutional:       Appearance: Normal appearance. Genitourinary:      Genitourinary Comments: Normal external female genitalia   No lesions or skin changes noted on the vulva, perineum or perianal region   On speculum exam, there vaginal mucosa is well estrogenized. No abnormal discharge, lesions or bleeding   Cervix is visualized and grossly normal in appearance. No bleeding or abnormal discharge noted. Pap collected   GBS collected    Cardiovascular:      Rate and Rhythm: Normal rate. Pulmonary:      Effort: Pulmonary effort is normal.   Abdominal:      Palpations: Abdomen is soft. Tenderness: There is no abdominal tenderness. There is no guarding or rebound. Comments: Gravid   Cephalic by JA   FHR [id number] by JA    Musculoskeletal:      Right lower leg: No edema. Left lower leg: No edema. Neurological:      General: No focal deficit present. Mental Status: She is alert. Mental status is at baseline. Psychiatric:         Mood and Affect: Mood normal.          /71 (BP Location: Left arm, Patient Position: Sitting, Cuff Size: Large)   Pulse 79   Resp 18   Ht 5' 2" (1.575 m)   Wt 84.6 kg (186 lb 9.6 oz)   LMP 2023 (Exact Date)      ASSESSMENT AND PLAN  Thania Sánchez is a 26 y/o  at 37w0d here to establish prenatal care     Problem List Items Addressed This Visit        Other    Anemia     History of significant anemia in prior pregnancies.    Hgb electrophoresis completed in 2018.   No baseline CBC this pregnancy   Discussed with her the likelihood that her hgb is still low and her risk of requiring transfusion again. She understands and is amenable to transfusion if necessary          History of      History of 4 prior  deliveries. This will be her fifth. We discussed the risks with multiple  deliveries including hemorrhage, risks of scar tissue, PAS. Her placenta is anterior, no previa noted. Has been previously counseled about PAS risk by MFM. Last 2 op notes are available and do not describe significant scar tissue, however, we won't know the status of her scar tissue until the time of delivery. Counseled patient on risks of  delivery including- pain, infection, VTE, injury to surrounding structures including- bowel, bladder, blood vessels and nerves. Additionally, we discussed bleeding risks and management of bleeding including-ELIE, medications. If she experiences severe hemorrhage that is life threatening, then a hysterectomy may be warranted to save her life. She verbalizes understanding of this risk and accepts hysterectomy if indicated. She is amenable to transfusion of blood products if indicated. She also desires all life saving procedures to be performed if necessary. Additionally, we discussed contraception. She thought she may want sterilization but now is not sure what she would like to do. We discussed alternatives including LARC. She is not interested in a LARC and would like OCPs. Counseled patient on the maternal and fetal risks associated with higher order cesareans. We would not advise her to become pregnant again. She voiced understanding and feels she could be the most compliant with OCPs. Will plan for  at 39w. She desires delivery in [facility]. CS scheduled for 10/4 with Dr. Dejuan Cruz who was able to meet this patient today.           History of gestational hypertension     Normotensive today- pree labs already ordered            Late prenatal care affecting pregnancy in third trimester - Primary     Patient reports limited prenatal care secondary to transportation issues. Will have Feliberto Almazan see her and discuss options. Relevant Orders    Ambulatory referral to social work care management program    Liquid-based pap, screening    Chlamydia/GC amplified DNA by PCR    Strep B DNA probe, amplification    37 weeks gestation of pregnancy     Prenatal labs not yet completed   She is dated by an US at [address]   Encouraged her to have her labs done as soon as possible. Her partner is her only means of transportation and will try and help her with transportation in whatever way we can. Urine, GBS and pap collected today.    RTO in 1 week         Other Visit Diagnoses     Inability to acquire transportation        Relevant Orders    Ambulatory referral to social work care management program    Need for diphtheria-tetanus-pertussis (Tdap) vaccine        Relevant Orders    Tdap Vaccine greater than or equal to 6yo (Completed)            Jared Anthony MD  9/19/2023  5:21 PM

## 2023-09-19 NOTE — ASSESSMENT & PLAN NOTE
History of 4 prior  deliveries. This will be her fifth. We discussed the risks with multiple  deliveries including hemorrhage, risks of scar tissue, PAS. Her placenta is anterior, no previa noted. Has been previously counseled about PAS risk by MFM. Last 2 op notes are available and do not describe significant scar tissue, however, we won't know the status of her scar tissue until the time of delivery. Counseled patient on risks of  delivery including- pain, infection, VTE, injury to surrounding structures including- bowel, bladder, blood vessels and nerves. Additionally, we discussed bleeding risks and management of bleeding including-ELIE, medications. If she experiences severe hemorrhage that is life threatening, then a hysterectomy may be warranted to save her life. She verbalizes understanding of this risk and accepts hysterectomy if indicated. She is amenable to transfusion of blood products if indicated. She also desires all life saving procedures to be performed if necessary. Additionally, we discussed contraception. She thought she may want sterilization but now is not sure what she would like to do. We discussed alternatives including LARC. She is not interested in a LARC and would like OCPs. Counseled patient on the maternal and fetal risks associated with higher order cesareans. We would not advise her to become pregnant again. She voiced understanding and feels she could be the most compliant with OCPs. Will plan for  at 39w. She desires delivery in [facility]. CS scheduled for 10/4 with Dr. Sylvia Cabrera who was able to meet this patient today.

## 2023-09-19 NOTE — PROGRESS NOTES
28 week education packet provided to patient on 09/19/23. Included in packet:   Third Trimester paperwork  Delivery consent   Birthing room support person rules and acknowledgment  Birth Plan   Welcome information  Birth certificate worksheet   Consent for Photographers  Perineal/ Vaginal massage   Pediatric practices and locations   Tdap given today  Pt will bottle feed

## 2023-09-19 NOTE — ASSESSMENT & PLAN NOTE
History of significant anemia in prior pregnancies. Hgb electrophoresis completed in 2018. No baseline CBC this pregnancy   Discussed with her the likelihood that her hgb is still low and her risk of requiring transfusion again.  She understands and is amenable to transfusion if necessary

## 2023-09-20 ENCOUNTER — PATIENT OUTREACH (OUTPATIENT)
Dept: OBGYN CLINIC | Facility: CLINIC | Age: 28
End: 2023-09-20

## 2023-09-20 LAB
HPV HR 12 DNA CVX QL NAA+PROBE: POSITIVE
HPV16 DNA CVX QL NAA+PROBE: NEGATIVE
HPV18 DNA CVX QL NAA+PROBE: NEGATIVE
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
## 2023-09-21 LAB
C TRACH DNA SPEC QL NAA+PROBE: NEGATIVE
GP B STREP DNA SPEC QL NAA+PROBE: POSITIVE
N GONORRHOEA DNA SPEC QL NAA+PROBE: NEGATIVE

## 2023-09-25 ENCOUNTER — TELEPHONE (OUTPATIENT)
Dept: OBGYN CLINIC | Facility: CLINIC | Age: 28
End: 2023-09-25

## 2023-09-25 LAB
LAB AP GYN PRIMARY INTERPRETATION: ABNORMAL
Lab: ABNORMAL
PATH INTERP SPEC-IMP: ABNORMAL

## 2023-09-25 NOTE — TELEPHONE ENCOUNTER
----- Message from Domingo Wang MD sent at 9/25/2023 10:43 AM EDT -----  Please call patient and let her know gonorrhea and chlamydia screening was negative. She is GBS positive.

## 2023-09-29 ENCOUNTER — ROUTINE PRENATAL (OUTPATIENT)
Dept: OBGYN CLINIC | Facility: CLINIC | Age: 28
End: 2023-09-29

## 2023-09-29 ENCOUNTER — TELEPHONE (OUTPATIENT)
Dept: OTHER | Facility: HOSPITAL | Age: 28
End: 2023-09-29

## 2023-09-29 VITALS
SYSTOLIC BLOOD PRESSURE: 126 MMHG | BODY MASS INDEX: 35.41 KG/M2 | DIASTOLIC BLOOD PRESSURE: 84 MMHG | HEIGHT: 62 IN | HEART RATE: 82 BPM | WEIGHT: 192.4 LBS

## 2023-09-29 DIAGNOSIS — D64.9 ANEMIA, UNSPECIFIED TYPE: ICD-10-CM

## 2023-09-29 DIAGNOSIS — Z3A.38 38 WEEKS GESTATION OF PREGNANCY: Primary | ICD-10-CM

## 2023-09-29 DIAGNOSIS — O09.33 LATE PRENATAL CARE AFFECTING PREGNANCY IN THIRD TRIMESTER: ICD-10-CM

## 2023-09-29 DIAGNOSIS — O09.893 SHORT INTERVAL BETWEEN PREGNANCIES COMPLICATING PREGNANCY, ANTEPARTUM, THIRD TRIMESTER: ICD-10-CM

## 2023-09-29 PROCEDURE — 99213 OFFICE O/P EST LOW 20 MIN: CPT | Performed by: OBSTETRICS & GYNECOLOGY

## 2023-09-29 NOTE — PROGRESS NOTES
[address] [facility]  [id number]  1995        ASSESSMENT/PLAN:  Problem List        Respiratory    Mild persistent asthma without complication       Other        Anemia    Current Assessment & Plan     History of significant anemia in prior pregnancies   No baseline CBC this pregnancy, was previously ordered. Encouraged patient to complete. Amenable to transfusion if necessary. History of     History of gestational hypertension    History of marijuana use    Late prenatal care affecting pregnancy in third trimester    Short interval between pregnancies complicating pregnancy, antepartum, third trimester    Sterilization consult    38 weeks gestation of pregnancy    Current Assessment & Plan     · Prenatal labs not completed secondary to lack of transportation. Discussed with patient importance of completing labs and encouraged patient to complete labs today. · She is dated by US at 19w  · GC/CT urine negative   · GBS positive   · Pap 23: ASCUS, HPV positive. Will need colposcopy PP. · Ultrasound 23: Vertex, anterior placenta, EFW 2729 g(55% )  · Contraception: was considering salpingectomy at time of  but changed her mind. Patient also considered LARC but  would now prefer OCP's   · RCS scheduled for 10/4/23. Patient to follow up 1 week PP for incision check                 Subjective: 25 y.o.  38w3d here for prenatal visit. She endorses intermittent contractions. She denies leakage of fluid and vaginal bleeding. She endorses good fetal movement. No questions or concerns at this time.      Objective:  Pre- Vitals    Flowsheet Row Most Recent Value   Prenatal Assessment    Fetal Heart Rate 147   Fundal Height (cm) 39 cm   Prenatal Vitals    Blood Pressure 126/84   Weight - Scale 87.3 kg (192 lb 6.4 oz)   Urine Albumin/Glucose    Dilation/Effacement/Station    Vaginal Drainage    Edema            Pregnancy Plan:  Pregnancy: Cabrera   RLTCS   OCP's contraception     FHT: 147 pm   Fundal Height: 39 cm     General: Well appearing, no distress  Respiratory: Unlabored breathing, lungs clear to ascultation   Cardiovascular: Regular rate. No murmur   Abdomen: Soft, gravid, nontender  Fundal Height: Appropriate for gestational age. Extremities: Warm and well perfused. Non tender.     D/w Dr. Jannie Frank MD  PGY2 Family Medicine

## 2023-09-29 NOTE — ASSESSMENT & PLAN NOTE
· Prenatal labs not completed secondary to lack of transportation. Discussed with patient importance of completing labs and encouraged patient to complete labs today. · She is dated by US at 19w  · GC/CT urine negative   · GBS positive   · Pap 23: ASCUS, HPV positive. Will need colposcopy PP. · Ultrasound 23: Vertex, anterior placenta, EFW 2729 g(55% )  · Contraception: was considering salpingectomy at time of  but changed her mind. Patient also considered LARC but  would now prefer OCP's   · RCS scheduled for 10/4/23.  Patient to follow up 1 week PP for incision check

## 2023-09-29 NOTE — ASSESSMENT & PLAN NOTE
History of significant anemia in prior pregnancies   No baseline CBC this pregnancy, was previously ordered. Encouraged patient to complete. Amenable to transfusion if necessary.

## 2023-10-02 ENCOUNTER — TELEPHONE (OUTPATIENT)
Dept: OBGYN CLINIC | Facility: CLINIC | Age: 28
End: 2023-10-02

## 2023-10-02 NOTE — TELEPHONE ENCOUNTER
----- Message from Jenn Fagan MD sent at 9/29/2023  3:46 PM EDT -----  Please reach out to [address] and let her know that her pap smear showed abnormal cells on you cervix and human papilloma virus (HPV). She will need a colpo likely postpartum. I will try her again early next week.

## 2023-10-03 ENCOUNTER — ANESTHESIA EVENT (INPATIENT)
Dept: LABOR AND DELIVERY | Facility: HOSPITAL | Age: 28
End: 2023-10-03
Payer: COMMERCIAL

## 2023-10-04 ENCOUNTER — ANESTHESIA (INPATIENT)
Dept: LABOR AND DELIVERY | Facility: HOSPITAL | Age: 28
End: 2023-10-04
Payer: COMMERCIAL

## 2023-10-04 ENCOUNTER — HOSPITAL ENCOUNTER (INPATIENT)
Facility: HOSPITAL | Age: 28
LOS: 2 days | Discharge: HOME | End: 2023-10-06
Attending: OBSTETRICS & GYNECOLOGY | Admitting: OBSTETRICS & GYNECOLOGY
Payer: COMMERCIAL

## 2023-10-04 DIAGNOSIS — O09.33 LATE PRENATAL CARE AFFECTING PREGNANCY IN THIRD TRIMESTER: ICD-10-CM

## 2023-10-04 DIAGNOSIS — Z3A.38 38 WEEKS GESTATION OF PREGNANCY: ICD-10-CM

## 2023-10-04 DIAGNOSIS — Z98.891 STATUS POST REPEAT LOW TRANSVERSE CESAREAN SECTION: Primary | ICD-10-CM

## 2023-10-04 DIAGNOSIS — Z98.891 HISTORY OF C-SECTION: ICD-10-CM

## 2023-10-04 PROBLEM — R87.810 ASCUS WITH POSITIVE HIGH RISK HPV CERVICAL: Status: ACTIVE | Noted: 2023-10-04

## 2023-10-04 PROBLEM — Z3A.39 39 WEEKS GESTATION OF PREGNANCY: Status: ACTIVE | Noted: 2023-09-19

## 2023-10-04 PROBLEM — R87.610 ASCUS WITH POSITIVE HIGH RISK HPV CERVICAL: Status: ACTIVE | Noted: 2023-10-04

## 2023-10-04 LAB
ABO GROUP BLD: NORMAL
ALBUMIN SERPL BCP-MCNC: 3.4 G/DL (ref 3.5–5)
ALP SERPL-CCNC: 150 U/L (ref 34–104)
ALT SERPL W P-5'-P-CCNC: 7 U/L (ref 7–52)
AMPHETAMINES SERPL QL SCN: NEGATIVE
ANION GAP SERPL CALCULATED.3IONS-SCNC: 9 MMOL/L
AST SERPL W P-5'-P-CCNC: 11 U/L (ref 13–39)
BARBITURATES UR QL: NEGATIVE
BASE EXCESS BLDCOV CALC-SCNC: -2 MMOL/L (ref 1–9)
BASOPHILS # BLD AUTO: 0.02 THOUSANDS/ÂΜL (ref 0–0.1)
BASOPHILS NFR BLD AUTO: 0 % (ref 0–1)
BENZODIAZ UR QL: NEGATIVE
BILIRUB SERPL-MCNC: 0.41 MG/DL (ref 0.2–1)
BLD GP AB SCN SERPL QL: NEGATIVE
BUN SERPL-MCNC: 7 MG/DL (ref 5–25)
CALCIUM ALBUM COR SERPL-MCNC: 8.9 MG/DL (ref 8.3–10.1)
CALCIUM SERPL-MCNC: 8.4 MG/DL (ref 8.4–10.2)
CHLORIDE SERPL-SCNC: 106 MMOL/L (ref 96–108)
CO2 SERPL-SCNC: 20 MMOL/L (ref 21–32)
COCAINE UR QL: NEGATIVE
CREAT SERPL-MCNC: 0.37 MG/DL (ref 0.6–1.3)
CREAT UR-MCNC: 129.2 MG/DL
EOSINOPHIL # BLD AUTO: 0.11 THOUSAND/ÂΜL (ref 0–0.61)
EOSINOPHIL NFR BLD AUTO: 1 % (ref 0–6)
ERYTHROCYTE [DISTWIDTH] IN BLOOD BY AUTOMATED COUNT: 14.7 % (ref 11.6–15.1)
GFR SERPL CREATININE-BSD FRML MDRD: 143 ML/MIN/1.73SQ M
GLUCOSE SERPL-MCNC: 68 MG/DL (ref 65–140)
GLUCOSE SERPL-MCNC: 68 MG/DL (ref 65–140)
HCO3 BLDCOV-SCNC: 25.4 MMOL/L (ref 12.2–28.6)
HCT VFR BLD AUTO: 33.7 % (ref 34.8–46.1)
HGB BLD-MCNC: 10.6 G/DL (ref 11.5–15.4)
HIV 1+2 AB+HIV1 P24 AG SERPL QL IA: NORMAL
HIV1 P24 AG SER QL: NORMAL
IMM GRANULOCYTES # BLD AUTO: 0.04 THOUSAND/UL (ref 0–0.2)
IMM GRANULOCYTES NFR BLD AUTO: 1 % (ref 0–2)
LYMPHOCYTES # BLD AUTO: 1.89 THOUSANDS/ÂΜL (ref 0.6–4.47)
LYMPHOCYTES NFR BLD AUTO: 23 % (ref 14–44)
MCH RBC QN AUTO: 25.4 PG (ref 26.8–34.3)
MCHC RBC AUTO-ENTMCNC: 31.5 G/DL (ref 31.4–37.4)
MCV RBC AUTO: 81 FL (ref 82–98)
MONOCYTES # BLD AUTO: 0.53 THOUSAND/ÂΜL (ref 0.17–1.22)
MONOCYTES NFR BLD AUTO: 6 % (ref 4–12)
NEUTROPHILS # BLD AUTO: 5.78 THOUSANDS/ÂΜL (ref 1.85–7.62)
NEUTS SEG NFR BLD AUTO: 69 % (ref 43–75)
NRBC BLD AUTO-RTO: 0 /100 WBCS
OPIATES UR QL SCN: NEGATIVE
OXYCODONE+OXYMORPHONE UR QL SCN: NEGATIVE
OXYHGB MFR BLDCOV: 25 %
PCO2 BLDCOV: 52.3 MM HG (ref 27–43)
PCP UR QL: NEGATIVE
PH BLDCOV: 7.3 [PH] (ref 7.19–7.49)
PLATELET # BLD AUTO: 201 THOUSANDS/UL (ref 149–390)
PMV BLD AUTO: 12.3 FL (ref 8.9–12.7)
PO2 BLDCOV: 14.7 MM HG (ref 15–45)
POTASSIUM SERPL-SCNC: 3.5 MMOL/L (ref 3.5–5.3)
PROT SERPL-MCNC: 6.4 G/DL (ref 6.4–8.4)
PROT UR-MCNC: 20 MG/DL
PROT/CREAT UR: 0.15 MG/G{CREAT} (ref 0–0.1)
RBC # BLD AUTO: 4.17 MILLION/UL (ref 3.81–5.12)
RH BLD: POSITIVE
RUBV IGG SERPL IA-ACNC: 15 IU/ML
SAO2 % BLDCOV: 6.5 ML/DL
SODIUM SERPL-SCNC: 135 MMOL/L (ref 135–147)
SPECIMEN EXPIRATION DATE: NORMAL
THC UR QL: POSITIVE
WBC # BLD AUTO: 8.37 THOUSAND/UL (ref 4.31–10.16)

## 2023-10-04 PROCEDURE — 86803 HEPATITIS C AB TEST: CPT | Performed by: OBSTETRICS & GYNECOLOGY

## 2023-10-04 PROCEDURE — NC001 PR NO CHARGE: Performed by: OBSTETRICS & GYNECOLOGY

## 2023-10-04 PROCEDURE — 4A1HXCZ MONITORING OF PRODUCTS OF CONCEPTION, CARDIAC RATE, EXTERNAL APPROACH: ICD-10-PCS | Performed by: OBSTETRICS & GYNECOLOGY

## 2023-10-04 PROCEDURE — 86762 RUBELLA ANTIBODY: CPT | Performed by: OBSTETRICS & GYNECOLOGY

## 2023-10-04 PROCEDURE — 86850 RBC ANTIBODY SCREEN: CPT | Performed by: OBSTETRICS & GYNECOLOGY

## 2023-10-04 PROCEDURE — 86900 BLOOD TYPING SEROLOGIC ABO: CPT | Performed by: OBSTETRICS & GYNECOLOGY

## 2023-10-04 PROCEDURE — 82570 ASSAY OF URINE CREATININE: CPT | Performed by: OBSTETRICS & GYNECOLOGY

## 2023-10-04 PROCEDURE — 86780 TREPONEMA PALLIDUM: CPT | Performed by: OBSTETRICS & GYNECOLOGY

## 2023-10-04 PROCEDURE — 82948 REAGENT STRIP/BLOOD GLUCOSE: CPT

## 2023-10-04 PROCEDURE — 87077 CULTURE AEROBIC IDENTIFY: CPT | Performed by: OBSTETRICS & GYNECOLOGY

## 2023-10-04 PROCEDURE — 87389 HIV-1 AG W/HIV-1&-2 AB AG IA: CPT | Performed by: OBSTETRICS & GYNECOLOGY

## 2023-10-04 PROCEDURE — 87340 HEPATITIS B SURFACE AG IA: CPT | Performed by: OBSTETRICS & GYNECOLOGY

## 2023-10-04 PROCEDURE — 85025 COMPLETE CBC W/AUTO DIFF WBC: CPT | Performed by: OBSTETRICS & GYNECOLOGY

## 2023-10-04 PROCEDURE — 86923 COMPATIBILITY TEST ELECTRIC: CPT

## 2023-10-04 PROCEDURE — 84156 ASSAY OF PROTEIN URINE: CPT | Performed by: OBSTETRICS & GYNECOLOGY

## 2023-10-04 PROCEDURE — 82805 BLOOD GASES W/O2 SATURATION: CPT | Performed by: OBSTETRICS & GYNECOLOGY

## 2023-10-04 PROCEDURE — 80307 DRUG TEST PRSMV CHEM ANLYZR: CPT

## 2023-10-04 PROCEDURE — 80053 COMPREHEN METABOLIC PANEL: CPT | Performed by: OBSTETRICS & GYNECOLOGY

## 2023-10-04 PROCEDURE — 59514 CESAREAN DELIVERY ONLY: CPT | Performed by: OBSTETRICS & GYNECOLOGY

## 2023-10-04 PROCEDURE — 86901 BLOOD TYPING SEROLOGIC RH(D): CPT | Performed by: OBSTETRICS & GYNECOLOGY

## 2023-10-04 PROCEDURE — 87086 URINE CULTURE/COLONY COUNT: CPT | Performed by: OBSTETRICS & GYNECOLOGY

## 2023-10-04 PROCEDURE — 87806 HIV AG W/HIV1&2 ANTB W/OPTIC: CPT | Performed by: OBSTETRICS & GYNECOLOGY

## 2023-10-04 RX ORDER — METHYLERGONOVINE MALEATE 0.2 MG/ML
INJECTION INTRAVENOUS
Status: DISCONTINUED
Start: 2023-10-04 | End: 2023-10-05

## 2023-10-04 RX ORDER — IBUPROFEN 600 MG/1
600 TABLET ORAL EVERY 6 HOURS
Status: DISCONTINUED | OUTPATIENT
Start: 2023-10-06 | End: 2023-10-06 | Stop reason: HOSPADM

## 2023-10-04 RX ORDER — ENOXAPARIN SODIUM 100 MG/ML
40 INJECTION SUBCUTANEOUS DAILY
Status: DISCONTINUED | OUTPATIENT
Start: 2023-10-05 | End: 2023-10-06 | Stop reason: HOSPADM

## 2023-10-04 RX ORDER — SODIUM CHLORIDE, SODIUM LACTATE, POTASSIUM CHLORIDE, CALCIUM CHLORIDE 600; 310; 30; 20 MG/100ML; MG/100ML; MG/100ML; MG/100ML
125 INJECTION, SOLUTION INTRAVENOUS CONTINUOUS
Status: DISCONTINUED | OUTPATIENT
Start: 2023-10-04 | End: 2023-10-06 | Stop reason: HOSPADM

## 2023-10-04 RX ORDER — OXYTOCIN/RINGER'S LACTATE 30/500 ML
PLASTIC BAG, INJECTION (ML) INTRAVENOUS
Status: COMPLETED
Start: 2023-10-04 | End: 2023-10-04

## 2023-10-04 RX ORDER — METHYLERGONOVINE MALEATE 0.2 MG/ML
INJECTION INTRAVENOUS
Status: DISPENSED
Start: 2023-10-04 | End: 2023-10-05

## 2023-10-04 RX ORDER — OXYCODONE HYDROCHLORIDE 5 MG/1
10 TABLET ORAL EVERY 4 HOURS PRN
Status: DISCONTINUED | OUTPATIENT
Start: 2023-10-05 | End: 2023-10-06 | Stop reason: HOSPADM

## 2023-10-04 RX ORDER — BUPIVACAINE HYDROCHLORIDE 7.5 MG/ML
INJECTION, SOLUTION INTRASPINAL AS NEEDED
Status: DISCONTINUED | OUTPATIENT
Start: 2023-10-04 | End: 2023-10-04

## 2023-10-04 RX ORDER — CARBOPROST TROMETHAMINE 250 UG/ML
INJECTION, SOLUTION INTRAMUSCULAR
Status: DISPENSED
Start: 2023-10-04 | End: 2023-10-05

## 2023-10-04 RX ORDER — MORPHINE SULFATE 0.5 MG/ML
INJECTION, SOLUTION EPIDURAL; INTRATHECAL; INTRAVENOUS AS NEEDED
Status: DISCONTINUED | OUTPATIENT
Start: 2023-10-04 | End: 2023-10-04

## 2023-10-04 RX ORDER — ONDANSETRON 2 MG/ML
4 INJECTION INTRAMUSCULAR; INTRAVENOUS ONCE AS NEEDED
Status: DISCONTINUED | OUTPATIENT
Start: 2023-10-04 | End: 2023-10-04 | Stop reason: SDUPTHER

## 2023-10-04 RX ORDER — ONDANSETRON 2 MG/ML
4 INJECTION INTRAMUSCULAR; INTRAVENOUS EVERY 6 HOURS PRN
Status: DISPENSED | OUTPATIENT
Start: 2023-10-04 | End: 2023-10-05

## 2023-10-04 RX ORDER — KETOROLAC TROMETHAMINE 30 MG/ML
30 INJECTION, SOLUTION INTRAMUSCULAR; INTRAVENOUS EVERY 6 HOURS PRN
Status: ACTIVE | OUTPATIENT
Start: 2023-10-04 | End: 2023-10-05

## 2023-10-04 RX ORDER — DIAPER,BRIEF,INFANT-TODD,DISP
1 EACH MISCELLANEOUS DAILY PRN
Status: DISCONTINUED | OUTPATIENT
Start: 2023-10-04 | End: 2023-10-06 | Stop reason: HOSPADM

## 2023-10-04 RX ORDER — OXYCODONE HYDROCHLORIDE 5 MG/1
5 TABLET ORAL EVERY 4 HOURS PRN
Status: DISCONTINUED | OUTPATIENT
Start: 2023-10-05 | End: 2023-10-06 | Stop reason: SDUPTHER

## 2023-10-04 RX ORDER — SODIUM CHLORIDE, SODIUM LACTATE, POTASSIUM CHLORIDE, CALCIUM CHLORIDE 600; 310; 30; 20 MG/100ML; MG/100ML; MG/100ML; MG/100ML
125 INJECTION, SOLUTION INTRAVENOUS CONTINUOUS
Status: DISCONTINUED | OUTPATIENT
Start: 2023-10-04 | End: 2023-10-04

## 2023-10-04 RX ORDER — CALCIUM CARBONATE 500 MG/1
1000 TABLET, CHEWABLE ORAL DAILY PRN
Status: DISCONTINUED | OUTPATIENT
Start: 2023-10-04 | End: 2023-10-06 | Stop reason: HOSPADM

## 2023-10-04 RX ORDER — KETOROLAC TROMETHAMINE 30 MG/ML
15 INJECTION, SOLUTION INTRAMUSCULAR; INTRAVENOUS EVERY 6 HOURS PRN
Status: ACTIVE | OUTPATIENT
Start: 2023-10-04 | End: 2023-10-05

## 2023-10-04 RX ORDER — FENTANYL CITRATE 50 UG/ML
INJECTION, SOLUTION INTRAMUSCULAR; INTRAVENOUS
Status: COMPLETED
Start: 2023-10-04 | End: 2023-10-04

## 2023-10-04 RX ORDER — OXYTOCIN/RINGER'S LACTATE 30/500 ML
PLASTIC BAG, INJECTION (ML) INTRAVENOUS CONTINUOUS PRN
Status: DISCONTINUED | OUTPATIENT
Start: 2023-10-04 | End: 2023-10-04

## 2023-10-04 RX ORDER — ONDANSETRON 2 MG/ML
4 INJECTION INTRAMUSCULAR; INTRAVENOUS EVERY 8 HOURS PRN
Status: DISCONTINUED | OUTPATIENT
Start: 2023-10-05 | End: 2023-10-06 | Stop reason: HOSPADM

## 2023-10-04 RX ORDER — SIMETHICONE 80 MG
80 TABLET,CHEWABLE ORAL 4 TIMES DAILY PRN
Status: DISCONTINUED | OUTPATIENT
Start: 2023-10-04 | End: 2023-10-06 | Stop reason: HOSPADM

## 2023-10-04 RX ORDER — ACETAMINOPHEN 325 MG/1
975 TABLET ORAL EVERY 6 HOURS SCHEDULED
Status: DISCONTINUED | OUTPATIENT
Start: 2023-10-04 | End: 2023-10-06 | Stop reason: HOSPADM

## 2023-10-04 RX ORDER — FENTANYL CITRATE 50 UG/ML
INJECTION, SOLUTION INTRAMUSCULAR; INTRAVENOUS AS NEEDED
Status: DISCONTINUED | OUTPATIENT
Start: 2023-10-04 | End: 2023-10-04

## 2023-10-04 RX ORDER — ONDANSETRON 2 MG/ML
4 INJECTION INTRAMUSCULAR; INTRAVENOUS EVERY 8 HOURS PRN
Status: DISCONTINUED | OUTPATIENT
Start: 2023-10-04 | End: 2023-10-04 | Stop reason: SDUPTHER

## 2023-10-04 RX ORDER — NALBUPHINE HYDROCHLORIDE 10 MG/ML
5 INJECTION, SOLUTION INTRAMUSCULAR; INTRAVENOUS; SUBCUTANEOUS
Status: DISPENSED | OUTPATIENT
Start: 2023-10-04 | End: 2023-10-05

## 2023-10-04 RX ORDER — KETOROLAC TROMETHAMINE 30 MG/ML
INJECTION, SOLUTION INTRAMUSCULAR; INTRAVENOUS
Status: COMPLETED
Start: 2023-10-04 | End: 2023-10-04

## 2023-10-04 RX ORDER — OXYCODONE HYDROCHLORIDE 5 MG/1
5 TABLET ORAL EVERY 4 HOURS PRN
Status: DISCONTINUED | OUTPATIENT
Start: 2023-10-04 | End: 2023-10-06 | Stop reason: HOSPADM

## 2023-10-04 RX ORDER — CEFAZOLIN SODIUM 2 G/50ML
2000 SOLUTION INTRAVENOUS ONCE
Status: COMPLETED | OUTPATIENT
Start: 2023-10-04 | End: 2023-10-04

## 2023-10-04 RX ORDER — KETOROLAC TROMETHAMINE 30 MG/ML
30 INJECTION, SOLUTION INTRAMUSCULAR; INTRAVENOUS EVERY 6 HOURS SCHEDULED
Status: DISPENSED | OUTPATIENT
Start: 2023-10-04 | End: 2023-10-06

## 2023-10-04 RX ORDER — NALOXONE HYDROCHLORIDE 0.4 MG/ML
0.1 INJECTION, SOLUTION INTRAMUSCULAR; INTRAVENOUS; SUBCUTANEOUS
Status: ACTIVE | OUTPATIENT
Start: 2023-10-04 | End: 2023-10-05

## 2023-10-04 RX ORDER — DIPHENHYDRAMINE HYDROCHLORIDE 50 MG/ML
25 INJECTION INTRAMUSCULAR; INTRAVENOUS EVERY 6 HOURS PRN
Status: DISCONTINUED | OUTPATIENT
Start: 2023-10-04 | End: 2023-10-06 | Stop reason: HOSPADM

## 2023-10-04 RX ORDER — OXYTOCIN/RINGER'S LACTATE 30/500 ML
62.5 PLASTIC BAG, INJECTION (ML) INTRAVENOUS ONCE
Status: DISCONTINUED | OUTPATIENT
Start: 2023-10-04 | End: 2023-10-06 | Stop reason: HOSPADM

## 2023-10-04 RX ORDER — ONDANSETRON 2 MG/ML
INJECTION INTRAMUSCULAR; INTRAVENOUS AS NEEDED
Status: DISCONTINUED | OUTPATIENT
Start: 2023-10-04 | End: 2023-10-04

## 2023-10-04 RX ORDER — ONDANSETRON 2 MG/ML
INJECTION INTRAMUSCULAR; INTRAVENOUS
Status: COMPLETED
Start: 2023-10-04 | End: 2023-10-04

## 2023-10-04 RX ORDER — SENNOSIDES 8.6 MG
1 TABLET ORAL DAILY
Status: DISCONTINUED | OUTPATIENT
Start: 2023-10-05 | End: 2023-10-06 | Stop reason: HOSPADM

## 2023-10-04 RX ORDER — MORPHINE SULFATE 0.5 MG/ML
INJECTION, SOLUTION EPIDURAL; INTRATHECAL; INTRAVENOUS
Status: COMPLETED
Start: 2023-10-04 | End: 2023-10-04

## 2023-10-04 RX ORDER — CITRIC ACID/SODIUM CITRATE 334-500MG
30 SOLUTION, ORAL ORAL ONCE
Status: COMPLETED | OUTPATIENT
Start: 2023-10-04 | End: 2023-10-04

## 2023-10-04 RX ADMIN — Medication 250 MILLI-UNITS/MIN: at 16:53

## 2023-10-04 RX ADMIN — Medication 250 MILLI-UNITS/MIN: at 15:53

## 2023-10-04 RX ADMIN — NALBUPHINE HYDROCHLORIDE 5 MG: 10 INJECTION, SOLUTION INTRAMUSCULAR; INTRAVENOUS; SUBCUTANEOUS at 22:22

## 2023-10-04 RX ADMIN — SODIUM CITRATE AND CITRIC ACID MONOHYDRATE 30 ML: 500; 334 SOLUTION ORAL at 13:35

## 2023-10-04 RX ADMIN — SODIUM CHLORIDE, SODIUM LACTATE, POTASSIUM CHLORIDE, AND CALCIUM CHLORIDE 125 ML/HR: .6; .31; .03; .02 INJECTION, SOLUTION INTRAVENOUS at 14:14

## 2023-10-04 RX ADMIN — SODIUM CHLORIDE, SODIUM LACTATE, POTASSIUM CHLORIDE, AND CALCIUM CHLORIDE 999 ML/HR: .6; .31; .03; .02 INJECTION, SOLUTION INTRAVENOUS at 13:36

## 2023-10-04 RX ADMIN — MORPHINE SULFATE 0.1 MG: 0.5 INJECTION, SOLUTION EPIDURAL; INTRATHECAL; INTRAVENOUS at 15:26

## 2023-10-04 RX ADMIN — FENTANYL CITRATE 15 MCG: 50 INJECTION INTRAMUSCULAR; INTRAVENOUS at 15:26

## 2023-10-04 RX ADMIN — KETOROLAC TROMETHAMINE 30 MG: 30 INJECTION, SOLUTION INTRAMUSCULAR at 16:28

## 2023-10-04 RX ADMIN — CEFAZOLIN SODIUM 2000 MG: 2 SOLUTION INTRAVENOUS at 15:28

## 2023-10-04 RX ADMIN — SODIUM CHLORIDE, SODIUM LACTATE, POTASSIUM CHLORIDE, AND CALCIUM CHLORIDE: .6; .31; .03; .02 INJECTION, SOLUTION INTRAVENOUS at 16:53

## 2023-10-04 RX ADMIN — BUPIVACAINE HYDROCHLORIDE IN DEXTROSE 2 ML: 7.5 INJECTION, SOLUTION SUBARACHNOID at 15:26

## 2023-10-04 RX ADMIN — ONDANSETRON 4 MG: 2 INJECTION INTRAMUSCULAR; INTRAVENOUS at 22:19

## 2023-10-04 RX ADMIN — ONDANSETRON 4 MG: 2 INJECTION INTRAMUSCULAR; INTRAVENOUS at 15:44

## 2023-10-04 RX ADMIN — SODIUM CHLORIDE, SODIUM LACTATE, POTASSIUM CHLORIDE, AND CALCIUM CHLORIDE 1000 ML: .6; .31; .03; .02 INJECTION, SOLUTION INTRAVENOUS at 13:05

## 2023-10-04 RX ADMIN — OXYCODONE HYDROCHLORIDE 5 MG: 5 TABLET ORAL at 21:44

## 2023-10-04 RX ADMIN — ACETAMINOPHEN 325MG 975 MG: 325 TABLET ORAL at 20:25

## 2023-10-04 NOTE — ASSESSMENT & PLAN NOTE
ASCUS, HR-HPV other positive  Needs colpo postpartum, plan to schedule appt today prior to pt discharge

## 2023-10-04 NOTE — ASSESSMENT & PLAN NOTE
Last use yesterday  UDS positive for [facility]  Patient needs CYS clearance prior to discharge

## 2023-10-04 NOTE — ASSESSMENT & PLAN NOTE
Diagnosis postpartum  Asymptomatic  Systolic (65DJB), CL , Min:130 , GXL:024   Diastolic (61JFM), XCL:63, Min:71, Max:82  CBC/CMP wnl  P/Cr: 0.15  BP monitoring program

## 2023-10-04 NOTE — LETTER
[address] [facility]  [address] [address]  Dept: [phone]    October 6, 2023     Patient: Pricila Hauser   YOB: 1995   Date of Visit: 10/4/2023       To Whom it May Concern:    Frann Gitelman is under my professional care. She was seen in the hospital from 10/4/2023 to 10/06/23. Her  was here during this time. Please excuse him from work on these dates. They may return to work on 10/9 without limitations. If you have any questions or concerns, please don't hesitate to call.          Sincerely,           Tiffanie Gar MD

## 2023-10-04 NOTE — ANESTHESIA PREPROCEDURE EVALUATION
Procedure:   SECTION () REPEAT (Uterus)    Relevant Problems   GYN   (+) 38 weeks gestation of pregnancy   (+) Short interval between pregnancies complicating pregnancy, antepartum, third trimester      HEMATOLOGY   (+) Anemia      PULMONARY   (+) Mild persistent asthma without complication        Physical Exam    Airway    Mallampati score: II         Dental       Cardiovascular  Rhythm: regular, Rate: normal,     Pulmonary  Breath sounds clear to auscultation,     Other Findings        Anesthesia Plan  ASA Score- 2     Anesthesia Type- spinal with ASA Monitors. Additional Monitors:   Airway Plan:           Plan Factors-Exercise tolerance (METS): >4 METS. Chart reviewed. Existing labs reviewed. Patient summary reviewed. Patient is not a current smoker. Patient not instructed to abstain from smoking on day of procedure. Patient did not smoke on day of surgery. Obstructive sleep apnea risk education given perioperatively. Induction- intravenous. Postoperative Plan-     Informed Consent- Anesthetic plan and risks discussed with patient.

## 2023-10-04 NOTE — ASSESSMENT & PLAN NOTE
[email or web address]  Limited prenatal care - CM consult, prenatal labs ordered  No glucola - fingerstick glucose  Anesthesia consult  Ancef 2g IV preop prophylaxis   Contraception: POPs, declines inpatient LARC  To OR for RLTCS

## 2023-10-04 NOTE — OP NOTE
OPERATIVE REPORT  PATIENT NAME: Nayla Voss    :  1995  MRN: [id number]  Pt Location: [facility] L[facility]D OR ROOM 01    SURGERY DATE: 10/4/2023    Indications:   History of prior  section x4    Pre-operative Diagnosis:   39w1d pregnancy  History of prior  section x4  GBS positive  History of postpartum hemorrhage requiring blood transfusion  Anemia  Elevated blood pressure without diagnosis of hypertension  History of gestational hypertension  Limited prenatal care  Asthma    Post-operative Diagnosis:   RLTCS     Attending: Amie Jackson MD  Resident: Guru Gerardo MD    Maternal Findings:  Uterus with thin lower uterine segment. No uterine window present. Thin bladder adhesions to the lower uterine segement. Normal tubes and ovaries bilaterally  Dense adhesive disease between fascia, rectus muscles and peritoneum. Omental adhesions to the anterior abdominal wall     Findings:  Viable female weighing 6lbs 11.2oz;  Apgar scores of 8 at one minute and 9 at five minutes. Clear amniotic fluid  Normal placenta with 3-vessel cord. No evidence of placenta accreta spectrum    Arterial and Venous Gases:  Umbilical Cord Venous Blood Gas:  Results from last 7 days   Lab Units 10/04/23  1552   PH COV  7.304   PCO2 COV mm HG 52.3*   HCO3 COV mmol/L 25.4   BASE EXC COV mmol/L -2.0*   O2 CT CD VB mL/dL 6.5   O2 HGB, VENOUS CORD % 33.8     Umbilical Cord Arterial Blood Gas: unable to collect 2/2 thin cord        Specimens: Arterial and venous cord gases, cord blood, segment of umbilical cord, placenta to storage    Quantitative Blood Loss: 252 mL    Fluids: 1 L LR    Drains: Alvarenga catheter           Complications:  none apparent           Disposition: PACU  and hemodynamically stable           Condition: stable    Procedure Details   The patient was seen prior to the procedure.  Risks, benefits, possible complications, alternate treatment options, and expected outcomes were discussed with the patient. The patient agreed with the proposed plan and gave informed consent for a repeat low transverse  section. The patient was taken to the [facility] Operating Room  where she received spinal anesthesia. For infection prophylaxis, she received 2g ancef IV preoperatively. Fetal heart tones in the OR were assessed and noted to be within normal limits and a Alvarenga catheter and SCDs were placed. The abdomen was prepped with Chloraprep, the vagina was prepped with Chlorhexidine, and following appropriate drying time, the patient was draped in the usual sterile manner. A Time Out was held and the above information confirmed. The patient was identified as Rebecca Espinoza and the procedure verified as a  Delivery for history of prior  section. A Pfannenstiel incision was made and carried down through the underlying subcutaneous tissue to the fascia using a scalpel. The rectus fascia was then nicked in the midline and dissected laterally using Oliveira scissors. The superior edge of the  fascial incision was grasped with Kocher clamps bilaterally, tented upward and the underlying rectus muscles were dissected off sharply with the scalpel. Bilateral rectus muscles were carefully transected using the Bovie due to tight adhesions and to allow access to peritoneal cavity. The peritoneum was identified, entered, and extended longitudinally with blunt dissection. Omental adhesions to the lower anterior abdominal wall were taken down with the Bovie. The bladder blade was inserted. The vesicouterine peritoneum was identified, entered sharply, and dissected laterally with  Metzenbaum scissors to form a bladder flap. The bladder blade was repositioned. A low transverse uterine incision was made with the scalpel and extended cephalocaudad with blunt dissection. The amnion was entered bluntly.       The fetal head was palpated, elevated, and delivered through the uterine incision followed by the body without difficulty. Time of birth was noted at 36. There was noted to be spontaneous cry and good tone. There was no apparent injury to the . The umbilical cord was doubly clamped and cut after 30 seconds to allow for delayed cord clamping. The infant was handed off to the  providers. Arterial and venous cord gases, cord blood, and a segment of umbilical cord were obtained for evaluation. The placenta delivered spontaneously at 24-20-52-61 with uterine fundal massage and appeared normal. The uterus was exteriorized and cleaned out with a moist lap sponge. There was no evidence of placenta accreta. The uterine incision was closed with a running non-locked suture of 0 Vicryl. The posterior culdesac was suctioned of clots and debris. The uterus was returned to the abdomen. Uterine serosal bleeding was cauterized with the Bovie. The paracolic gutters were inspected and cleared of all clots and debris. Bladder blade was removed. Bladder was inspected and was intact and hemostatic. Rectus muscles and fascia were inspected and minor bleeding cauterized with the bovie. Hemostasis was excellent. The fascia was closed with a running suture of 0 Vicryl. Subcutaneous adipose tissue was closed with a running suture of 2-0 Vicryl. The skin was closed with a subcuticular running suture of 4-0 Monocryl. Benzoin and steri-strip dressings were applied. The patient appeared to tolerate the procedure very well. Lap sponge, needle, and instrument counts were correct x2. The patient's fundus was palpated and the uterus was expressed. She was then cleaned and transferred to her postpartum recovery room in stable condition and her infant went to the  nursery. Attending Attestation: Dr. Radames Richardson MD was present for the entire procedure.       SIGNATURE: Mayte Flores MD  DATE: 2023  TIME: 5:02 PM

## 2023-10-04 NOTE — H&P
21 [facility] 25 y.o. female MRN: [id number]  Unit/Bed#: L&D 327-01 Encounter: [id number]    Assessment: 25 y.o. [id number] at 37w4d admitted for scheduled RLTCS for history of prior  section     Plan:   History of   Assessment & Plan  History of 4 prior  sections   Anterior placenta, no sonographic features concerning for PAS on Level II  Discussed risk of PAS, adhesive disease, injury to intraabdominal organs, PPH, possible need for  hysterectomy  Increased hemorrhage risk --> T&C x 2u pRBCs, two IVs    * 39 weeks gestation of pregnancy  Assessment & Plan  PAO, [email or web address]  Limited prenatal care - CM consult, prenatal labs ordered  No glucola - fingerstick glucose  Anesthesia consult  Ancef 2g IV preop prophylaxis   Contraception: POPs, declines inpatient LARC  To OR for RLTCS    Anemia  Assessment & Plan  Lab Results   Component Value Date    HGB 10.6 (L) 10/04/2023     10.6 on admit, previously 8 in 2023  Hx of chronic anemia, normal electrophoresis in 2018  Hx of blood transfusion for symptomatic anemia in past  T&C x 2u pRBCs    ASCUS with positive high risk HPV cervical  Assessment & Plan  ASCUS, HR-HPV other positive  Needs colpo postpartum    Late prenatal care affecting pregnancy in third trimester  Assessment & Plan  Limited prenatal care secondary to transportation limitations  CM consult postpartum    Mild persistent asthma without complication  Assessment & Plan  Albuterol prn    History of marijuana use  Assessment & Plan  Last use yesterday  UDS    History of gestational hypertension  Assessment & Plan  Elevated blood pressure on admission, no other documented elevated blood pressures in pregnancy  F/u admission CMP, P/Cr ratio      D/w Dr. Julien Pillai      Chief Complaint: here for my     HPI: Gerardo Kapoor is a 25 y.o. [id number] with an IDA of 10/10/2023, by Last Menstrual Period at 39w1d who is being admitted for scheduled repeat  section. She denies having uterine contractions, has no LOF, and reports no VB. She states that the baby moves as usual.    Pregnancy complications  1) history of prior  section x 4  2) history of gestational hypertension  3) short interval pregnancy  4) asthma  5) ASCUS HR-HPV Other   6) Anemia - normal hemoglobin electrophoresis 2018; history of prior blood transfusion  7) reported history of postpartum hemorrhage  8) limited prenatal care 2/2 transportation limitations      Baby complications/comments:     Vertex presentation  Fetal growth appeared normal  Placenta Location = Anterior  No placenta previa     MEASUREMENTS (* Included In Average GA)     AC             31.26 cm        35 weeks 1 day * (51%)  BPD             8.67 cm        35 weeks 0 days* (42%)  HC             31.60 cm        35 weeks 4 days* (20%)  Femur           7.19 cm        36 weeks 6 days* (79%)     NBL              9.2 mm     Cerebellum       4.3 cm        33 weeks 6 days  CisternaMagna    6.6 mm     EFW Hadlock 4   2729 grams - 6 lbs 0 oz                 (55%)    Review of Systems   Constitutional: Negative for chills and fever. HENT: Negative. Eyes: Negative for visual disturbance. Respiratory: Negative for shortness of breath. Cardiovascular: Negative for chest pain. Gastrointestinal: Negative for abdominal pain, constipation, diarrhea, nausea and vomiting. Genitourinary: Negative for dysuria, hematuria, vaginal bleeding, vaginal discharge and vaginal pain. Neurological: Negative for headaches. Psychiatric/Behavioral: Negative.         OB History    Para Term  AB Living   5 4 4     4   SAB IAB Ectopic Multiple Live Births         0 4      # Outcome Date GA Lbr Wai/2nd Weight Sex Delivery Anes PTL Lv   5 Current            4 Term 22 39w1d  3920 g (8 lb 10.3 oz) M    GENEVA   3 Term 18 39w1d  3204 g (7 lb 1 oz) F CS-LTranv Spinal N GENEVA   2 Term 01/27/15 39w0d  4366 g (9 lb 10 oz) M CS-LTranv Spinal N GENEVA      Complications: Other Excessive Bleeding, Gestational hypertension   1 Term 14 40w0d   M CS-LTranv Spinal N GENEVA      Complications: Fetal Intolerance, Other Excessive Bleeding, Gestational hypertension       Past Medical History:   Diagnosis Date   • Asthma        Past Surgical History:   Procedure Laterality Date   •  SECTION     • PA  DELIVERY ONLY N/A 2018    Procedure:  SECTION () REPEAT;  Surgeon: Justine Polo DO;  Location: [facility];  Service: Obstetrics   • PA  DELIVERY ONLY N/A 2022    Procedure: Alka Glee () REPEAT;  Surgeon: Stefan Chaidez MD;  Location: [facility];  Service: Obstetrics       Social History     Tobacco Use   • Smoking status: Never   • Smokeless tobacco: Never   Substance Use Topics   • Alcohol use: No       Allergies   Allergen Reactions   • Iron Dizziness       Medications Prior to Admission   Medication   • albuterol (Proventil HFA) 90 mcg/act inhaler       Objective:  HR:  [68] 68  Resp:  [18] 18  BP: (140)/(80) 140/80  Body mass index is 35.12 kg/m². Physical Exam:  Physical Exam  Constitutional:       General: She is not in acute distress. Appearance: Normal appearance. She is well-developed. She is not ill-appearing, toxic-appearing or diaphoretic. HENT:      Head: Normocephalic and atraumatic. Eyes:      General: No scleral icterus. Extraocular Movements: Extraocular movements intact. Conjunctiva/sclera: Conjunctivae normal.   Cardiovascular:      Rate and Rhythm: Normal rate. Pulmonary:      Effort: Pulmonary effort is normal.   Abdominal:      Palpations: Abdomen is soft. Tenderness: There is no abdominal tenderness. Comments: Gravid, nontender   Musculoskeletal:      Right lower leg: No edema. Left lower leg: No edema. Neurological:      General: No focal deficit present.       Mental Status: She is alert and oriented to person, place, and time.   Skin:     General: Skin is warm and dry. Psychiatric:         Mood and Affect: Mood normal.         Behavior: Behavior normal.   Vitals reviewed.           FHT:  Baseline: 125 bpm  Variability: Moderate 6-25 bpm  Accelerations: 15x15 present  Decelerations: none    TOCO: irregular mild contractions       Lab Results   Component Value Date    WBC 8.37 10/04/2023    HGB 10.6 (L) 10/04/2023    HCT 33.7 (L) 10/04/2023     10/04/2023     Lab Results   Component Value Date     06/01/2018    K 3.5 10/04/2023     10/04/2023    CO2 20 (L) 10/04/2023    BUN 7 10/04/2023    CREATININE 0.37 (L) 10/04/2023    GLUCOSE 67 (L) 06/01/2018    AST 11 (L) 10/04/2023    ALT 7 10/04/2023     Prenatal Labs: Reviewed      Blood type: A Positive  Antibody: pending  GBS: Positive  HIV: pending  Rubella: pending  VDRL/RPR: pending  HBsAg: pending  Chlamydia: Negative  Gonorrhea: Negative  Diabetes 1 hour screen: not completed    >2 Midnights  INPATIENT     Signature/Title: Leeann Campo MD  Date: 10/4/2023  Time: 1:42 PM

## 2023-10-04 NOTE — ASSESSMENT & PLAN NOTE
Lab Results   Component Value Date    HGB 8.9 (L) 10/05/2023     10.6 on admit, previously 8 in August 2023  Hx of chronic anemia, normal electrophoresis in 2018  Hx of blood transfusion for symptomatic anemia in past  Currently asymptomatic

## 2023-10-04 NOTE — ANESTHESIA PROCEDURE NOTES
Spinal Block    Patient location during procedure: OB  Start time: 10/4/2023 3:26 PM  Reason for block: primary anesthetic  Staffing  Performed by: Jose Antonio Jenkins CRNA  Authorized by: Fiona Gooden DO    Preanesthetic Checklist  Completed: patient identified, IV checked, site marked, risks and benefits discussed, surgical consent, monitors and equipment checked, pre-op evaluation and timeout performed  Spinal Block  Patient position: sitting  Prep: Betadine  Patient monitoring: cardiac monitor, continuous pulse ox, frequent blood pressure checks and heart rate  Approach: midline  Location: L2-3  Injection technique: single-shot  Needle  Needle type: pencil-tip   Needle length: 5 cm  Assessment  Sensory level: T4  Events: cerebrospinal fluid  Injection Assessment:  negative aspiration for heme, no paresthesia on injection and positive aspiration for clear CSF.

## 2023-10-04 NOTE — LETTER
[address] [facility] AND DELIVERY  [address] [place] [address]  Dept: [phone]    2023     Patient: Azra Smith   YOB: 1995   Date of Visit: 10/4/2023       To Whom it May Concern:    Chester Evans is under my professional care. She was seen in the hospital from 10/4/2023 to 10/06/23 for the birth of her fifth baby by . Due to limitations related to childcare, she will have her other children stay with her Mother in [facility] and they will return to school on 10/16/2023. If you have any questions or concerns, please don't hesitate to call.          Sincerely,          Zak Santos MD

## 2023-10-04 NOTE — PLAN OF CARE
Problem: PAIN - ADULT  Goal: Verbalizes/displays adequate comfort level or baseline comfort level  Description: Interventions:  - Encourage patient to monitor pain and request assistance  - Assess pain using appropriate pain scale  - Administer analgesics based on type and severity of pain and evaluate response  - Implement non-pharmacological measures as appropriate and evaluate response  - Consider cultural and social influences on pain and pain management  - Notify physician/advanced practitioner if interventions unsuccessful or patient reports new pain  Outcome: Progressing     Problem: INFECTION - ADULT  Goal: Absence or prevention of progression during hospitalization  Description: INTERVENTIONS:  - Assess and monitor for signs and symptoms of infection  - Monitor lab/diagnostic results  - Monitor all insertion sites, i.e. indwelling lines, tubes, and drains  - Monitor endotracheal if appropriate and nasal secretions for changes in amount and color  - [place] appropriate cooling/warming therapies per order  - Administer medications as ordered  - Instruct and encourage patient and family to use good hand hygiene technique  - Identify and instruct in appropriate isolation precautions for identified infection/condition  Outcome: Progressing  Goal: Absence of fever/infection during neutropenic period  Description: INTERVENTIONS:  - Monitor WBC    Outcome: Progressing     Problem: SAFETY ADULT  Goal: Patient will remain free of falls  Description: INTERVENTIONS:  - Educate patient/family on patient safety including physical limitations  - Instruct patient to call for assistance with activity   - Consult OT/PT to assist with strengthening/mobility   - Keep Call bell within reach  - Keep bed low and locked with side rails adjusted as appropriate  - Keep care items and personal belongings within reach  - Initiate and maintain comfort rounds  - Make Fall Risk Sign visible to staff  - Offer Toileting every  Hours, in advance of need  - Initiate/Maintain alarm  - Obtain necessary fall risk management equipment:   - Apply yellow socks and bracelet for high fall risk patients  - Consider moving patient to room near nurses station  Outcome: Progressing  Goal: Maintain or return to baseline ADL function  Description: INTERVENTIONS:  -  Assess patient's ability to carry out ADLs; assess patient's baseline for ADL function and identify physical deficits which impact ability to perform ADLs (bathing, care of mouth/teeth, toileting, grooming, dressing, etc.)  - Assess/evaluate cause of self-care deficits   - Assess range of motion  - Assess patient's mobility; develop plan if impaired  - Assess patient's need for assistive devices and provide as appropriate  - Encourage maximum independence but intervene and supervise when necessary  - Involve family in performance of ADLs  - Assess for home care needs following discharge   - Consider OT consult to assist with ADL evaluation and planning for discharge  - Provide patient education as appropriate  Outcome: Progressing  Goal: Maintains/Returns to pre admission functional level  Description: INTERVENTIONS:  - Perform BMAT or MOVE assessment daily.   - Set and communicate daily mobility goal to care team and patient/family/caregiver. - Collaborate with rehabilitation services on mobility goals if consulted  - Perform Range of Motion  times a day. - Reposition patient every  hours.   - Dangle patient  times a day  - Stand patient  times a day  - Ambulate patient  times a day  - Out of bed to chair  times a day   - Out of bed for meals  times a day  - Out of bed for toileting  - Record patient progress and toleration of activity level   Outcome: Progressing     Problem: Knowledge Deficit  Goal: Patient/family/caregiver demonstrates understanding of disease process, treatment plan, medications, and discharge instructions  Description: Complete learning assessment and assess knowledge base.  Interventions:  - Provide teaching at level of understanding  - Provide teaching via preferred learning methods  Outcome: Progressing     Problem: DISCHARGE PLANNING  Goal: Discharge to home or other facility with appropriate resources  Description: INTERVENTIONS:  - Identify barriers to discharge w/patient and caregiver  - Arrange for needed discharge resources and transportation as appropriate  - Identify discharge learning needs (meds, wound care, etc.)  - Arrange for interpretive services to assist at discharge as needed  - Refer to Case Management Department for coordinating discharge planning if the patient needs post-hospital services based on physician/advanced practitioner order or complex needs related to functional status, cognitive ability, or social support system  Outcome: Progressing

## 2023-10-04 NOTE — ANESTHESIA POSTPROCEDURE EVALUATION
Post-Op Assessment Note    CV Status:  Stable  Pain Score: 1    Pain management: adequate     Mental Status:  Alert and awake   Hydration Status:  Euvolemic   PONV Controlled:  Controlled   Airway Patency:  Patent      Post Op Vitals Reviewed: Yes      Staff: Anesthesiologist         No notable events documented.     BP      Temp      Pulse     Resp      SpO2      /80 (BP Location: Right arm)   Pulse 68   Temp 98.2 °F (36.8 °C) (Oral)   Resp 18   Ht 5' 2" (1.575 m)   Wt 87.1 kg (192 lb)   LMP 01/03/2023 (Exact Date)   BMI 35.12 kg/m²

## 2023-10-04 NOTE — ASSESSMENT & PLAN NOTE
History of 4 prior  sections   Anterior placenta, no sonographic features concerning for PAS on Level II  Discussed risk of PAS, adhesive disease, injury to intraabdominal organs, PPH, possible need for  hysterectomy  Increased hemorrhage risk --> T&C x 2u pRBCs, two IVs

## 2023-10-04 NOTE — CASE MANAGEMENT
Case Management Progress Note    Patient name Jose Rafael Moss  Location LD OR  OR  MRN [id number]  : 1995 Date 10/4/2023       LOS (days): 0  Geometric Mean LOS (GMLOS) (days):   Days to GMLOS:        OBJECTIVE:        Current admission status: Inpatient  Preferred Pharmacy:   [facility] [address] [address]  Phone: [phone] Fax: [phone] [address] [place] [address], [facility] [address]  Phone: [phone] Fax: [phone]    Primary Care Provider: Enoch Blancas PA-C    Primary Insurance: Lilibeth Jim  Secondary Insurance:     PROGRESS NOTE:    CM received consult for limited prenatal care due to transportation issues. Will need childline referral due to (+) UDS for THC. MOB currently in OR. CM dept to follow up postpartum to complete assessment.

## 2023-10-05 PROBLEM — O13.9 GESTATIONAL HYPERTENSION: Status: ACTIVE | Noted: 2022-04-12

## 2023-10-05 LAB
ERYTHROCYTE [DISTWIDTH] IN BLOOD BY AUTOMATED COUNT: 14.7 % (ref 11.6–15.1)
HBV SURFACE AG SER QL: NORMAL
HCT VFR BLD AUTO: 28.9 % (ref 34.8–46.1)
HCV AB SER QL: NORMAL
HGB BLD-MCNC: 8.9 G/DL (ref 11.5–15.4)
HIV 1+2 AB+HIV1 P24 AG SERPL QL IA: NORMAL
HIV 2 AB SERPL QL IA: NORMAL
HIV1 AB SERPL QL IA: NORMAL
HIV1 P24 AG SERPL QL IA: NORMAL
MCH RBC QN AUTO: 25.6 PG (ref 26.8–34.3)
MCHC RBC AUTO-ENTMCNC: 30.8 G/DL (ref 31.4–37.4)
MCV RBC AUTO: 83 FL (ref 82–98)
PLATELET # BLD AUTO: 152 THOUSANDS/UL (ref 149–390)
PMV BLD AUTO: 13.3 FL (ref 8.9–12.7)
RBC # BLD AUTO: 3.48 MILLION/UL (ref 3.81–5.12)
TREPONEMA PALLIDUM IGG+IGM AB [PRESENCE] IN SERUM OR PLASMA BY IMMUNOASSAY: NORMAL
WBC # BLD AUTO: 7.82 THOUSAND/UL (ref 4.31–10.16)

## 2023-10-05 PROCEDURE — 99024 POSTOP FOLLOW-UP VISIT: CPT | Performed by: OBSTETRICS & GYNECOLOGY

## 2023-10-05 PROCEDURE — 94762 N-INVAS EAR/PLS OXIMTRY CONT: CPT

## 2023-10-05 PROCEDURE — 85027 COMPLETE CBC AUTOMATED: CPT | Performed by: OBSTETRICS & GYNECOLOGY

## 2023-10-05 RX ORDER — ALBUTEROL SULFATE 90 UG/1
2 AEROSOL, METERED RESPIRATORY (INHALATION) EVERY 4 HOURS PRN
Status: DISCONTINUED | OUTPATIENT
Start: 2023-10-05 | End: 2023-10-06 | Stop reason: HOSPADM

## 2023-10-05 RX ORDER — GUAIFENESIN 100 MG/5ML
200 SOLUTION ORAL EVERY 4 HOURS PRN
Status: DISCONTINUED | OUTPATIENT
Start: 2023-10-05 | End: 2023-10-06 | Stop reason: HOSPADM

## 2023-10-05 RX ADMIN — SENNOSIDES 8.6 MG: 8.6 TABLET, FILM COATED ORAL at 08:18

## 2023-10-05 RX ADMIN — KETOROLAC TROMETHAMINE 30 MG: 30 INJECTION, SOLUTION INTRAMUSCULAR; INTRAVENOUS at 06:35

## 2023-10-05 RX ADMIN — OXYCODONE HYDROCHLORIDE 10 MG: 5 TABLET ORAL at 21:18

## 2023-10-05 RX ADMIN — ACETAMINOPHEN 325MG 975 MG: 325 TABLET ORAL at 14:56

## 2023-10-05 RX ADMIN — GUAIFENESIN 200 MG: 200 SOLUTION ORAL at 02:11

## 2023-10-05 RX ADMIN — ACETAMINOPHEN 325MG 975 MG: 325 TABLET ORAL at 08:18

## 2023-10-05 RX ADMIN — ENOXAPARIN SODIUM 40 MG: 40 INJECTION SUBCUTANEOUS at 08:18

## 2023-10-05 RX ADMIN — ALBUTEROL SULFATE 2 PUFF: 90 AEROSOL, METERED RESPIRATORY (INHALATION) at 07:50

## 2023-10-05 RX ADMIN — KETOROLAC TROMETHAMINE 30 MG: 30 INJECTION, SOLUTION INTRAMUSCULAR; INTRAVENOUS at 12:35

## 2023-10-05 RX ADMIN — SODIUM CHLORIDE, SODIUM LACTATE, POTASSIUM CHLORIDE, AND CALCIUM CHLORIDE 125 ML/HR: .6; .31; .03; .02 INJECTION, SOLUTION INTRAVENOUS at 01:50

## 2023-10-05 RX ADMIN — SODIUM CHLORIDE, SODIUM LACTATE, POTASSIUM CHLORIDE, AND CALCIUM CHLORIDE 999 ML/HR: .6; .31; .03; .02 INJECTION, SOLUTION INTRAVENOUS at 06:34

## 2023-10-05 RX ADMIN — KETOROLAC TROMETHAMINE 30 MG: 30 INJECTION, SOLUTION INTRAMUSCULAR; INTRAVENOUS at 17:54

## 2023-10-05 RX ADMIN — KETOROLAC TROMETHAMINE 30 MG: 30 INJECTION, SOLUTION INTRAMUSCULAR; INTRAVENOUS at 00:19

## 2023-10-05 RX ADMIN — GUAIFENESIN 200 MG: 200 SOLUTION ORAL at 08:18

## 2023-10-05 RX ADMIN — ACETAMINOPHEN 325MG 975 MG: 325 TABLET ORAL at 02:11

## 2023-10-05 NOTE — PLAN OF CARE
Problem: PAIN - ADULT  Goal: Verbalizes/displays adequate comfort level or baseline comfort level  Description: Interventions:  - Encourage patient to monitor pain and request assistance  - Assess pain using appropriate pain scale  - Administer analgesics based on type and severity of pain and evaluate response  - Implement non-pharmacological measures as appropriate and evaluate response  - Consider cultural and social influences on pain and pain management  - Notify physician/advanced practitioner if interventions unsuccessful or patient reports new pain  Outcome: Progressing     Problem: INFECTION - ADULT  Goal: Absence or prevention of progression during hospitalization  Description: INTERVENTIONS:  - Assess and monitor for signs and symptoms of infection  - Monitor lab/diagnostic results  - Monitor all insertion sites, i.e. indwelling lines, tubes, and drains  - Monitor endotracheal if appropriate and nasal secretions for changes in amount and color  - [place] appropriate cooling/warming therapies per order  - Administer medications as ordered  - Instruct and encourage patient and family to use good hand hygiene technique  - Identify and instruct in appropriate isolation precautions for identified infection/condition  Outcome: Progressing  Goal: Absence of fever/infection during neutropenic period  Description: INTERVENTIONS:  - Monitor WBC    Outcome: Progressing     Problem: SAFETY ADULT  Goal: Patient will remain free of falls  Description: INTERVENTIONS:  - Educate patient/family on patient safety including physical limitations  - Instruct patient to call for assistance with activity   - Consult OT/PT to assist with strengthening/mobility   - Keep Call bell within reach  - Keep bed low and locked with side rails adjusted as appropriate  - Keep care items and personal belongings within reach  - Initiate and maintain comfort rounds  - Make Fall Risk Sign visible to staff    - Apply yellow socks and bracelet for high fall risk patients  - Consider moving patient to room near nurses station  Outcome: Progressing  Goal: Maintain or return to baseline ADL function  Description: INTERVENTIONS:  -  Assess patient's ability to carry out ADLs; assess patient's baseline for ADL function and identify physical deficits which impact ability to perform ADLs (bathing, care of mouth/teeth, toileting, grooming, dressing, etc.)  - Assess/evaluate cause of self-care deficits   - Assess range of motion  - Assess patient's mobility; develop plan if impaired  - Assess patient's need for assistive devices and provide as appropriate  - Encourage maximum independence but intervene and supervise when necessary  - Involve family in performance of ADLs  - Assess for home care needs following discharge   - Consider OT consult to assist with ADL evaluation and planning for discharge  - Provide patient education as appropriate  Outcome: Progressing  Goal: Maintains/Returns to pre admission functional level  Description: INTERVENTIONS:  - Perform BMAT or MOVE assessment daily.   - Set and communicate daily mobility goal to care team and patient/family/caregiver. - Collaborate with rehabilitation services on mobility goals if consulted  - Out of bed for toileting  - Record patient progress and toleration of activity level   Outcome: Progressing     Problem: Knowledge Deficit  Goal: Patient/family/caregiver demonstrates understanding of disease process, treatment plan, medications, and discharge instructions  Description: Complete learning assessment and assess knowledge base.   Interventions:  - Provide teaching at level of understanding  - Provide teaching via preferred learning methods  Outcome: Progressing     Problem: DISCHARGE PLANNING  Goal: Discharge to home or other facility with appropriate resources  Description: INTERVENTIONS:  - Identify barriers to discharge w/patient and caregiver  - Arrange for needed discharge resources and transportation as appropriate  - Identify discharge learning needs (meds, wound care, etc.)  - Arrange for interpretive services to assist at discharge as needed  - Refer to Case Management Department for coordinating discharge planning if the patient needs post-hospital services based on physician/advanced practitioner order or complex needs related to functional status, cognitive ability, or social support system  Outcome: Progressing

## 2023-10-05 NOTE — PROGRESS NOTES
Obstetrics Progress Note  Thania Nguyen 25 y.o. female MRN: [id number]  Unit/Bed#: L&D 310-01 Encounter: [id number]    Assessment/Plan:  Postoperative day #1 s/p repeat low transverse  delivery. Stable. Baby in room. By issue:  * Status post repeat low transverse  section  Assessment & Plan  • Routine postpartum care  • Encourage ambulation  • Encourage familial bonding  • Lactation support as needed  • Pain: Motrin/Tylenol around the clock, oxycodone if needed  • Postpartum Contraceptive plan: POPs  • Pre-delivery Hgb 10.6 --> Post Delivery pending, will order venofer if needed  • Espino catheter: in place, UOP: 0.48 cc/kg/hr, plan to pull espino for VT  • DVT Ppx: ambulation, SCDs, Lovenox 40 mg daily    Gestational hypertension  Assessment & Plan  Diagnosis postpartum  Asymptomatic  Systolic (39HSW), GQU:483 , Min:104 , BSX:332   Diastolic (18PMS), LCA:90, Min:57, Max:91  CBC/CMP wnl  P/Cr: 0.15  BP monitoring program    ASCUS with positive high risk HPV cervical  Assessment & Plan  ASCUS, HR-HPV other positive  Needs colpo postpartum, make sure to schedule prior to discharge    Late prenatal care affecting pregnancy in third trimester  Assessment & Plan  Limited prenatal care secondary to transportation limitations  CM consulted    Mild persistent asthma without complication  Assessment & Plan  Albuterol prn     History of marijuana use  Assessment & Plan  Last use yesterday  UDS positive for THC  CM consulted    Anemia  Assessment & Plan  Lab Results   Component Value Date    HGB 10.6 (L) 10/04/2023     10.6 on admit, previously 8 in 2023  Hx of chronic anemia, normal electrophoresis in 2018  Hx of blood transfusion for symptomatic anemia in past    Anticipate discharge 10/6. Subjective/Objective   Chief Complaint:   Post delivery. Subjective:   Pain: controlled. Tolerating PO: yes. Voiding: espino in place, plan for VT today. Flatus: no. Bowel Movement: no. Ambulating: yes. Chest pain: no. Shortness of breath: reports feeling symptomatic from her asthma and plans to use albuterol, will continue to monitor. Leg pain: no. Lochia: within normal limits. Infant feeding: bottle. Denies headache, vision changes, RUQ pain, lower extremity pain. Objective:   Vitals:   Temp:  [97.5 °F (36.4 °C)-98.2 °F (36.8 °C)] 97.9 °F (36.6 °C)  HR:  [51-80] 51  Resp:  [16-18] 16  BP: (104-140)/(57-91) 124/67     Intake/Output Summary (Last 24 hours) at 10/5/2023 0725  Last data filed at 10/5/2023 1572  Gross per 24 hour   Intake 3050 ml   Output 802 ml   Net 2248 ml       Physical Exam:   -General: alert and oriented x 3, in no apparent distress  -Cardiovascular: regular rate, warm and adequately perfused  -Pulmonary: normal effort  -Abdomen/Pelvis: soft, non-tender, non-distended, no rebound or guarding. Uterine fundus firm and non-tender, at the umbilicus.  Incision: CDI  -Extremities: Nontender    Lab Results   Component Value Date    WBC 8.37 10/04/2023    HGB 10.6 (L) 10/04/2023    HCT 33.7 (L) 10/04/2023    MCV 81 (L) 10/04/2023     10/04/2023         Feliz Brooks MD  PGY-I, OBGYN  10/5/2023, 7:25 AM

## 2023-10-05 NOTE — ASSESSMENT & PLAN NOTE
• Routine postpartum care  • Encourage ambulation  • Encourage familial bonding  • Lactation support as needed  • Pain: Motrin/Tylenol around the clock, oxycodone if needed  • Postpartum Contraceptive plan: POPs  • Pre-delivery Hgb 10.6 --> 8.9, patient reports an iron allergy and cannot have venofer  • Voiding spontaneously, passed VT   • DVT Ppx: ambulation, SCDs, Lovenox 40 mg daily  • Patient would like to go home today

## 2023-10-05 NOTE — PLAN OF CARE
Problem: PAIN - ADULT  Goal: Verbalizes/displays adequate comfort level or baseline comfort level  Description: Interventions:  - Encourage patient to monitor pain and request assistance  - Assess pain using appropriate pain scale  - Administer analgesics based on type and severity of pain and evaluate response  - Implement non-pharmacological measures as appropriate and evaluate response  - Consider cultural and social influences on pain and pain management  - Notify physician/advanced practitioner if interventions unsuccessful or patient reports new pain  Outcome: Progressing     Problem: INFECTION - ADULT  Goal: Absence or prevention of progression during hospitalization  Description: INTERVENTIONS:  - Assess and monitor for signs and symptoms of infection  - Monitor lab/diagnostic results  - Monitor all insertion sites, i.e. indwelling lines, tubes, and drains  - Monitor endotracheal if appropriate and nasal secretions for changes in amount and color  - [place] appropriate cooling/warming therapies per order  - Administer medications as ordered  - Instruct and encourage patient and family to use good hand hygiene technique  - Identify and instruct in appropriate isolation precautions for identified infection/condition  Outcome: Progressing  Goal: Absence of fever/infection during neutropenic period  Description: INTERVENTIONS:  - Monitor WBC    Outcome: Progressing     Problem: SAFETY ADULT  Goal: Patient will remain free of falls  Description: INTERVENTIONS:  - Educate patient/family on patient safety including physical limitations  - Instruct patient to call for assistance with activity   - Consult OT/PT to assist with strengthening/mobility   - Keep Call bell within reach  - Keep bed low and locked with side rails adjusted as appropriate  - Keep care items and personal belongings within reach  - Initiate and maintain comfort rounds  - Make Fall Risk Sign visible to staff  - Offer Toileting every  Hours, in advance of need  - Initiate/Maintain alarm  - Obtain necessary fall risk management equipment:   - Apply yellow socks and bracelet for high fall risk patients  - Consider moving patient to room near nurses station  Outcome: Progressing  Goal: Maintain or return to baseline ADL function  Description: INTERVENTIONS:  -  Assess patient's ability to carry out ADLs; assess patient's baseline for ADL function and identify physical deficits which impact ability to perform ADLs (bathing, care of mouth/teeth, toileting, grooming, dressing, etc.)  - Assess/evaluate cause of self-care deficits   - Assess range of motion  - Assess patient's mobility; develop plan if impaired  - Assess patient's need for assistive devices and provide as appropriate  - Encourage maximum independence but intervene and supervise when necessary  - Involve family in performance of ADLs  - Assess for home care needs following discharge   - Consider OT consult to assist with ADL evaluation and planning for discharge  - Provide patient education as appropriate  Outcome: Progressing  Goal: Maintains/Returns to pre admission functional level  Description: INTERVENTIONS:  - Perform BMAT or MOVE assessment daily.   - Set and communicate daily mobility goal to care team and patient/family/caregiver. - Collaborate with rehabilitation services on mobility goals if consulted  - Perform Range of Motion  times a day. - Reposition patient every  hours.   - Dangle patient  times a day  - Stand patient  times a day  - Ambulate patient  times a day  - Out of bed to chair  times a day   - Out of bed for meals  times a day  - Out of bed for toileting  - Record patient progress and toleration of activity level   Outcome: Progressing     Problem: Knowledge Deficit  Goal: Patient/family/caregiver demonstrates understanding of disease process, treatment plan, medications, and discharge instructions  Description: Complete learning assessment and assess knowledge base.  Interventions:  - Provide teaching at level of understanding  - Provide teaching via preferred learning methods  Outcome: Progressing     Problem: DISCHARGE PLANNING  Goal: Discharge to home or other facility with appropriate resources  Description: INTERVENTIONS:  - Identify barriers to discharge w/patient and caregiver  - Arrange for needed discharge resources and transportation as appropriate  - Identify discharge learning needs (meds, wound care, etc.)  - Arrange for interpretive services to assist at discharge as needed  - Refer to Case Management Department for coordinating discharge planning if the patient needs post-hospital services based on physician/advanced practitioner order or complex needs related to functional status, cognitive ability, or social support system  Outcome: Progressing

## 2023-10-05 NOTE — NURSING NOTE
Pt educated on the importance to use the hat to measure the urine. Pt has urinated and forgot to use the measuring hat twice.

## 2023-10-05 NOTE — CASE MANAGEMENT
Case Management Assessment & Discharge Planning Note    Patient name Montse Turner  Location L&D 310/L&D 736-93 MRN [id number]  : 1995 Date 10/5/2023       Current Admission Date: 10/4/2023  Current Admission Diagnosis:Status post repeat low transverse  section   Patient Active Problem List    Diagnosis Date Noted   • ASCUS with positive high risk HPV cervical 10/04/2023   • Late prenatal care affecting pregnancy in third trimester 2023   • Short interval between pregnancies complicating pregnancy, antepartum, third trimester 2023   • Sterilization consult 2023   • Mild persistent asthma without complication    • Status post repeat low transverse  section 2022   • History of marijuana use 2022   • Gestational hypertension 2022   •  2018   • Anemia 2014      LOS (days): 1  Geometric Mean LOS (GMLOS) (days):   Days to GMLOS:     OBJECTIVE:    Risk of Unplanned Readmission Score: 5.83         Current admission status: Inpatient       Preferred Pharmacy:   [address] [place] [address] [address], [place] [address] [address]  Phone: [phone] Fax: [phone]    [facility] [address] [place] [address]  Phone: [phone] Fax: [phone]    Primary Care Provider: Cinthya Seymour PA-C    Primary Insurance: [address] [place] [address]  Secondary Insurance:     ASSESSMENT:  [place] Proxies    There are no active Health Care Proxies on file. Patient Information  Admitted from[de-identified] Home  Mental Status: Alert  During Assessment patient was accompanied by:  Other-Comment (SO/FOB)  Assessment information provided by[de-identified] Patient  Support Systems: Spouse/significant other  [facility]: [address] [place] [address] do you live in?: [place]  Homeless/housing insecurity resource given?: N/A  Living Arrangements: Lives w/ Spouse/significant other, Other (Comment) (and 3 sons and 1 daughter (ages 16months through 5years of age))              Patient Information Continued  Income Source: Unemployed (stay at [employer]- FOB works FT)  Does patient have prescription coverage?: Yes  Does patient have a history of substance abuse?: Yes  Historical substance use preference: Marijuana (UDS (+) for Mom and Baby admits to using day of admission- FOB uses infrequently- states never at the same time)         Means of Transportation  Means of Transport to Appts[de-identified] Family transport (Had poor prenatal care due to not driving- FOB would drive pt to appointment if had off the day of same)  Was application for public transport provided?: Refused (aware of public transportation- issue is more having difficulty being she has the children with her and nobody to watch them while she's at an appointment)        DISCHARGE DETAILS:    Discharge planning discussed with[de-identified] pt and FOB Margy Campoverde [phone]        CM contacted family/caregiver?: Yes             Contacts  Patient Contacts: Margy Campoverde [phone]  Contact Method: In Person         DME Referral Provided  Referral made for DME?: No (Pt plans on bottle feeding)    Other Referral/Resources/Interventions Provided:  Government Services[de-identified] Child Protective Services / Child Abuse (Jona Goldstein"[facility] speaking to [facility] and providing information for report. Both pt and FOB aware of need to report being [facility] (+) for both pt and baby giving verbal understanding/agreement for same.)                                                      Additional Comments: Consulted for lack of prenatal care and THC Use- Pt is s/p LTCS at 44 1/7wks IUP giving birth to a baby girl on 10/4/23 with apgars 8/9 (per chart)- Pt is intending to bottle feed, has all needed baby items including car seat for d/c; is currently open with WIC, SNAP while receiving SSI.   She is aware of need to register baby girl with [facility] within 30 days    In speaking to pt, she admits to poor prenatal care due to lack of transportation but more so related to FOB (12 705 20 53) working FT, Grandmother of children  living in [place] and therefore has no childcare for her other children- she is able to make appointments when FOB was off work. Pt and FOB liver together with their children at [address] [place] [address] in [facility], [facility] and while UDS (+) [facility] for both pt and baby (showing no signs as of this assessment per charting of affects of the [facility]). FOB admits to using occassionally, but not often and never at the same time as pt. Due to this, [facility] C[facility]Y was called, with the understanding/agreement given of pt/FOB, speaking to Steven and providing requested information verbally. Will continue to follow, awaiting recommendation by [facility] for next step in reporting process- Baby is not cleared for d/c until this direction is provided. Leti Ortiz

## 2023-10-06 ENCOUNTER — DOCUMENTATION (OUTPATIENT)
Dept: OBGYN CLINIC | Facility: CLINIC | Age: 28
End: 2023-10-06

## 2023-10-06 VITALS
SYSTOLIC BLOOD PRESSURE: 139 MMHG | HEART RATE: 67 BPM | OXYGEN SATURATION: 97 % | WEIGHT: 192 LBS | RESPIRATION RATE: 18 BRPM | HEIGHT: 62 IN | DIASTOLIC BLOOD PRESSURE: 79 MMHG | BODY MASS INDEX: 35.33 KG/M2 | TEMPERATURE: 97.9 F

## 2023-10-06 PROCEDURE — 99024 POSTOP FOLLOW-UP VISIT: CPT | Performed by: STUDENT IN AN ORGANIZED HEALTH CARE EDUCATION/TRAINING PROGRAM

## 2023-10-06 RX ORDER — IBUPROFEN 600 MG/1
600 TABLET ORAL EVERY 6 HOURS
Qty: 30 TABLET | Refills: 0 | Status: SHIPPED | OUTPATIENT
Start: 2023-10-06

## 2023-10-06 RX ORDER — ACETAMINOPHEN 325 MG/1
975 TABLET ORAL EVERY 6 HOURS SCHEDULED
Qty: 21 TABLET | Refills: 0 | Status: SHIPPED | OUTPATIENT
Start: 2023-10-06 | End: 2023-10-08

## 2023-10-06 RX ORDER — OXYCODONE HYDROCHLORIDE 5 MG/1
5 TABLET ORAL EVERY 4 HOURS PRN
Qty: 10 TABLET | Refills: 0 | Status: SHIPPED | OUTPATIENT
Start: 2023-10-06 | End: 2023-10-16

## 2023-10-06 RX ADMIN — SENNOSIDES 8.6 MG: 8.6 TABLET, FILM COATED ORAL at 09:45

## 2023-10-06 RX ADMIN — ACETAMINOPHEN 325MG 975 MG: 325 TABLET ORAL at 03:05

## 2023-10-06 RX ADMIN — ENOXAPARIN SODIUM 40 MG: 40 INJECTION SUBCUTANEOUS at 09:44

## 2023-10-06 RX ADMIN — KETOROLAC TROMETHAMINE 30 MG: 30 INJECTION, SOLUTION INTRAMUSCULAR; INTRAVENOUS at 03:05

## 2023-10-06 RX ADMIN — SIMETHICONE 80 MG: 80 TABLET, CHEWABLE ORAL at 14:34

## 2023-10-06 RX ADMIN — ACETAMINOPHEN 325MG 975 MG: 325 TABLET ORAL at 09:44

## 2023-10-06 RX ADMIN — IBUPROFEN 600 MG: 600 TABLET ORAL at 09:45

## 2023-10-06 RX ADMIN — OXYCODONE HYDROCHLORIDE 5 MG: 5 TABLET ORAL at 14:32

## 2023-10-06 NOTE — PROGRESS NOTES
Obstetrics Progress Note  Thania Haque 25 y.o. female MRN: [id number]  Unit/Bed#: L&D 310-01 Encounter: [id number]    Assessment/Plan:  Postoperative day #2 s/p repeat low transverse  delivery. Stable. Baby in room. By issue:  * Status post repeat low transverse  section  Assessment & Plan  • Routine postpartum care  • Encourage ambulation  • Encourage familial bonding  • Lactation support as needed  • Pain: Motrin/Tylenol around the clock, oxycodone if needed  • Postpartum Contraceptive plan: POPs  • Pre-delivery Hgb 10.6 --> 8.9, patient reports an iron allergy and cannot have venofer  • Voiding spontaneously, passed VT   • DVT Ppx: ambulation, SCDs, Lovenox 40 mg daily  • Patient would like to go home today    Gestational hypertension  Assessment & Plan  Diagnosis postpartum  Asymptomatic  Systolic (39AHM), JAF:307 , Min:130 , DFR:520   Diastolic (52DAG), HIK:29, Min:71, Max:82  CBC/CMP wnl  P/Cr: 0.15  BP monitoring program    ASCUS with positive high risk HPV cervical  Assessment & Plan  ASCUS, HR-HPV other positive  Needs colpo postpartum, plan to schedule appt today prior to pt discharge    Late prenatal care affecting pregnancy in third trimester  Assessment & Plan  Limited prenatal care secondary to transportation limitations  CM gave patient resources    Mild persistent asthma without complication  Assessment & Plan  Albuterol prn    History of marijuana use  Assessment & Plan  Last use yesterday  UDS positive for [facility]  Patient needs CYS clearance prior to discharge    Anemia  Assessment & Plan  Lab Results   Component Value Date    HGB 8.9 (L) 10/05/2023     10.6 on admit, previously 8 in 2023  Hx of chronic anemia, normal electrophoresis in 2018  Hx of blood transfusion for symptomatic anemia in past  Currently asymptomatic    Anticipate discharge 10/6 vs 10/7. Subjective/Objective   Chief Complaint:   Post delivery. Subjective:   Pain: controlled.  Tolerating PO: yes. Voiding: yes. Flatus: yes. Bowel Movement: no. Ambulating: yes. Chest pain: no. Shortness of breath: no. Leg pain: no. Lochia: within normal limits. Infant feeding: bottle. Denies headache, vision changes, lower extremity pain or swelling. Objective:   Vitals:   Temp:  [97.8 °F (36.6 °C)-98.8 °F (37.1 °C)] 98.3 °F (36.8 °C)  HR:  [61-73] 70  Resp:  [16-18] 18  BP: (130-152)/(71-82) 152/81     Intake/Output Summary (Last 24 hours) at 10/6/2023 0612  Last data filed at 10/5/2023 2000  Gross per 24 hour   Intake 1000 ml   Output 1800 ml   Net -800 ml       Physical Exam:   -General: alert and oriented x 3, in no apparent distress  -Cardiovascular: regular rate  and rhythm  -Pulmonary: normal effort, clear to auscultation bilaterally  -Abdomen/Pelvis: soft, non-tender, non-distended, no rebound or guarding. Uterine fundus firm and non-tender, at the umbilicus.  Incision: CDI, steri strips in place  -Extremities: Nontender    Lab Results   Component Value Date    WBC 7.82 10/05/2023    HGB 8.9 (L) 10/05/2023    HCT 28.9 (L) 10/05/2023    MCV 83 10/05/2023     10/05/2023         Shayy Andrade MD  PGY-I, OBGYN  10/6/2023, 6:12 AM

## 2023-10-06 NOTE — CASE MANAGEMENT
Case Management Progress Note    Patient name Montse Turner  Location L&D 310/L&D 143-97 MRN [id number]  : 1995 Date 10/6/2023       LOS (days): 2  Geometric Mean LOS (GMLOS) (days):   Days to GMLOS:        OBJECTIVE:        Current admission status: Inpatient  Preferred Pharmacy:   [address] [place] [address] [address], [place] - [address] [address]  Phone: [phone] Fax: [phone]    [address] [place] [address] [address], [address] [place] [address]  Phone: [phone] Fax: [phone]    Primary Care Provider: Cinthya Seymour PA-C    Primary Insurance: [address] [place] [address]  Secondary Insurance:     PROGRESS NOTE:    CM spoke with Forrest Shah from [address] [place] [address] ([phone]). Forrest Shah stated she intends to complete home visit with [facility] post discharge. Forrest Shah is okay with baby d/c to [facility] once medically cleared.

## 2023-10-06 NOTE — PLAN OF CARE
Problem: PAIN - ADULT  Goal: Verbalizes/displays adequate comfort level or baseline comfort level  Description: Interventions:  - Encourage patient to monitor pain and request assistance  - Assess pain using appropriate pain scale  - Administer analgesics based on type and severity of pain and evaluate response  - Implement non-pharmacological measures as appropriate and evaluate response  - Consider cultural and social influences on pain and pain management  - Notify physician/advanced practitioner if interventions unsuccessful or patient reports new pain  Outcome: Progressing     Problem: INFECTION - ADULT  Goal: Absence or prevention of progression during hospitalization  Description: INTERVENTIONS:  - Assess and monitor for signs and symptoms of infection  - Monitor lab/diagnostic results  - Monitor all insertion sites, i.e. indwelling lines, tubes, and drains  - Monitor endotracheal if appropriate and nasal secretions for changes in amount and color  - [place] appropriate cooling/warming therapies per order  - Administer medications as ordered  - Instruct and encourage patient and family to use good hand hygiene technique  - Identify and instruct in appropriate isolation precautions for identified infection/condition  Outcome: Progressing  Goal: Absence of fever/infection during neutropenic period  Description: INTERVENTIONS:  - Monitor WBC    Outcome: Progressing     Problem: SAFETY ADULT  Goal: Patient will remain free of falls  Description: INTERVENTIONS:  - Educate patient/family on patient safety including physical limitations  - Instruct patient to call for assistance with activity   - Consult OT/PT to assist with strengthening/mobility   - Keep Call bell within reach  - Keep bed low and locked with side rails adjusted as appropriate  - Keep care items and personal belongings within reach  - Initiate and maintain comfort rounds  - Make Fall Risk Sign visible to staff  - Apply yellow socks and bracelet for high fall risk patients  - Consider moving patient to room near nurses station  Outcome: Progressing  Goal: Maintain or return to baseline ADL function  Description: INTERVENTIONS:  -  Assess patient's ability to carry out ADLs; assess patient's baseline for ADL function and identify physical deficits which impact ability to perform ADLs (bathing, care of mouth/teeth, toileting, grooming, dressing, etc.)  - Assess/evaluate cause of self-care deficits   - Assess range of motion  - Assess patient's mobility; develop plan if impaired  - Assess patient's need for assistive devices and provide as appropriate  - Encourage maximum independence but intervene and supervise when necessary  - Involve family in performance of ADLs  - Assess for home care needs following discharge   - Consider OT consult to assist with ADL evaluation and planning for discharge  - Provide patient education as appropriate  Outcome: Progressing  Goal: Maintains/Returns to pre admission functional level  Description: INTERVENTIONS:  - Perform BMAT or MOVE assessment daily.   - Set and communicate daily mobility goal to care team and patient/family/caregiver. - Collaborate with rehabilitation services on mobility goals if consulted  - Out of bed for toileting  - Record patient progress and toleration of activity level   Outcome: Progressing     Problem: Knowledge Deficit  Goal: Patient/family/caregiver demonstrates understanding of disease process, treatment plan, medications, and discharge instructions  Description: Complete learning assessment and assess knowledge base.   Interventions:  - Provide teaching at level of understanding  - Provide teaching via preferred learning methods  Outcome: Progressing     Problem: DISCHARGE PLANNING  Goal: Discharge to home or other facility with appropriate resources  Description: INTERVENTIONS:  - Identify barriers to discharge w/patient and caregiver  - Arrange for needed discharge resources and transportation as appropriate  - Identify discharge learning needs (meds, wound care, etc.)  - Arrange for interpretive services to assist at discharge as needed  - Refer to Case Management Department for coordinating discharge planning if the patient needs post-hospital services based on physician/advanced practitioner order or complex needs related to functional status, cognitive ability, or social support system  Outcome: Progressing

## 2023-10-06 NOTE — PLAN OF CARE
Problem: PAIN - ADULT  Goal: Verbalizes/displays adequate comfort level or baseline comfort level  Description: Interventions:  - Encourage patient to monitor pain and request assistance  - Assess pain using appropriate pain scale  - Administer analgesics based on type and severity of pain and evaluate response  - Implement non-pharmacological measures as appropriate and evaluate response  - Consider cultural and social influences on pain and pain management  - Notify physician/advanced practitioner if interventions unsuccessful or patient reports new pain  Outcome: Progressing     Problem: INFECTION - ADULT  Goal: Absence or prevention of progression during hospitalization  Description: INTERVENTIONS:  - Assess and monitor for signs and symptoms of infection  - Monitor lab/diagnostic results  - Monitor all insertion sites, i.e. indwelling lines, tubes, and drains  - Monitor endotracheal if appropriate and nasal secretions for changes in amount and color  - [place] appropriate cooling/warming therapies per order  - Administer medications as ordered  - Instruct and encourage patient and family to use good hand hygiene technique  - Identify and instruct in appropriate isolation precautions for identified infection/condition  Outcome: Progressing  Goal: Absence of fever/infection during neutropenic period  Description: INTERVENTIONS:  - Monitor WBC    Outcome: Progressing     Problem: SAFETY ADULT  Goal: Patient will remain free of falls  Description: INTERVENTIONS:  - Educate patient/family on patient safety including physical limitations  - Instruct patient to call for assistance with activity   - Consult OT/PT to assist with strengthening/mobility   - Keep Call bell within reach  - Keep bed low and locked with side rails adjusted as appropriate  - Keep care items and personal belongings within reach  - Initiate and maintain comfort rounds  - Make Fall Risk Sign visible to staff  - Apply yellow socks and bracelet for high fall risk patients  - Consider moving patient to room near nurses station  Outcome: Progressing  Goal: Maintain or return to baseline ADL function  Description: INTERVENTIONS:  -  Assess patient's ability to carry out ADLs; assess patient's baseline for ADL function and identify physical deficits which impact ability to perform ADLs (bathing, care of mouth/teeth, toileting, grooming, dressing, etc.)  - Assess/evaluate cause of self-care deficits   - Assess range of motion  - Assess patient's mobility; develop plan if impaired  - Assess patient's need for assistive devices and provide as appropriate  - Encourage maximum independence but intervene and supervise when necessary  - Involve family in performance of ADLs  - Assess for home care needs following discharge   - Consider OT consult to assist with ADL evaluation and planning for discharge  - Provide patient education as appropriate  Outcome: Progressing  Goal: Maintains/Returns to pre admission functional level  Description: INTERVENTIONS:  - Perform BMAT or MOVE assessment daily.   - Set and communicate daily mobility goal to care team and patient/family/caregiver. - Collaborate with rehabilitation services on mobility goals if consulted  - Out of bed for toileting  - Record patient progress and toleration of activity level   Outcome: Progressing     Problem: Knowledge Deficit  Goal: Patient/family/caregiver demonstrates understanding of disease process, treatment plan, medications, and discharge instructions  Description: Complete learning assessment and assess knowledge base.   Interventions:  - Provide teaching at level of understanding  - Provide teaching via preferred learning methods  Outcome: Progressing     Problem: DISCHARGE PLANNING  Goal: Discharge to home or other facility with appropriate resources  Description: INTERVENTIONS:  - Identify barriers to discharge w/patient and caregiver  - Arrange for needed discharge resources and transportation as appropriate  - Identify discharge learning needs (meds, wound care, etc.)  - Arrange for interpretive services to assist at discharge as needed  - Refer to Case Management Department for coordinating discharge planning if the patient needs post-hospital services based on physician/advanced practitioner order or complex needs related to functional status, cognitive ability, or social support system  Outcome: Progressing

## 2023-10-06 NOTE — PLAN OF CARE
Problem: PAIN - ADULT  Goal: Verbalizes/displays adequate comfort level or baseline comfort level  Description: Interventions:  - Encourage patient to monitor pain and request assistance  - Assess pain using appropriate pain scale  - Administer analgesics based on type and severity of pain and evaluate response  - Implement non-pharmacological measures as appropriate and evaluate response  - Consider cultural and social influences on pain and pain management  - Notify physician/advanced practitioner if interventions unsuccessful or patient reports new pain  Outcome: Completed     Problem: INFECTION - ADULT  Goal: Absence or prevention of progression during hospitalization  Description: INTERVENTIONS:  - Assess and monitor for signs and symptoms of infection  - Monitor lab/diagnostic results  - Monitor all insertion sites, i.e. indwelling lines, tubes, and drains  - Monitor endotracheal if appropriate and nasal secretions for changes in amount and color  - [place] appropriate cooling/warming therapies per order  - Administer medications as ordered  - Instruct and encourage patient and family to use good hand hygiene technique  - Identify and instruct in appropriate isolation precautions for identified infection/condition  Outcome: Completed  Goal: Absence of fever/infection during neutropenic period  Description: INTERVENTIONS:  - Monitor WBC    Outcome: Completed     Problem: SAFETY ADULT  Goal: Patient will remain free of falls  Description: INTERVENTIONS:  - Educate patient/family on patient safety including physical limitations  - Instruct patient to call for assistance with activity   - Consult OT/PT to assist with strengthening/mobility   - Keep Call bell within reach  - Keep bed low and locked with side rails adjusted as appropriate  - Keep care items and personal belongings within reach  - Initiate and maintain comfort rounds  - Make Fall Risk Sign visible to staff  - Apply yellow socks and bracelet for high fall risk patients  - Consider moving patient to room near nurses station  Outcome: Completed  Goal: Maintain or return to baseline ADL function  Description: INTERVENTIONS:  -  Assess patient's ability to carry out ADLs; assess patient's baseline for ADL function and identify physical deficits which impact ability to perform ADLs (bathing, care of mouth/teeth, toileting, grooming, dressing, etc.)  - Assess/evaluate cause of self-care deficits   - Assess range of motion  - Assess patient's mobility; develop plan if impaired  - Assess patient's need for assistive devices and provide as appropriate  - Encourage maximum independence but intervene and supervise when necessary  - Involve family in performance of ADLs  - Assess for home care needs following discharge   - Consider OT consult to assist with ADL evaluation and planning for discharge  - Provide patient education as appropriate  Outcome: Completed  Goal: Maintains/Returns to pre admission functional level  Description: INTERVENTIONS:  - Perform BMAT or MOVE assessment daily.   - Set and communicate daily mobility goal to care team and patient/family/caregiver. - Collaborate with rehabilitation services on mobility goals if consulted  - Out of bed for toileting  - Record patient progress and toleration of activity level   Outcome: Completed     Problem: Knowledge Deficit  Goal: Patient/family/caregiver demonstrates understanding of disease process, treatment plan, medications, and discharge instructions  Description: Complete learning assessment and assess knowledge base.   Interventions:  - Provide teaching at level of understanding  - Provide teaching via preferred learning methods  Outcome: Completed     Problem: DISCHARGE PLANNING  Goal: Discharge to home or other facility with appropriate resources  Description: INTERVENTIONS:  - Identify barriers to discharge w/patient and caregiver  - Arrange for needed discharge resources and transportation as appropriate  - Identify discharge learning needs (meds, wound care, etc.)  - Arrange for interpretive services to assist at discharge as needed  - Refer to Case Management Department for coordinating discharge planning if the patient needs post-hospital services based on physician/advanced practitioner order or complex needs related to functional status, cognitive ability, or social support system  Outcome: Completed

## 2023-10-08 ENCOUNTER — TELEPHONE (OUTPATIENT)
Dept: OTHER | Facility: HOSPITAL | Age: 28
End: 2023-10-08

## 2023-10-08 LAB — BACTERIA UR CULT: NORMAL

## 2023-10-08 NOTE — TELEPHONE ENCOUNTER
Remote BP Monitoring. Patient post partum day # 4. Patient reported BP of (160/90). Patient instructed to recheck BP. Repeat BP (149/93).  Provider notified

## 2023-10-09 ENCOUNTER — TELEPHONE (OUTPATIENT)
Dept: OBGYN CLINIC | Facility: CLINIC | Age: 28
End: 2023-10-09

## 2023-10-09 NOTE — TELEPHONE ENCOUNTER
----- Message from Billy Roberson. Zahraa Hernandez MD sent at 10/6/2023  8:58 AM EDT -----  Regarding: Patient needs follow up in 915  St is a patient who delivered by CS on 10/04/2023 who was a Clinic patient but lives in [facility] and wants to establish care with us.        Please schedule 3 appointments-  1-2 week incision check  6 week postpartum  8-12 weeks colposcopy

## 2023-10-09 NOTE — UTILIZATION REVIEW
NOTIFICATION OF INPATIENT ADMISSION   MATERNITY/DELIVERY AUTHORIZATION REQUEST   SERVICING FACILITY:   [address] [place] [address] - L&D, [place], NICU  [address]  Tax ID: [id number]  NPI: [id number] ATTENDING PROVIDER:  Attending Name and NPI#: Jennifer Gutierrez Md [[id number]]  Address: [address]  Phone: [phone]     ADMISSION INFORMATION:  Place of Service: Inpatient [address] [facility]  Place of Service Code: 21  Inpatient Admission Date/Time: 10/4/23 12:24 PM  Discharge Date/Time: 10/6/2023  4:30 PM  Admitting Diagnosis Code/Description:  Encounter for  delivery without indication [O82]   Mother: Luis Salas 1993 Estimated Date of Delivery: 10/10/23  Delivering clinician: Jennifer Gutierrez    OB History        5    Para   5    Term   5            AB        Living   5       SAB        IAB        Ectopic        Multiple   0    Live Births   5                Name & MRN:   Information for the patient's :  Betito Shaffer [facility]) [[id number]]      Delivery Information:  Sex: female  Delivered 10/4/2023 3:52 PM by , Low Transverse; Gestational Age: 37w4d     Measurements:  Weight: 6 lb 11.2 oz (3040 g); Height: 19.5"    APGAR 1 minute 5 minutes 10 minutes   Totals: 8 9       Birth Information: 25 y.o. female MRN: [id number] Unit/Bed#: L&D 310-01   Birthweight: No birth weight on file. Gestational Age: <None> Delivery Type:    APGARS Totals:        UTILIZATION REVIEW CONTACT:  Eliazar Bautista Utilization   Network Utilization Review Department  Phone: [phone]  Fax [phone]  Email: [email or web address]  Contact for approvals/pending authorizations, clinical reviews, and discharge.    PHYSICIAN ADVISORY SERVICES:  Medical Necessity Denial & [place] Review  Phone: [phone]  Fax: [phone]  Email: [email or web address]   DISCHARGE SUPPORT TEAM:  For Patients Discharge Needs & Updates  Phone: [phone] opt. 2 Fax: [phone]  Email: [email or web address]

## 2023-10-09 NOTE — TELEPHONE ENCOUNTER
Left voice message for Thania to call the office back to schedule in our [facility] office.

## 2023-10-11 ENCOUNTER — OFFICE VISIT (OUTPATIENT)
Dept: OBGYN CLINIC | Facility: CLINIC | Age: 28
End: 2023-10-11

## 2023-10-11 VITALS
HEIGHT: 62 IN | WEIGHT: 153 LBS | BODY MASS INDEX: 28.16 KG/M2 | SYSTOLIC BLOOD PRESSURE: 122 MMHG | DIASTOLIC BLOOD PRESSURE: 74 MMHG

## 2023-10-11 DIAGNOSIS — Z98.891 STATUS POST REPEAT LOW TRANSVERSE CESAREAN SECTION: Primary | ICD-10-CM

## 2023-10-11 PROCEDURE — 99024 POSTOP FOLLOW-UP VISIT: CPT | Performed by: OBSTETRICS & GYNECOLOGY

## 2023-10-11 NOTE — PROGRESS NOTES
Subjective     Thania Jaimes is a 25 y.o. [id number] female who presents to the office 2 weeks status post repeat   for term pregnancy . Eating a regular diet without difficulty. Bowel movements are normal. The patient is not having any pain. The following portions of the patient's history were reviewed and updated as appropriate: allergies, current medications, past family history, past medical history, past social history, past surgical history, and problem list.    Review of Systems  Pertinent items are noted in HPI. Objective  /74   Ht 5' 2" (1.575 m)   Wt 69.4 kg (153 lb)   LMP 2023 (Exact Date)   Breastfeeding No   BMI 27.98 kg/m²     General:  alert and oriented, in no acute distress   Abdomen: soft, bowel sounds active, non-tender   Incision:   healing well, no drainage, no erythema, no hernia, no seroma, no swelling, no dehiscence, incision well approximated         Assessment      Doing well postoperatively. Plan     1. Continue any current medications. 2. Wound care discussed. 3. Activity restrictions: no gym class and no sports  4.  Follow up: 4 weeks for postpartum exam

## 2023-10-12 ENCOUNTER — TELEPHONE (OUTPATIENT)
Dept: OBGYN CLINIC | Facility: MEDICAL CENTER | Age: 28
End: 2023-10-12

## 2023-10-12 VITALS — DIASTOLIC BLOOD PRESSURE: 94 MMHG | SYSTOLIC BLOOD PRESSURE: 159 MMHG | HEART RATE: 59 BPM

## 2023-10-12 LAB — PLACENTA IN STORAGE: NORMAL

## 2023-10-12 NOTE — TELEPHONE ENCOUNTER
Left message on vm to call back. Please schedule patient for incision check within 1 week. Thank you.     ----- Message from Janell Holder MD sent at 10/12/2023  7:25 AM EDT -----  Regarding: RE: needs follow  up  Please schedule follow up within 1 week as requested by [facility].  Thanks!    ----- Message -----  From: Emi Capps MA  Sent: 10/11/2023   4:29 PM EDT  To: Janell Holder MD  Subject: FW: needs follow  up                               ----- Message -----  From: Farheen Peace MD  Sent: 10/8/2023   6:16 PM EDT  To: [facility] [place] [facility]  Subject: needs follow  up                                 On BP monitoring program   Please make sure she has follow up this week

## 2023-10-17 NOTE — DISCHARGE SUMMARY
Discharge Summary - Thania Salazar Head 25 y.o. female MRN: [id number]    Unit/Bed#: L&D 310-01 Encounter: [id number]    Admission Date:   Admission Orders (From admission, onward)       Ordered        10/04/23 1224  Inpatient Admission  Once                            Admitting Diagnosis: Encounter for  delivery without indication [O82]    HPI: Nicole Layton is a 25 y.o. [place] [facility] admitted at 39w1d on 10/04/2023 for scheduled 5th repeat  delivery. Procedures Performed: No orders of the defined types were placed in this encounter. Summary of Hospital Course: Pregnancy was complicated by history of postpartum hemorrhage requiring blood transfusion, insufficient prenatal care, and history of gestational hypertension. She was offered and declined tubal sterilization or long acting reversible contraception. Repeat  was uncomplicated. She delivered a viable female weighing 6lbs 11.2oz;  Apgar scores of 8 at one minute and 9 at five minutes. Postoperatively she had anemia and received IV venofer. Her hemoglobin was 8.9 on the date of discharge. Significant Findings, Care, Treatment and Services Provided: None    Complications: None    Discharge Diagnosis: s/p repeat     Medical Problems       Resolved Problems  Date Reviewed: 10/19/2022   None         Condition at Discharge: good         Discharge instructions/Information to patient and family:   See after visit summary for information provided to patient and family. Provisions for Follow-Up Care:  See after visit summary for information related to follow-up care and any pertinent home health orders. PCP: Narinder May PA-C    Disposition: Home    Planned Readmission: No      Discharge Statement   I spent 15 minutes discharging the patient. This time was spent on the day of discharge. I had direct contact with the patient on the day of discharge.  Additional documentation is required if more than 30 minutes were spent on discharge. Discharge Medications:  See after visit summary for reconciled discharge medications provided to patient and family.

## 2023-10-19 VITALS — SYSTOLIC BLOOD PRESSURE: 125 MMHG | HEART RATE: 69 BPM | DIASTOLIC BLOOD PRESSURE: 86 MMHG

## 2023-10-28 ENCOUNTER — TELEPHONE (OUTPATIENT)
Dept: OTHER | Facility: HOSPITAL | Age: 28
End: 2023-10-28

## 2023-10-28 NOTE — TELEPHONE ENCOUNTER
Post partum day 24 with noncompliance r/t OB monitoring program.  She's been contacted many times by the [facility] and hasn't been responsive. We have not received VS data since 10/25/23.   Per OB Admitting On Call, Pt removed from the OB monitoring program.

## 2023-11-22 ENCOUNTER — POSTPARTUM VISIT (OUTPATIENT)
Dept: OBGYN CLINIC | Facility: CLINIC | Age: 28
End: 2023-11-22
Payer: COMMERCIAL

## 2023-11-22 VITALS
WEIGHT: 172 LBS | SYSTOLIC BLOOD PRESSURE: 130 MMHG | HEIGHT: 62 IN | DIASTOLIC BLOOD PRESSURE: 88 MMHG | BODY MASS INDEX: 31.65 KG/M2

## 2023-11-22 DIAGNOSIS — Z98.891 STATUS POST REPEAT LOW TRANSVERSE CESAREAN SECTION: ICD-10-CM

## 2023-11-22 DIAGNOSIS — R87.810 ASCUS WITH POSITIVE HIGH RISK HPV CERVICAL: ICD-10-CM

## 2023-11-22 DIAGNOSIS — Z30.011 ENCOUNTER FOR INITIAL PRESCRIPTION OF CONTRACEPTIVE PILLS: ICD-10-CM

## 2023-11-22 DIAGNOSIS — R87.610 ASCUS WITH POSITIVE HIGH RISK HPV CERVICAL: ICD-10-CM

## 2023-11-22 RX ORDER — DROSPIRENONE AND ETHINYL ESTRADIOL 0.02-3(28)
1 KIT ORAL DAILY
Qty: 84 TABLET | Refills: 1 | Status: SHIPPED | OUTPATIENT
Start: 2023-11-22

## 2023-11-22 NOTE — PROGRESS NOTES
Assessment:  25 y.o. [id number] who isPOD#6wk from [facility]. Plan:  Diagnoses and all orders for this visit:    Postpartum exam  Status post repeat low transverse  section  - Routine postop care  - Discussed symptoms and exam findings. No abnormality noted; suspect neuropathic discomfort and discussed natural hx of same. Discussed gabapentin, declines  - Anticipatory guidance given  - Return for yearly    Encounter for initial prescription of contraceptive pills  -     drospirenone-ethinyl estradiol (WERO) 3-0.02 MG per tablet; Take 1 tablet by mouth daily  - Return in 3-4mo    ASCUS with positive high risk HPV cervical    - Yearly with colpo in 3-4mo  __________________________________________________________________    Subjective   Thania Mosqueda is a 25 y.o. [id number] who presents POD#6wk from [facility] (#5) of a 6lb 11oz baby girl. Pregnancy complicated by insufficient PN care and anemia. Patient reports only bothered by abd pain presently. Superficial, left sided, and burning in nature. Worse with bending and rubbing of clothing. No GI changes. No rashes. Incision largely healing well. Denser spot at right margin, but denies redness or drainage. Her bowel function is normal and she is having regular BM's. She has returned to most of her normal activities. Lochia is resolved and was very light throughout. She reports that she is doing well emotionally and denies depressive sx. She is bottlefeeding. Would like to start birth control pill. Has never used same prior. Objective  /88   Ht 5' 2" (1.575 m)   Wt 78 kg (172 lb)   LMP 2023 (Exact Date)   Breastfeeding No   BMI 31.46 kg/m²      Physical Exam:  Physical Exam  Constitutional:       General: She is not in acute distress. Appearance: Normal appearance. She is not ill-appearing, toxic-appearing or diaphoretic. Eyes:      General: No scleral icterus. Right eye: No discharge. Left eye: No discharge. Conjunctiva/sclera: Conjunctivae normal.   Cardiovascular:      Rate and Rhythm: Normal rate. Pulmonary:      Effort: Pulmonary effort is normal. No respiratory distress. Abdominal:      General: There is no distension. Palpations: There is no mass. Tenderness: There is no abdominal tenderness. There is no guarding or rebound. Hernia: No hernia is present. Comments: Incision clean, dry, and intact. No erythema or drainage. Mild retraction of right angle. No significant diastasis or evidence of hernia    Musculoskeletal:         General: No swelling. Skin:     General: Skin is warm and dry. Coloration: Skin is not jaundiced or pale. Findings: No bruising or erythema. Neurological:      Mental Status: She is alert. Psychiatric:         Mood and Affect: Mood normal.         Behavior: Behavior normal.         Thought Content:  Thought content normal.         Judgment: Judgment normal.

## 2023-12-11 ENCOUNTER — PROCEDURE VISIT (OUTPATIENT)
Dept: OBGYN CLINIC | Facility: CLINIC | Age: 28
End: 2023-12-11
Payer: COMMERCIAL

## 2023-12-11 VITALS
HEIGHT: 62 IN | WEIGHT: 173 LBS | SYSTOLIC BLOOD PRESSURE: 158 MMHG | BODY MASS INDEX: 31.83 KG/M2 | DIASTOLIC BLOOD PRESSURE: 102 MMHG

## 2023-12-11 DIAGNOSIS — R87.610 ASCUS WITH POSITIVE HIGH RISK HPV CERVICAL: Primary | ICD-10-CM

## 2023-12-11 DIAGNOSIS — R87.810 ASCUS WITH POSITIVE HIGH RISK HPV CERVICAL: Primary | ICD-10-CM

## 2023-12-11 LAB — SL AMB POCT URINE HCG: NEGATIVE

## 2023-12-11 PROCEDURE — 88305 TISSUE EXAM BY PATHOLOGIST: CPT | Performed by: PATHOLOGY

## 2023-12-11 PROCEDURE — 81025 URINE PREGNANCY TEST: CPT | Performed by: STUDENT IN AN ORGANIZED HEALTH CARE EDUCATION/TRAINING PROGRAM

## 2023-12-11 PROCEDURE — 57454 BX/CURETT OF CERVIX W/SCOPE: CPT | Performed by: STUDENT IN AN ORGANIZED HEALTH CARE EDUCATION/TRAINING PROGRAM

## 2023-12-11 NOTE — PROGRESS NOTES
[facility] [address]       Colposcopy     Date/Time  12/11/2023 1:40 PM     Universal Protocol   Consent: Written consent obtained. Risks and benefits: risks, benefits and alternatives were discussed  Consent given by: patient  Time out: Immediately prior to procedure a "time out" was called to verify the correct patient, procedure, equipment, support staff and site/side marked as required. Timeout called at: 12/11/2023 1:40 PM.  Patient understanding: patient states understanding of the procedure being performed  Patient consent: the patient's understanding of the procedure matches consent given  Procedure consent: procedure consent matches procedure scheduled  Relevant documents: relevant documents present and verified  Test results: test results available and properly labeled  Site marked: the operative site was marked  Required items: required blood products, implants, devices, and special equipment available  Patient identity confirmed: verbally with patient     Performed by  Brian Woodall. Kit Campo MD   Authorized by  Brian Campo MD     Pre-procedure details      Pre-procedure timeout performed: yes     Indication    ASC-US   Procedure Details   Procedure: Colposcopy w/ cervical biopsy and ECC      Under satisfactory analgesia the patient was prepped and draped in the dorsal lithotomy position: yes      [place] speculum was placed in the vagina: yes      Under colposcopic examination the transition zone was seen in entirety: yes      Endocervix was curetted using a Kevorkian curette: yes      Cervical biopsy performed with a cervical biopsy punch: yes      Biopsy(s): yes      Location:  12 o'clock, ECC    Specimen to pathology: yes     Post-procedure      Findings comment:  Normal    Impression comment:  Normal    Patient tolerance of procedure:   Tolerated well, no immediate complications       Babita Ruiz MD  [facility]  [facility] [address] [facility]  12/11/2023 2:18 PM

## 2023-12-15 PROCEDURE — 88305 TISSUE EXAM BY PATHOLOGIST: CPT | Performed by: PATHOLOGY

## 2024-01-08 ENCOUNTER — OFFICE VISIT (OUTPATIENT)
Dept: DENTISTRY | Facility: CLINIC | Age: 29
End: 2024-01-08
Payer: COMMERCIAL

## 2024-01-08 VITALS — HEART RATE: 80 BPM | SYSTOLIC BLOOD PRESSURE: 126 MMHG | DIASTOLIC BLOOD PRESSURE: 82 MMHG

## 2024-01-08 DIAGNOSIS — K03.6 ACCRETIONS ON TEETH: Primary | ICD-10-CM

## 2024-01-08 PROCEDURE — D0190 SCREENING OF A PATIENT: HCPCS | Performed by: DENTAL HYGIENIST

## 2024-01-08 PROCEDURE — D1330 ORAL HYGIENE INSTRUCTIONS: HCPCS | Performed by: DENTAL HYGIENIST

## 2024-01-08 PROCEDURE — D0210 INTRAORAL - COMPLETE SERIES OF RADIOGRAPHIC IMAGES: HCPCS | Performed by: DENTAL HYGIENIST

## 2024-01-08 NOTE — PROGRESS NOTES
Chief Complaint   Patient presents with    Routine Oral Cleaning     Patient has not been to the dentist since she was a child. She has pain on UL for 3 years, it hurts when food gets into the tooth. Hurts to cold.    #15 hurts to Palpation negative for percussion. Patient is not interested in saving the tooth with RCT for financial reasons.  Prophy not done today- new patient and she needs more time for cleaning.     Radiographs Taken in Dexis: (Taken to assess periodontal health)  Complete mouth series  IO Photos taken     Intra/Extra Oral Cancer Screening:  Within normal limits    Oral Hygiene:  Poor    Plaque:  Moderate    Calculus:  Moderate  Heavy    Bleeding:  Light  Moderate  During probes    Stain:  Light    Periodontal Charting: Showed patient sub calc specks and bone height  Full probing    Periodontal Classification:  Generalized  Mild  Periodontal Disease      Oral Hygiene Instruction:    Went over daily routine and c-shaped flossing. Demonstrated in the mirror.   Recommended a daily rinse.   Discussed Oral systemic link and role of plaque in gum disease.    No orders of the defined types were placed in this encounter.       Next Visit:  Prophy and schedule a Dentist visit- comp    Dr. Iglesias to review #15. I will contact patient with recommendations    Spoke to pt. Dr. Iglesias review radiographs and IO photos. She recommends EXT to Endo. I explained both procedures and patient made an informed decision to have the tooth EXT. Radiographs, referral, and list of OS placed at the  for the pt to .

## 2024-01-09 ENCOUNTER — TELEPHONE (OUTPATIENT)
Dept: DENTISTRY | Facility: CLINIC | Age: 29
End: 2024-01-09

## 2024-01-09 NOTE — TELEPHONE ENCOUNTER
Spoke to pt. Dr. Iglesias review radiographs and IO photos. She recommends EXT to Endo. I explained both procedures and patient made an informed decision to have the tooth EXT. Radiographs, referral, and list of OS placed at the  for the pt to .

## 2025-04-29 ENCOUNTER — TELEPHONE (OUTPATIENT)
Dept: FAMILY MEDICINE CLINIC | Facility: CLINIC | Age: 30
End: 2025-04-29

## 2025-05-06 NOTE — TELEPHONE ENCOUNTER
Please remove patient from Nancy Hutton's panel, pt transferring to [facility], CLOVIS Lucas.  Thank you

## (undated) DEVICE — GLOVE SRG BIOGEL 7.5

## (undated) DEVICE — GLOVE INDICATOR PI UNDERGLOVE SZ 7.5 BLUE

## (undated) DEVICE — SUT MONOCRYL 4-0 PS-2 27 IN Y426H

## (undated) DEVICE — CHLORAPREP HI-LITE 26ML ORANGE

## (undated) DEVICE — SUT CHROMIC 0 CT-1 27 IN 812H

## (undated) DEVICE — SUT VICRYL 0 CT-1 36 IN J946H

## (undated) DEVICE — SUT PLAIN 2-0 CTX 27 IN 872H

## (undated) DEVICE — GLOVE SRG BIOGEL ECLIPSE 7

## (undated) DEVICE — SUT CHROMIC 2-0 CT-1 27 IN 811H

## (undated) DEVICE — SWABSTCK, BENZOIN TINCTURE, 1/PK, STRL: Brand: APLICARE

## (undated) DEVICE — ABG MICROSTICKS SAFETY

## (undated) DEVICE — ADHESIVE SKN CLSR HISTOACRYL FLEX 0.5ML LF

## (undated) DEVICE — 3M™ STERI-STRIP™ REINFORCED ADHESIVE SKIN CLOSURES, R1547, 1/2 IN X 4 IN (12 MM X 100 MM), 6 STRIPS/ENVELOPE: Brand: 3M™ STERI-STRIP™

## (undated) DEVICE — SUT VICRYL 0 CTX 36 IN J978H

## (undated) DEVICE — PACK C-SECTION PBDS